# Patient Record
Sex: FEMALE | Race: WHITE | Employment: FULL TIME | URBAN - METROPOLITAN AREA
[De-identification: names, ages, dates, MRNs, and addresses within clinical notes are randomized per-mention and may not be internally consistent; named-entity substitution may affect disease eponyms.]

---

## 2017-03-15 ENCOUNTER — HOSPITAL ENCOUNTER (OUTPATIENT)
Dept: ULTRASOUND IMAGING | Age: 52
Discharge: HOME | End: 2017-03-15
Attending: OBSTETRICS & GYNECOLOGY | Admitting: OBSTETRICS & GYNECOLOGY

## 2017-03-15 DIAGNOSIS — R92.8 OTHER (ABNORMAL) FINDINGS ON RADIOLOGICAL EXAMINATION OF BREAST: ICD-10-CM

## 2018-09-27 ENCOUNTER — HOSPITAL ENCOUNTER (OUTPATIENT)
Dept: WOMENS IMAGING | Age: 53
Discharge: HOME | End: 2018-09-27
Payer: COMMERCIAL

## 2018-09-27 DIAGNOSIS — Z12.31 ENCOUNTER FOR SCREENING MAMMOGRAM FOR BREAST CANCER: ICD-10-CM

## 2018-09-27 PROCEDURE — 77063 BREAST TOMOSYNTHESIS BI: CPT

## 2019-10-23 ENCOUNTER — HOSPITAL ENCOUNTER (OUTPATIENT)
Dept: WOMENS IMAGING | Age: 54
Discharge: HOME | End: 2019-10-23
Payer: COMMERCIAL

## 2019-10-23 DIAGNOSIS — Z12.31 BREAST CANCER SCREENING BY MAMMOGRAM: ICD-10-CM

## 2019-10-23 PROCEDURE — 77063 BREAST TOMOSYNTHESIS BI: CPT

## 2020-12-18 ENCOUNTER — HOSPITAL ENCOUNTER (OUTPATIENT)
Dept: MAMMOGRAPHY | Age: 55
Discharge: HOME | End: 2020-12-18
Payer: COMMERCIAL

## 2020-12-18 PROCEDURE — 77067 SCR MAMMO BI INCL CAD: CPT

## 2021-03-30 LAB
ALBUMIN SERPL-MCNC: 4.3 G/DL (ref 3.4–5)
ALP BLD-CCNC: 59 U/L (ref 40–129)
ALT SERPL-CCNC: 21 U/L (ref 10–40)
AST SERPL-CCNC: 24 U/L (ref 15–37)
BILIRUB SERPL-MCNC: 0.6 MG/DL (ref 0–1)
BILIRUBIN DIRECT: <0.2 MG/DL (ref 0–0.3)
BILIRUBIN, INDIRECT: NORMAL MG/DL (ref 0–1)
TOTAL PROTEIN: 6.7 G/DL (ref 6.4–8.2)

## 2021-04-19 ENCOUNTER — OFFICE VISIT (OUTPATIENT)
Dept: ORTHOPEDIC SURGERY | Age: 56
End: 2021-04-19
Payer: COMMERCIAL

## 2021-04-19 VITALS — HEIGHT: 66 IN | BODY MASS INDEX: 25.71 KG/M2 | WEIGHT: 160 LBS

## 2021-04-19 DIAGNOSIS — M16.11 PRIMARY OSTEOARTHRITIS OF RIGHT HIP: ICD-10-CM

## 2021-04-19 DIAGNOSIS — M25.551 RIGHT HIP PAIN: Primary | ICD-10-CM

## 2021-04-19 PROCEDURE — 20611 DRAIN/INJ JOINT/BURSA W/US: CPT | Performed by: ORTHOPAEDIC SURGERY

## 2021-04-19 PROCEDURE — 99203 OFFICE O/P NEW LOW 30 MIN: CPT | Performed by: ORTHOPAEDIC SURGERY

## 2021-04-19 RX ORDER — LIDOCAINE HYDROCHLORIDE 10 MG/ML
1 INJECTION, SOLUTION INFILTRATION; PERINEURAL ONCE
Status: COMPLETED | OUTPATIENT
Start: 2021-04-19 | End: 2021-04-19

## 2021-04-19 RX ORDER — BETAMETHASONE SODIUM PHOSPHATE AND BETAMETHASONE ACETATE 3; 3 MG/ML; MG/ML
1 INJECTION, SUSPENSION INTRA-ARTICULAR; INTRALESIONAL; INTRAMUSCULAR; SOFT TISSUE ONCE
Status: COMPLETED | OUTPATIENT
Start: 2021-04-19 | End: 2021-04-19

## 2021-04-19 RX ADMIN — BETAMETHASONE SODIUM PHOSPHATE AND BETAMETHASONE ACETATE 1.02 MG: 3; 3 INJECTION, SUSPENSION INTRA-ARTICULAR; INTRALESIONAL; INTRAMUSCULAR; SOFT TISSUE at 17:10

## 2021-04-19 RX ADMIN — LIDOCAINE HYDROCHLORIDE 1 ML: 10 INJECTION, SOLUTION INFILTRATION; PERINEURAL at 17:11

## 2021-04-19 SDOH — ECONOMIC STABILITY: TRANSPORTATION INSECURITY
IN THE PAST 12 MONTHS, HAS LACK OF TRANSPORTATION KEPT YOU FROM MEETINGS, WORK, OR FROM GETTING THINGS NEEDED FOR DAILY LIVING?: NOT ASKED

## 2021-04-25 NOTE — PROGRESS NOTES
joint space narrowing compared to her left. There is also some changes at the superior lateral aspect of the acetabulum likely matching that impingement. No lytic or blastic lesions. Impression:  Encounter Diagnoses   Name Primary?  Right hip pain Yes    Primary osteoarthritis of right hip        Office Procedures:  Orders Placed This Encounter   Procedures    XR HIP 2-3 VW W PELVIS RIGHT     Standing Status:   Future     Number of Occurrences:   1     Standing Expiration Date:   5/17/2021    US GUIDED NEEDLE PLACEMENT     Standing Status:   Future     Number of Occurrences:   1     Standing Expiration Date:   4/19/2022     Order Specific Question:   Reason for exam:     Answer:   right hip injection       Treatment Plan: This point she is having persistent symptoms that are interfering with her daily activities. Her x-rays do show features of impingement and she may possibly have underlying labral pathology. We discussed treatment options and certainly one way to hopefully reduce her pain and improve her overall function will be to offer her an ultrasound-guided cortisone injection. She understands this will not cure any mechanical symptoms. If she does have significant improvement she may need to consider MRI assessment and possible arthroscopic intervention but that would also require considerable time off work. At this point she would like to go ahead and proceed with the injection first just to see how her symptoms improve. 1.  Injection with cortisone was recommended into  right   hip joint using ultrasound visualization. She understands the risks and benefits of the injection and describes no potential allergies. Time out was performed to verify correct person, correct procedure, and correct site. 2. Ultrasound visualization was first performed utilizing unit using an 5/2 MHz Curved Probe. finding the femoral neck head junction.  Next the femoral artery and vein were visualized with

## 2021-06-07 ENCOUNTER — TELEPHONE (OUTPATIENT)
Dept: ORTHOPEDIC SURGERY | Age: 56
End: 2021-06-07

## 2021-06-07 DIAGNOSIS — M16.11 PRIMARY OSTEOARTHRITIS OF RIGHT HIP: Primary | ICD-10-CM

## 2021-06-07 NOTE — TELEPHONE ENCOUNTER
I spoke to pt. MRI has been ordered for prior-auth. She has selected to go to [facility]. I let pt know we will call her once we get approval from her insurance company.

## 2021-06-10 ENCOUNTER — TELEPHONE (OUTPATIENT)
Dept: ORTHOPEDIC SURGERY | Age: 56
End: 2021-06-10

## 2021-06-10 NOTE — TELEPHONE ENCOUNTER
Curly Robb MA  [facility] [id number] [facility]  MRI RIGHT HIP approved Auth# [id number] - JLB     Faxed over information to [email or web address]. Called and LMOM for pt to call back, as  did not identify pt. If pt calls back, please inform her that she should be able to call [facility] [place] to schedule her MRI. After scheduling the MRI, she will need to schedule a f/u visit with OG.

## 2021-06-11 NOTE — TELEPHONE ENCOUNTER
Called and [facility] for pt to call back. If pt returns call, please inform her that she can call [facility] to schedule her MRI.

## 2021-06-14 NOTE — TELEPHONE ENCOUNTER
Called and LMOM on home number to inform pt of MRI auth and to schedule a f/u visit to review the results, as it had a pt identifier.

## 2021-07-14 ENCOUNTER — OFFICE VISIT (OUTPATIENT)
Dept: ORTHOPEDIC SURGERY | Age: 56
End: 2021-07-14
Payer: COMMERCIAL

## 2021-07-14 VITALS — BODY MASS INDEX: 26.52 KG/M2 | WEIGHT: 165 LBS | HEIGHT: 66 IN

## 2021-07-14 DIAGNOSIS — Z01.818 PRE-OP TESTING: ICD-10-CM

## 2021-07-14 DIAGNOSIS — M16.11 PRIMARY OSTEOARTHRITIS OF RIGHT HIP: Primary | ICD-10-CM

## 2021-07-14 PROCEDURE — MISCCOLD COLD THERAPY UNIT AND PAD: Performed by: ORTHOPAEDIC SURGERY

## 2021-07-14 PROCEDURE — 99213 OFFICE O/P EST LOW 20 MIN: CPT | Performed by: ORTHOPAEDIC SURGERY

## 2021-07-14 RX ORDER — CHOLECALCIFEROL (VITAMIN D3) 125 MCG
1 CAPSULE ORAL DAILY
Qty: 30 TABLET | Refills: 5 | Status: SHIPPED | OUTPATIENT
Start: 2021-07-14 | End: 2021-08-13

## 2021-07-14 NOTE — PROGRESS NOTES
Naman Mcarthur MD  [address], [address] [place] [address] Drive  [address]  [address] [facility]  July Doyle [address]    Marybeth Bradley  [id number]  Encounter Date: 7/14/2021  YOB: 1965    Chief Complaint   Patient presents with    Follow-up     Right hip. Rec'd [place]. guided cortisone injection; 4/19/21. Injection did not help. History:Ms. Marybeth Bradley is here in follow up regarding her right hip pain. Since her last visit, she has proceeded with a MRI of the right hip on 6/17/21 and is here today to review the results. She states the US injection she had on 4/9/21 did not help much with thepain. She is no longer able to do yoga or ride a bike without pain or worrying about the hip giving out. At the pain is starting to affect her activities on a daily basis and interfere with her work. Exam:  Ht 5' 6\" (1.676 m)   Wt 165 lb (74.8 kg)   BMI 26.63 kg/m²   General: Alert and oriented x3, No acute distress, Cooperative and conversant. Obesity Absent   Mood and affect are appropriate. Right hip :    Inspection:  Normal muscle contours and no significant limb length discrepancy. No gross atrophy in any particular myotome. Palpation: No tenderness to the greater trochanter/trochanteric bursa. No tenderness to the sacroiliac joint. No tenderness to the knee joint. Range of Motion:    Hip flexion 100°. Hip abduction 40°.  Hip internal rotation at 90° of flexion is 0°. Hip external rotation at 90° of flexion is 45°.  Knee range of motion shows functional range without pain. Ankle dorsiflexion and plantarflexion show functional range of motion. Strength:    Hip flexion 5-/5    Hip abduction 4+/ 5    Hip adduction 5-/5.  Knee flexion and extension 4±5/5 without pain. Dorsiflexion and plantarflexion in ankle are 4±5/5. Special Tests:    Pain with flexion and internal rotation.     Additional comments: Sensation to light touch grossly present and capillary refill less than 2 seconds. Radiology:     MRI obtained 6/17/21 have been reviewed in office:  Right hip:    Impression: No evidence for acute fracture, subluxation, or dislocation. No lytic or blastic lesions in the metaphyseal regions.          CONCLUSION:   1. Labral fraying with complex superior labral tear extending anteriorly measuring 2.5 cm.  No    paralabral cyst formation evident. 2. Moderate right hip arthrosis with acetabular spurring and overgrowth.  Capsulitis and    effusion without loose body.           Assessment:   Diagnosis Orders   1. Primary osteoarthritis of right hip         Orders  No orders of the defined types were placed in this encounter. Plan:  We discussed treatment options. Based on her MRI findings, the lack of positive injection response, and relief from physical therapy, she and I do believe she would benefit the best from a total hip replacement. She is a [employer] and does understand the process and risks involved with surgery and she has stated to me she is ready for this procedure as it affects her daily activities and work duties, such as clinics and surgeries. She did ask about her chances of developing this in the left side as well and those odds are around 80%, but fixing the right hip will help with her current gait issues. We also discussed the likelihood of a revision being needed down the road due to wear and tear of the prosthesis, due to natural aging and longevity. We discussed the option of proceeding onto elective right total hip arthroplasty. I reviewed with her the nature the procedure as well as the risks and advantages of joint replacement. We reviewed the risks of surgery and she understands that they include but are not limited to:  Infection, risk to neurovascular structures, stiffness and pain, potential for further surgery, anesthetic misadventure, component failure or dislocation, fracture of the bone, medical complications such as heart attacks, strokes, blood clots and pneumonia all of which could threaten her life or limb. We reviewed discharge destination as outlined below. We also reviewed the demand matching grid and will use advanced demand Ariel bearing surface system. She will undergo her preadmission testing as well as preoperative physical therapy assessment. She will also attend the preoperative joint education class. Unless there are items that we need to address through testing that would delay her surgery, at this point I will see her at the time of surgery and she will follow back. Should additional questions arise prior to surgery, she was asked to call the office for any clarifications that are necessary. RAPT  RISK ASSESSMENT and PREDICTION TOOL    Name: Gaviota Lugo  YOB: 1965  Surgeon: Dr Shivam Blandon         Value Score    1). What is your age group? 50 - 65 years  = 2      66 - 75 years = 1     > 75 years = 0       Your score = 2   2). Gender? Male = 2     Female = 1       Your score = 1   3). How far on average can you walk? Two blocks or more (+/- rest) = 2    (a block is 200 vbhuog=976 ft)  1 - 2 blocks (+/- rest) = 1     Housebound (most of time) = 0       Your score = 2   4). Which gait aid do you use? None = 2    (more often than not) Single-point stick = 1     Crutches/walker = 0       Your score = 2   5). Do you use community supports? None or one per week = 1    (home help, meals on wheels, district nursing) Two or more per week = 0       Your score = 1   6). Will you live with someone who can care for you after your operation? yes = 3     no = 0       Your score = 3    Your Total Score (out of 12) = 11       Key: Destination at discharge from acute care predicted by score.   Score < 6  = extended inpatient rehabilitation  Score 6 - 9  = additional intervention to discharge directly home (Rehab in the home)  Score > 9  = directly home      Patient's Preference Prediction Score Agreed destination   home 11 home   Mabel Copier  Date: 7/14/21             She understands and accepts this course of care. By signing my name below, Liam Burks, attest that this documentation has been prepared under the direction and in the presence of Lalo Trujillo MD.   Electronically Signed: Debra Miller, 7/14/21, 1:23 PM EDT. Femi Marcum MD, personally performed the services described in this documentation. All medical record entries made by the scribe were at my direction and in my presence. I have reviewed the chart and discharge instructions (if applicable) and agree that the record reflects my personal performance and is accurate and complete. Lalo Trujillo MD      Documentation was done using voice recognition dragon software. Every effort was made to ensure accuracy; however, inadvertent  Unintentional computerized transcription errors may be present.

## 2021-07-14 NOTE — LETTER
[facility]  Surgery Scheduling Form:  [facility]    DEMOGRAPHICS:                                                                                                              .    Patient Name:  Freeda Favre  Patient :  1965   Patient SS#:      Patient Phone:  [phone] (home) [phone] (work) Alt. Patient Phone:    Patient Address:  [address] [address]    PCP:  Amira Gomez MD    Payor/Plan Subscr  Sex Relation Sub. Ins. ID Effective Group Num   1. Barbi [id number] A 1965 Female Self [id number] 10/23/19 [id number]                                   [address] [address]     DIAGNOSIS & PROCEDURE:                                                                                            .    Diagnosis:   Right hip osteoarthritis M16.11  Operation:   Right total hip arthroplasty anterior approach 32503  Location: [address] [place] [address], SCHEDULE ONLY IN ROOM 4  IF RIGHT HIP, SCHEDULE ONLY IN ROOM 3  Provider:  JAIME Rasmussen INFORMATION:                                                                                         .    Requested Date:  21    OR Time:  7:00                      Patient Arrival Time:  5:30  OR Time Required: 80 Minutes  Anesthesia:  General  Equipment:  Ariel ADVANCED  Mini C-Arm:  No   Standard C-Arm:  Yes  Status:  Outpatient  PAT Required:  Yes  Comments:Allergies: Benzoyl peroxide and Amoxicillin   Body mass index is 26.63 kg/m².        21   BILLING INFORMATION:                                                                                                    .    Procedure:       CPT Code Modifier  Right total hip arthroplasty anterior approach          ORTHOPAEDIC SURGERY PRE-SURGICAL PHYSICIAN ORDERS    Freeda Favre  1965  Date of Surgery: 21 Right total hip arthroplasty anterior approach    Benzoyl peroxide and Amoxicillin  History & Physical:  [ (176-264lbs) OR 2gm  >120kg (264lbs). Reduce dose of Vancomycin to 500 mg IVPB if PT < 55 kg or serum creatinine > 2 mg/dl (Vancomycin must be administered over 1 hour).   4. Other medications: _________________________________________    Additional Orders:  Fidel.Fleischer ] Knee high anti-embolism stockings and antithrombic compression pumps (apply in Pre-op)        Physican Signature:                                                           Date: 7/14/2021 Time: 4:46 PM

## 2021-07-26 ENCOUNTER — HOSPITAL ENCOUNTER (OUTPATIENT)
Age: 56
Discharge: HOME | End: 2021-07-26
Payer: COMMERCIAL

## 2021-07-26 DIAGNOSIS — Z01.818 PRE-OP TESTING: ICD-10-CM

## 2021-07-26 DIAGNOSIS — M16.11 PRIMARY OSTEOARTHRITIS OF RIGHT HIP: ICD-10-CM

## 2021-07-26 LAB
A/G RATIO: 2 (ref 1.1–2.2)
ABO/RH: NORMAL
ALBUMIN SERPL-MCNC: 4.7 G/DL (ref 3.4–5)
ALP BLD-CCNC: 79 U/L (ref 40–129)
ALT SERPL-CCNC: 22 U/L (ref 10–40)
ANION GAP SERPL CALCULATED.3IONS-SCNC: 16 MMOL/L (ref 3–16)
ANTIBODY SCREEN: NORMAL
APTT: 31 SEC (ref 26.2–38.6)
AST SERPL-CCNC: 23 U/L (ref 15–37)
BASOPHILS ABSOLUTE: 0 K/UL (ref 0–0.2)
BASOPHILS RELATIVE PERCENT: 0.5 %
BILIRUB SERPL-MCNC: 0.3 MG/DL (ref 0–1)
BILIRUBIN URINE: NEGATIVE
BLOOD, URINE: ABNORMAL
BUN BLDV-MCNC: 17 MG/DL (ref 7–20)
CALCIUM SERPL-MCNC: 10 MG/DL (ref 8.3–10.6)
CHLORIDE BLD-SCNC: 102 MMOL/L (ref 99–110)
CLARITY: CLEAR
CO2: 25 MMOL/L (ref 21–32)
COLOR: YELLOW
CREAT SERPL-MCNC: 0.8 MG/DL (ref 0.6–1.1)
EKG ATRIAL RATE: 69 BPM
EKG DIAGNOSIS: NORMAL
EKG P AXIS: 55 DEGREES
EKG P-R INTERVAL: 162 MS
EKG Q-T INTERVAL: 386 MS
EKG QRS DURATION: 78 MS
EKG QTC CALCULATION (BAZETT): 413 MS
EKG R AXIS: -27 DEGREES
EKG T AXIS: 54 DEGREES
EKG VENTRICULAR RATE: 69 BPM
EOSINOPHILS ABSOLUTE: 0.1 K/UL (ref 0–0.6)
EOSINOPHILS RELATIVE PERCENT: 1.1 %
EPITHELIAL CELLS, UA: 2 /HPF (ref 0–5)
GFR AFRICAN AMERICAN: >60
GFR NON-AFRICAN AMERICAN: >60
GLOBULIN: 2.4 G/DL
GLUCOSE BLD-MCNC: 138 MG/DL (ref 70–99)
GLUCOSE URINE: NEGATIVE MG/DL
HCT VFR BLD CALC: 40.7 % (ref 36–48)
HEMOGLOBIN: 14.4 G/DL (ref 12–16)
HYALINE CASTS: 2 /LPF (ref 0–8)
INR BLD: 1.01 (ref 0.88–1.12)
KETONES, URINE: NEGATIVE MG/DL
LEUKOCYTE ESTERASE, URINE: NEGATIVE
LYMPHOCYTES ABSOLUTE: 1.5 K/UL (ref 1–5.1)
LYMPHOCYTES RELATIVE PERCENT: 28.2 %
MCH RBC QN AUTO: 33.6 PG (ref 26–34)
MCHC RBC AUTO-ENTMCNC: 35.5 G/DL (ref 31–36)
MCV RBC AUTO: 94.6 FL (ref 80–100)
MICROSCOPIC EXAMINATION: YES
MONOCYTES ABSOLUTE: 0.3 K/UL (ref 0–1.3)
MONOCYTES RELATIVE PERCENT: 5.1 %
NEUTROPHILS ABSOLUTE: 3.5 K/UL (ref 1.7–7.7)
NEUTROPHILS RELATIVE PERCENT: 65.1 %
NITRITE, URINE: NEGATIVE
PDW BLD-RTO: 12.1 % (ref 12.4–15.4)
PH UA: 6 (ref 5–8)
PLATELET # BLD: 187 K/UL (ref 135–450)
PMV BLD AUTO: 9.9 FL (ref 5–10.5)
POTASSIUM SERPL-SCNC: 4.6 MMOL/L (ref 3.5–5.1)
PROTEIN UA: NEGATIVE MG/DL
PROTHROMBIN TIME: 11.4 SEC (ref 9.9–12.7)
RBC # BLD: 4.31 M/UL (ref 4–5.2)
RBC UA: 2 /HPF (ref 0–4)
SODIUM BLD-SCNC: 143 MMOL/L (ref 136–145)
SPECIFIC GRAVITY UA: 1.02 (ref 1–1.03)
TOTAL PROTEIN: 7.1 G/DL (ref 6.4–8.2)
URINE REFLEX TO CULTURE: ABNORMAL
URINE TYPE: ABNORMAL
UROBILINOGEN, URINE: 0.2 E.U./DL
VITAMIN D 25-HYDROXY: 50.9 NG/ML
WBC # BLD: 5.4 K/UL (ref 4–11)
WBC UA: 1 /HPF (ref 0–5)

## 2021-07-26 PROCEDURE — 82306 VITAMIN D 25 HYDROXY: CPT

## 2021-07-26 PROCEDURE — 36415 COLL VENOUS BLD VENIPUNCTURE: CPT

## 2021-07-26 PROCEDURE — 86900 BLOOD TYPING SEROLOGIC ABO: CPT

## 2021-07-26 PROCEDURE — 85610 PROTHROMBIN TIME: CPT

## 2021-07-26 PROCEDURE — 86850 RBC ANTIBODY SCREEN: CPT

## 2021-07-26 PROCEDURE — 85730 THROMBOPLASTIN TIME PARTIAL: CPT

## 2021-07-26 PROCEDURE — 93010 ELECTROCARDIOGRAM REPORT: CPT | Performed by: INTERNAL MEDICINE

## 2021-07-26 PROCEDURE — 85025 COMPLETE CBC W/AUTO DIFF WBC: CPT

## 2021-07-26 PROCEDURE — 87081 CULTURE SCREEN ONLY: CPT

## 2021-07-26 PROCEDURE — 83036 HEMOGLOBIN GLYCOSYLATED A1C: CPT

## 2021-07-26 PROCEDURE — 81001 URINALYSIS AUTO W/SCOPE: CPT

## 2021-07-26 PROCEDURE — 86901 BLOOD TYPING SEROLOGIC RH(D): CPT

## 2021-07-26 PROCEDURE — 80053 COMPREHEN METABOLIC PANEL: CPT

## 2021-07-26 PROCEDURE — 93005 ELECTROCARDIOGRAM TRACING: CPT

## 2021-07-26 NOTE — PROGRESS NOTES
The patient will attend class on___virtual  ST notified____________  The patent will get ordered PATs on__by 8/18_________________________________  The patient will see PCP within 30 days of scheduled surgery____end of august__________  COVID__vaccinated_______

## 2021-07-26 NOTE — PROGRESS NOTES
24 hrs. Your surgery will be cancelled if you do not have a ride home. 8. A parent/legal guardian must accompany a child scheduled for surgery and plan to stay at the hospital until the child is discharged. Please do not bring other children with you. 9. Please wear simple, loose fitting clothing to the hospital.  Ester Chaudhary not bring valuables (money, credit cards, checkbooks, etc.) Do not wear any makeup (including no eye makeup) or nail polish on your fingers or toes. 10. DO NOT wear any jewelry or piercings on day of surgery. All body piercing jewelry must be removed. 11. If you have ___dentures, they will be removed before going to the OR; we will provide you a container. If you wear ___contact lenses or ___glasses, they will be removed; please bring a case for them. 12. Please see your family doctor/pediatrician for a history & physical and/or concerning medications. Bring any test results/reports from your physician's office. PCP__________________Phone___________H&P Appt. Date________             13 If you  have a Living Will and Durable Power of  for Healthcare, please bring in a copy. 15. Notify your Surgeon if you develop any illness between now and surgery  time, cough, cold, fever, sore throat, nausea, vomiting, etc.  Please notify your surgeon if you experience dizziness, shortness of breath or blurred vision between now & the time of your surgery             15. DO NOT shave your operative site 96 hours prior to surgery. For face & neck surgery, men may use an electric razor 48 hours prior to surgery. 16. Shower the night before or morning of surgery using an antibacterial soap or as you have been instructed. 17. To provide excellent care visitors will be limited to one in the room at any given time. 18.  Please bring picture ID and insurance card.              19.  Visit our web site for additional information:  [email or web address]              20.During flu season no children under the age of 15 are permitted in the hospital for the safety of all patients. 21. If you take a long acting insulin in the evening only  take half of your usual  dose the night  before your procedure              22. If you use a c-pap please bring DOS if staying overnight,             23.For your convenience [facility] has a pharmacy on site to fill your prescriptions. 24. If you use oxygen and have a portable tank please bring it  with you the DOS             25. Bring a complete list of all your medications with name and dose include any supplements. 26. Other__________________________________________   *Please call pre admission testing if you any further questions   [address] [place] [address]. Mireya Varghese  [phone]   [facility]    __ Done-Where _____  __ Scheduled ___ Where ___   __ Other __________  _x_ VACCINATED-instructed to bring card DOS      VISITOR POLICY(subject to change)    There is a one visitor policy at [facility] for all surgeries and endoscopies. Whether the visitor can stay or will be asked to wait in the car will depend on the current policy and if social distancing can be maintained. The policy is subject to change at any time. Please make sure the visitor has a cell phone that is on,charged and able to accept calls, as this may be the way that the staff communicates with them. Pain management is NO VISITOR policyThe patients ride is expected to remain in the car with a cell phone for communication. If the ride is leaving the hospital grounds please make sure they are back in time for pickup. Have the patient inform the staff on arrival what their rides plans are while the patient is in the facility. At the MAIN there is one visitor allowed. Please note that the visitor policy is subject to change. All above information reviewed with patient in person or by phone. Patient verbalizes understanding. All questions and concerns addressed.                                                                                                  Patient/Rep____per phone/pt________________                                                                                                                                    PRE OP INSTRUCTIONS

## 2021-07-27 LAB
ESTIMATED AVERAGE GLUCOSE: 96.8 MG/DL
HBA1C MFR BLD: 5 %

## 2021-07-28 LAB — MRSA CULTURE ONLY: NORMAL

## 2021-07-29 ENCOUNTER — TELEPHONE (OUTPATIENT)
Dept: INPATIENT UNIT | Age: 56
End: 2021-07-29

## 2021-07-29 NOTE — TELEPHONE ENCOUNTER
Patient sent link to watch Joint Education Class  Emailed joint education materials to patient about medication education, points to remember, pre-surgical checklist, and pre-op showering instruction. Received an e-mail confirmation of patient viewing video. Pre-test and post-test done. Patient is aware that may contact me for any questions. My work email address and phone number given. Patient registered for [facility]    Pre-test:5/5  Post-test:5/5    DOS: 9/7/2021  Dr Spenser Doyle Nurse Navigator  [phone]  [email or web address]

## 2021-08-16 ENCOUNTER — TELEPHONE (OUTPATIENT)
Dept: ORTHOPEDIC SURGERY | Age: 56
End: 2021-08-16

## 2021-09-07 ENCOUNTER — ANESTHESIA EVENT (OUTPATIENT)
Dept: OPERATING ROOM | Age: 56
End: 2021-09-07
Payer: COMMERCIAL

## 2021-09-07 ENCOUNTER — APPOINTMENT (OUTPATIENT)
Dept: GENERAL RADIOLOGY | Age: 56
End: 2021-09-07
Attending: ORTHOPAEDIC SURGERY
Payer: COMMERCIAL

## 2021-09-07 ENCOUNTER — ANESTHESIA (OUTPATIENT)
Dept: OPERATING ROOM | Age: 56
End: 2021-09-07
Payer: COMMERCIAL

## 2021-09-07 ENCOUNTER — TELEPHONE (OUTPATIENT)
Dept: ORTHOPEDIC SURGERY | Age: 56
End: 2021-09-07

## 2021-09-07 ENCOUNTER — HOSPITAL ENCOUNTER (OUTPATIENT)
Age: 56
Setting detail: OUTPATIENT SURGERY
Discharge: HOME | End: 2021-09-07
Attending: ORTHOPAEDIC SURGERY | Admitting: ORTHOPAEDIC SURGERY
Payer: COMMERCIAL

## 2021-09-07 VITALS
WEIGHT: 166.5 LBS | OXYGEN SATURATION: 96 % | RESPIRATION RATE: 16 BRPM | TEMPERATURE: 97.5 F | BODY MASS INDEX: 26.76 KG/M2 | HEIGHT: 66 IN | SYSTOLIC BLOOD PRESSURE: 112 MMHG | DIASTOLIC BLOOD PRESSURE: 73 MMHG | HEART RATE: 70 BPM

## 2021-09-07 VITALS
SYSTOLIC BLOOD PRESSURE: 112 MMHG | RESPIRATION RATE: 21 BRPM | OXYGEN SATURATION: 100 % | DIASTOLIC BLOOD PRESSURE: 66 MMHG | TEMPERATURE: 96.1 F

## 2021-09-07 DIAGNOSIS — M16.11 PRIMARY OSTEOARTHRITIS OF RIGHT HIP: ICD-10-CM

## 2021-09-07 DIAGNOSIS — Z01.818 PREOP TESTING: Primary | ICD-10-CM

## 2021-09-07 LAB
ABO/RH: NORMAL
ANTIBODY SCREEN: NORMAL
GLUCOSE BLD-MCNC: 139 MG/DL (ref 70–99)
GLUCOSE BLD-MCNC: 97 MG/DL (ref 70–99)
HCG(URINE) PREGNANCY TEST: NEGATIVE
PERFORMED ON: ABNORMAL
PERFORMED ON: NORMAL

## 2021-09-07 PROCEDURE — 2720000010 HC SURG SUPPLY STERILE: Performed by: ORTHOPAEDIC SURGERY

## 2021-09-07 PROCEDURE — 6370000000 HC RX 637 (ALT 250 FOR IP)

## 2021-09-07 PROCEDURE — 97535 SELF CARE MNGMENT TRAINING: CPT

## 2021-09-07 PROCEDURE — 6360000002 HC RX W HCPCS

## 2021-09-07 PROCEDURE — 86850 RBC ANTIBODY SCREEN: CPT

## 2021-09-07 PROCEDURE — 6360000002 HC RX W HCPCS: Performed by: ORTHOPAEDIC SURGERY

## 2021-09-07 PROCEDURE — 6360000002 HC RX W HCPCS: Performed by: ANESTHESIOLOGY

## 2021-09-07 PROCEDURE — 86901 BLOOD TYPING SEROLOGIC RH(D): CPT

## 2021-09-07 PROCEDURE — 73501 X-RAY EXAM HIP UNI 1 VIEW: CPT

## 2021-09-07 PROCEDURE — 7100000000 HC PACU RECOVERY - FIRST 15 MIN: Performed by: ORTHOPAEDIC SURGERY

## 2021-09-07 PROCEDURE — 2500000003 HC RX 250 WO HCPCS: Performed by: NURSE ANESTHETIST, CERTIFIED REGISTERED

## 2021-09-07 PROCEDURE — 3600000004 HC SURGERY LEVEL 4 BASE: Performed by: ORTHOPAEDIC SURGERY

## 2021-09-07 PROCEDURE — 2500000003 HC RX 250 WO HCPCS: Performed by: ORTHOPAEDIC SURGERY

## 2021-09-07 PROCEDURE — 2580000003 HC RX 258: Performed by: ORTHOPAEDIC SURGERY

## 2021-09-07 PROCEDURE — 97161 PT EVAL LOW COMPLEX 20 MIN: CPT

## 2021-09-07 PROCEDURE — 97165 OT EVAL LOW COMPLEX 30 MIN: CPT

## 2021-09-07 PROCEDURE — 2500000003 HC RX 250 WO HCPCS

## 2021-09-07 PROCEDURE — 27130 TOTAL HIP ARTHROPLASTY: CPT | Performed by: PHYSICIAN ASSISTANT

## 2021-09-07 PROCEDURE — 7100000011 HC PHASE II RECOVERY - ADDTL 15 MIN: Performed by: ORTHOPAEDIC SURGERY

## 2021-09-07 PROCEDURE — 99024 POSTOP FOLLOW-UP VISIT: CPT | Performed by: NURSE PRACTITIONER

## 2021-09-07 PROCEDURE — 7100000010 HC PHASE II RECOVERY - FIRST 15 MIN: Performed by: ORTHOPAEDIC SURGERY

## 2021-09-07 PROCEDURE — C1776 JOINT DEVICE (IMPLANTABLE): HCPCS | Performed by: ORTHOPAEDIC SURGERY

## 2021-09-07 PROCEDURE — 6370000000 HC RX 637 (ALT 250 FOR IP): Performed by: ANESTHESIOLOGY

## 2021-09-07 PROCEDURE — 3600000014 HC SURGERY LEVEL 4 ADDTL 15MIN: Performed by: ORTHOPAEDIC SURGERY

## 2021-09-07 PROCEDURE — 3700000000 HC ANESTHESIA ATTENDED CARE: Performed by: ORTHOPAEDIC SURGERY

## 2021-09-07 PROCEDURE — 86900 BLOOD TYPING SEROLOGIC ABO: CPT

## 2021-09-07 PROCEDURE — 84703 CHORIONIC GONADOTROPIN ASSAY: CPT

## 2021-09-07 PROCEDURE — 36415 COLL VENOUS BLD VENIPUNCTURE: CPT

## 2021-09-07 PROCEDURE — 27130 TOTAL HIP ARTHROPLASTY: CPT | Performed by: ORTHOPAEDIC SURGERY

## 2021-09-07 PROCEDURE — 7100000001 HC PACU RECOVERY - ADDTL 15 MIN: Performed by: ORTHOPAEDIC SURGERY

## 2021-09-07 PROCEDURE — 2709999900 HC NON-CHARGEABLE SUPPLY: Performed by: ORTHOPAEDIC SURGERY

## 2021-09-07 PROCEDURE — APPNB45 APP NON BILLABLE 31-45 MINUTES: Performed by: NURSE PRACTITIONER

## 2021-09-07 PROCEDURE — 3209999900 FLUORO FOR SURGICAL PROCEDURES

## 2021-09-07 PROCEDURE — 6360000002 HC RX W HCPCS: Performed by: NURSE ANESTHETIST, CERTIFIED REGISTERED

## 2021-09-07 PROCEDURE — 3700000001 HC ADD 15 MINUTES (ANESTHESIA): Performed by: ORTHOPAEDIC SURGERY

## 2021-09-07 DEVICE — BIOLOX® DELTA, CERAMIC FEMORAL HEAD, S, Ø 36/-3.5, TAPER 12/14
Type: IMPLANTABLE DEVICE | Site: HIP | Status: FUNCTIONAL
Brand: BIOLOX® DELTA

## 2021-09-07 DEVICE — SHELL ACET SZ E DIA52MM 3 H OSSEOTI LIMIT H 2 MOBILITY G7: Type: IMPLANTABLE DEVICE | Site: HIP | Status: FUNCTIONAL

## 2021-09-07 DEVICE — BONE SCREW 6.5X25 SELF-TAP: Type: IMPLANTABLE DEVICE | Site: HIP | Status: FUNCTIONAL

## 2021-09-07 DEVICE — G7 VIT E NEUTRAL LNR 36MM E: Type: IMPLANTABLE DEVICE | Site: HIP | Status: FUNCTIONAL

## 2021-09-07 DEVICE — IMPLANTABLE DEVICE: Type: IMPLANTABLE DEVICE | Site: HIP | Status: FUNCTIONAL

## 2021-09-07 RX ORDER — PHENYLEPHRINE HCL IN 0.9% NACL 1 MG/10 ML
SYRINGE (ML) INTRAVENOUS PRN
Status: DISCONTINUED | OUTPATIENT
Start: 2021-09-07 | End: 2021-09-07 | Stop reason: SDUPTHER

## 2021-09-07 RX ORDER — DIPHENHYDRAMINE HYDROCHLORIDE 50 MG/ML
12.5 INJECTION INTRAMUSCULAR; INTRAVENOUS
Status: DISCONTINUED | OUTPATIENT
Start: 2021-09-07 | End: 2021-09-07 | Stop reason: HOSPADM

## 2021-09-07 RX ORDER — LIDOCAINE HYDROCHLORIDE 10 MG/ML
0.5 INJECTION, SOLUTION EPIDURAL; INFILTRATION; INTRACAUDAL; PERINEURAL ONCE
Status: DISCONTINUED | OUTPATIENT
Start: 2021-09-07 | End: 2021-09-07 | Stop reason: HOSPADM

## 2021-09-07 RX ORDER — OXYCODONE HYDROCHLORIDE 5 MG/1
5 TABLET ORAL ONCE
Status: COMPLETED | OUTPATIENT
Start: 2021-09-07 | End: 2021-09-07

## 2021-09-07 RX ORDER — FENTANYL CITRATE 50 UG/ML
INJECTION, SOLUTION INTRAMUSCULAR; INTRAVENOUS PRN
Status: DISCONTINUED | OUTPATIENT
Start: 2021-09-07 | End: 2021-09-07 | Stop reason: SDUPTHER

## 2021-09-07 RX ORDER — CELECOXIB 200 MG/1
200 CAPSULE ORAL ONCE
Status: COMPLETED | OUTPATIENT
Start: 2021-09-07 | End: 2021-09-07

## 2021-09-07 RX ORDER — VANCOMYCIN HYDROCHLORIDE 1 G/20ML
INJECTION, POWDER, LYOPHILIZED, FOR SOLUTION INTRAVENOUS
Status: COMPLETED | OUTPATIENT
Start: 2021-09-07 | End: 2021-09-07

## 2021-09-07 RX ORDER — FENTANYL CITRATE 50 UG/ML
50 INJECTION, SOLUTION INTRAMUSCULAR; INTRAVENOUS EVERY 5 MIN PRN
Status: DISCONTINUED | OUTPATIENT
Start: 2021-09-07 | End: 2021-09-07 | Stop reason: HOSPADM

## 2021-09-07 RX ORDER — BUPIVACAINE HYDROCHLORIDE AND EPINEPHRINE 2.5; 5 MG/ML; UG/ML
INJECTION, SOLUTION EPIDURAL; INFILTRATION; INTRACAUDAL; PERINEURAL
Status: COMPLETED | OUTPATIENT
Start: 2021-09-07 | End: 2021-09-07

## 2021-09-07 RX ORDER — HYDROMORPHONE HCL 110MG/55ML
PATIENT CONTROLLED ANALGESIA SYRINGE INTRAVENOUS PRN
Status: DISCONTINUED | OUTPATIENT
Start: 2021-09-07 | End: 2021-09-07 | Stop reason: SDUPTHER

## 2021-09-07 RX ORDER — GLYCOPYRROLATE 0.2 MG/ML
0.4 INJECTION INTRAMUSCULAR; INTRAVENOUS ONCE
Status: COMPLETED | OUTPATIENT
Start: 2021-09-07 | End: 2021-09-07

## 2021-09-07 RX ORDER — ONDANSETRON 2 MG/ML
INJECTION INTRAMUSCULAR; INTRAVENOUS PRN
Status: DISCONTINUED | OUTPATIENT
Start: 2021-09-07 | End: 2021-09-07 | Stop reason: SDUPTHER

## 2021-09-07 RX ORDER — SCOLOPAMINE TRANSDERMAL SYSTEM 1 MG/1
1 PATCH, EXTENDED RELEASE TRANSDERMAL
Status: DISCONTINUED | OUTPATIENT
Start: 2021-09-07 | End: 2021-09-07 | Stop reason: HOSPADM

## 2021-09-07 RX ORDER — HYDROCODONE BITARTRATE AND ACETAMINOPHEN 5; 325 MG/1; MG/1
1 TABLET ORAL PRN
Status: DISCONTINUED | OUTPATIENT
Start: 2021-09-07 | End: 2021-09-07 | Stop reason: HOSPADM

## 2021-09-07 RX ORDER — DEXAMETHASONE SODIUM PHOSPHATE 4 MG/ML
INJECTION, SOLUTION INTRA-ARTICULAR; INTRALESIONAL; INTRAMUSCULAR; INTRAVENOUS; SOFT TISSUE PRN
Status: DISCONTINUED | OUTPATIENT
Start: 2021-09-07 | End: 2021-09-07 | Stop reason: SDUPTHER

## 2021-09-07 RX ORDER — MIDAZOLAM HYDROCHLORIDE 1 MG/ML
INJECTION INTRAMUSCULAR; INTRAVENOUS PRN
Status: DISCONTINUED | OUTPATIENT
Start: 2021-09-07 | End: 2021-09-07 | Stop reason: SDUPTHER

## 2021-09-07 RX ORDER — ROCURONIUM BROMIDE 10 MG/ML
INJECTION, SOLUTION INTRAVENOUS PRN
Status: DISCONTINUED | OUTPATIENT
Start: 2021-09-07 | End: 2021-09-07 | Stop reason: SDUPTHER

## 2021-09-07 RX ORDER — SODIUM CHLORIDE, SODIUM LACTATE, POTASSIUM CHLORIDE, CALCIUM CHLORIDE 600; 310; 30; 20 MG/100ML; MG/100ML; MG/100ML; MG/100ML
INJECTION, SOLUTION INTRAVENOUS CONTINUOUS
Status: DISCONTINUED | OUTPATIENT
Start: 2021-09-07 | End: 2021-09-07 | Stop reason: HOSPADM

## 2021-09-07 RX ORDER — HYDROCODONE BITARTRATE AND ACETAMINOPHEN 5; 325 MG/1; MG/1
2 TABLET ORAL PRN
Status: DISCONTINUED | OUTPATIENT
Start: 2021-09-07 | End: 2021-09-07 | Stop reason: HOSPADM

## 2021-09-07 RX ORDER — CELECOXIB 200 MG/1
200 CAPSULE ORAL DAILY
Qty: 30 CAPSULE | Refills: 0 | Status: SHIPPED | OUTPATIENT
Start: 2021-09-07 | End: 2021-10-07

## 2021-09-07 RX ORDER — OXYCODONE HYDROCHLORIDE 5 MG/1
5-10 TABLET ORAL EVERY 4 HOURS PRN
Qty: 56 TABLET | Refills: 0 | Status: SHIPPED | OUTPATIENT
Start: 2021-09-07 | End: 2021-09-14

## 2021-09-07 RX ORDER — ASPIRIN 325 MG
325 TABLET, DELAYED RELEASE (ENTERIC COATED) ORAL DAILY
Qty: 30 TABLET | Refills: 0 | Status: SHIPPED | OUTPATIENT
Start: 2021-09-07 | End: 2021-10-07

## 2021-09-07 RX ORDER — PROMETHAZINE HYDROCHLORIDE 25 MG/ML
6.25 INJECTION, SOLUTION INTRAMUSCULAR; INTRAVENOUS EVERY 30 MIN PRN
Status: DISCONTINUED | OUTPATIENT
Start: 2021-09-07 | End: 2021-09-07 | Stop reason: HOSPADM

## 2021-09-07 RX ORDER — GLYCOPYRROLATE 0.2 MG/ML
INJECTION INTRAMUSCULAR; INTRAVENOUS
Status: COMPLETED
Start: 2021-09-07 | End: 2021-09-07

## 2021-09-07 RX ORDER — SUCCINYLCHOLINE/SOD CL,ISO/PF 200MG/10ML
SYRINGE (ML) INTRAVENOUS PRN
Status: DISCONTINUED | OUTPATIENT
Start: 2021-09-07 | End: 2021-09-07 | Stop reason: SDUPTHER

## 2021-09-07 RX ORDER — LIDOCAINE HYDROCHLORIDE 20 MG/ML
INJECTION, SOLUTION EPIDURAL; INFILTRATION; INTRACAUDAL; PERINEURAL PRN
Status: DISCONTINUED | OUTPATIENT
Start: 2021-09-07 | End: 2021-09-07 | Stop reason: SDUPTHER

## 2021-09-07 RX ORDER — FENTANYL CITRATE 50 UG/ML
25 INJECTION, SOLUTION INTRAMUSCULAR; INTRAVENOUS EVERY 5 MIN PRN
Status: DISCONTINUED | OUTPATIENT
Start: 2021-09-07 | End: 2021-09-07 | Stop reason: HOSPADM

## 2021-09-07 RX ORDER — MAGNESIUM HYDROXIDE 1200 MG/15ML
LIQUID ORAL
Status: COMPLETED | OUTPATIENT
Start: 2021-09-07 | End: 2021-09-07

## 2021-09-07 RX ORDER — OXYCODONE HYDROCHLORIDE 5 MG/1
TABLET ORAL
Status: COMPLETED
Start: 2021-09-07 | End: 2021-09-07

## 2021-09-07 RX ORDER — GLYCOPYRROLATE 1 MG/5 ML
SYRINGE (ML) INTRAVENOUS PRN
Status: DISCONTINUED | OUTPATIENT
Start: 2021-09-07 | End: 2021-09-07 | Stop reason: SDUPTHER

## 2021-09-07 RX ORDER — MEPERIDINE HYDROCHLORIDE 25 MG/ML
12.5 INJECTION INTRAMUSCULAR; INTRAVENOUS; SUBCUTANEOUS EVERY 5 MIN PRN
Status: DISCONTINUED | OUTPATIENT
Start: 2021-09-07 | End: 2021-09-07 | Stop reason: HOSPADM

## 2021-09-07 RX ORDER — HYDROMORPHONE HCL 110MG/55ML
0.25 PATIENT CONTROLLED ANALGESIA SYRINGE INTRAVENOUS EVERY 5 MIN PRN
Status: DISCONTINUED | OUTPATIENT
Start: 2021-09-07 | End: 2021-09-07 | Stop reason: HOSPADM

## 2021-09-07 RX ORDER — PROPOFOL 10 MG/ML
INJECTION, EMULSION INTRAVENOUS PRN
Status: DISCONTINUED | OUTPATIENT
Start: 2021-09-07 | End: 2021-09-07 | Stop reason: SDUPTHER

## 2021-09-07 RX ORDER — HYDROMORPHONE HCL 110MG/55ML
0.5 PATIENT CONTROLLED ANALGESIA SYRINGE INTRAVENOUS EVERY 5 MIN PRN
Status: COMPLETED | OUTPATIENT
Start: 2021-09-07 | End: 2021-09-07

## 2021-09-07 RX ADMIN — DEXAMETHASONE SODIUM PHOSPHATE 10 MG: 4 INJECTION, SOLUTION INTRAMUSCULAR; INTRAVENOUS at 07:07

## 2021-09-07 RX ADMIN — CELECOXIB 200 MG: 200 CAPSULE ORAL at 06:51

## 2021-09-07 RX ADMIN — HYDROMORPHONE HYDROCHLORIDE 0.5 MG: 2 INJECTION, SOLUTION INTRAMUSCULAR; INTRAVENOUS; SUBCUTANEOUS at 08:55

## 2021-09-07 RX ADMIN — HYDROMORPHONE HYDROCHLORIDE 0.4 MG: 2 INJECTION, SOLUTION INTRAMUSCULAR; INTRAVENOUS; SUBCUTANEOUS at 08:20

## 2021-09-07 RX ADMIN — TRANEXAMIC ACID 1000 MG: 1 INJECTION, SOLUTION INTRAVENOUS at 08:00

## 2021-09-07 RX ADMIN — TRANEXAMIC ACID 1000 MG: 1 INJECTION, SOLUTION INTRAVENOUS at 06:55

## 2021-09-07 RX ADMIN — Medication 100 MCG: at 07:56

## 2021-09-07 RX ADMIN — HYDROMORPHONE HYDROCHLORIDE 0.5 MG: 2 INJECTION, SOLUTION INTRAMUSCULAR; INTRAVENOUS; SUBCUTANEOUS at 09:00

## 2021-09-07 RX ADMIN — HYDROMORPHONE HYDROCHLORIDE 0.4 MG: 2 INJECTION, SOLUTION INTRAMUSCULAR; INTRAVENOUS; SUBCUTANEOUS at 08:02

## 2021-09-07 RX ADMIN — FENTANYL CITRATE 25 MCG: 50 INJECTION, SOLUTION INTRAMUSCULAR; INTRAVENOUS at 09:05

## 2021-09-07 RX ADMIN — ROCURONIUM BROMIDE 20 MG: 10 INJECTION, SOLUTION INTRAVENOUS at 07:58

## 2021-09-07 RX ADMIN — HYDROMORPHONE HYDROCHLORIDE 0.5 MG: 2 INJECTION, SOLUTION INTRAMUSCULAR; INTRAVENOUS; SUBCUTANEOUS at 08:50

## 2021-09-07 RX ADMIN — CEFAZOLIN 2000 MG: 10 INJECTION, POWDER, FOR SOLUTION INTRAVENOUS at 07:09

## 2021-09-07 RX ADMIN — Medication 0.2 MG: at 07:18

## 2021-09-07 RX ADMIN — CEFAZOLIN 2000 MG: 10 INJECTION, POWDER, FOR SOLUTION INTRAVENOUS at 14:18

## 2021-09-07 RX ADMIN — Medication 50 MCG: at 07:37

## 2021-09-07 RX ADMIN — Medication 100 MCG: at 08:08

## 2021-09-07 RX ADMIN — ROCURONIUM BROMIDE 10 MG: 10 INJECTION, SOLUTION INTRAVENOUS at 07:03

## 2021-09-07 RX ADMIN — Medication 100 MCG: at 07:23

## 2021-09-07 RX ADMIN — GLYCOPYRROLATE 0.4 MG: 0.2 INJECTION, SOLUTION INTRAMUSCULAR; INTRAVENOUS at 11:38

## 2021-09-07 RX ADMIN — GLYCOPYRROLATE 0.4 MG: 0.2 INJECTION INTRAMUSCULAR; INTRAVENOUS at 11:38

## 2021-09-07 RX ADMIN — SUGAMMADEX 200 MG: 100 INJECTION, SOLUTION INTRAVENOUS at 08:17

## 2021-09-07 RX ADMIN — Medication 100 MG: at 07:03

## 2021-09-07 RX ADMIN — PROPOFOL 200 MG: 10 INJECTION, EMULSION INTRAVENOUS at 07:03

## 2021-09-07 RX ADMIN — SODIUM CHLORIDE, POTASSIUM CHLORIDE, SODIUM LACTATE AND CALCIUM CHLORIDE: 600; 310; 30; 20 INJECTION, SOLUTION INTRAVENOUS at 06:59

## 2021-09-07 RX ADMIN — SODIUM CHLORIDE, POTASSIUM CHLORIDE, SODIUM LACTATE AND CALCIUM CHLORIDE: 600; 310; 30; 20 INJECTION, SOLUTION INTRAVENOUS at 07:53

## 2021-09-07 RX ADMIN — MIDAZOLAM 2 MG: 1 INJECTION INTRAMUSCULAR; INTRAVENOUS at 06:59

## 2021-09-07 RX ADMIN — OXYCODONE 5 MG: 5 TABLET ORAL at 10:11

## 2021-09-07 RX ADMIN — FENTANYL CITRATE 25 MCG: 50 INJECTION, SOLUTION INTRAMUSCULAR; INTRAVENOUS at 09:10

## 2021-09-07 RX ADMIN — HYDROMORPHONE HYDROCHLORIDE 0.2 MG: 2 INJECTION, SOLUTION INTRAMUSCULAR; INTRAVENOUS; SUBCUTANEOUS at 08:24

## 2021-09-07 RX ADMIN — LIDOCAINE HYDROCHLORIDE 100 MG: 20 INJECTION, SOLUTION EPIDURAL; INFILTRATION; INTRACAUDAL; PERINEURAL at 07:01

## 2021-09-07 RX ADMIN — FENTANYL CITRATE 100 MCG: 50 INJECTION, SOLUTION INTRAMUSCULAR; INTRAVENOUS at 07:01

## 2021-09-07 RX ADMIN — OXYCODONE HYDROCHLORIDE 5 MG: 5 TABLET ORAL at 10:11

## 2021-09-07 RX ADMIN — Medication 150 MCG: at 07:17

## 2021-09-07 RX ADMIN — ROCURONIUM BROMIDE 40 MG: 10 INJECTION, SOLUTION INTRAVENOUS at 07:14

## 2021-09-07 RX ADMIN — SODIUM CHLORIDE, POTASSIUM CHLORIDE, SODIUM LACTATE AND CALCIUM CHLORIDE: 600; 310; 30; 20 INJECTION, SOLUTION INTRAVENOUS at 06:51

## 2021-09-07 RX ADMIN — HYDROMORPHONE HYDROCHLORIDE 0.5 MG: 2 INJECTION, SOLUTION INTRAMUSCULAR; INTRAVENOUS; SUBCUTANEOUS at 08:45

## 2021-09-07 RX ADMIN — ONDANSETRON 4 MG: 2 INJECTION INTRAMUSCULAR; INTRAVENOUS at 07:07

## 2021-09-07 RX ADMIN — Medication 100 MCG: at 08:16

## 2021-09-07 ASSESSMENT — PAIN SCALES - GENERAL
PAINLEVEL_OUTOF10: 8
PAINLEVEL_OUTOF10: 7
PAINLEVEL_OUTOF10: 1
PAINLEVEL_OUTOF10: 8
PAINLEVEL_OUTOF10: 4
PAINLEVEL_OUTOF10: 6
PAINLEVEL_OUTOF10: 8
PAINLEVEL_OUTOF10: 8
PAINLEVEL_OUTOF10: 4
PAINLEVEL_OUTOF10: 6

## 2021-09-07 ASSESSMENT — PULMONARY FUNCTION TESTS
PIF_VALUE: 16
PIF_VALUE: 15
PIF_VALUE: 14
PIF_VALUE: 37
PIF_VALUE: 14
PIF_VALUE: 5
PIF_VALUE: 15
PIF_VALUE: 13
PIF_VALUE: 14
PIF_VALUE: 2
PIF_VALUE: 16
PIF_VALUE: 5
PIF_VALUE: 13
PIF_VALUE: 14
PIF_VALUE: 14
PIF_VALUE: 5
PIF_VALUE: 13
PIF_VALUE: 14
PIF_VALUE: 14
PIF_VALUE: 13
PIF_VALUE: 15
PIF_VALUE: 14
PIF_VALUE: 15
PIF_VALUE: 13
PIF_VALUE: 15
PIF_VALUE: 0
PIF_VALUE: 14
PIF_VALUE: 15
PIF_VALUE: 15
PIF_VALUE: 0
PIF_VALUE: 16
PIF_VALUE: 3
PIF_VALUE: 14
PIF_VALUE: 15
PIF_VALUE: 14
PIF_VALUE: 15
PIF_VALUE: 16
PIF_VALUE: 13
PIF_VALUE: 12
PIF_VALUE: 13
PIF_VALUE: 5
PIF_VALUE: 14
PIF_VALUE: 15
PIF_VALUE: 14
PIF_VALUE: 15
PIF_VALUE: 15
PIF_VALUE: 2
PIF_VALUE: 14
PIF_VALUE: 4
PIF_VALUE: 13
PIF_VALUE: 0
PIF_VALUE: 14
PIF_VALUE: 16
PIF_VALUE: 15
PIF_VALUE: 2
PIF_VALUE: 14
PIF_VALUE: 15
PIF_VALUE: 15
PIF_VALUE: 5
PIF_VALUE: 15
PIF_VALUE: 1
PIF_VALUE: 2
PIF_VALUE: 14
PIF_VALUE: 14
PIF_VALUE: 15
PIF_VALUE: 14
PIF_VALUE: 15
PIF_VALUE: 15
PIF_VALUE: 3
PIF_VALUE: 13
PIF_VALUE: 0
PIF_VALUE: 2
PIF_VALUE: 1
PIF_VALUE: 15
PIF_VALUE: 14
PIF_VALUE: 0
PIF_VALUE: 14
PIF_VALUE: 14

## 2021-09-07 ASSESSMENT — PAIN DESCRIPTION - PAIN TYPE
TYPE: SURGICAL PAIN

## 2021-09-07 ASSESSMENT — PAIN DESCRIPTION - LOCATION
LOCATION: HIP

## 2021-09-07 ASSESSMENT — PAIN DESCRIPTION - ORIENTATION
ORIENTATION: RIGHT

## 2021-09-07 ASSESSMENT — PAIN DESCRIPTION - DESCRIPTORS: DESCRIPTORS: ACHING;BURNING

## 2021-09-07 ASSESSMENT — PAIN - FUNCTIONAL ASSESSMENT: PAIN_FUNCTIONAL_ASSESSMENT: 0-10

## 2021-09-07 NOTE — H&P
Patient seen and examined. I have reviewed the history and physical and examined the patient and find no relevant changes. Surgery plan reviewed. /71   Pulse 69   Temp 98.5 °F (36.9 °C) (Temporal)   Resp 18   Ht 5' 6\" (1.676 m)   Wt 166 lb 8 oz (75.5 kg)   LMP 06/01/2021   SpO2 100%   BMI 26.87 kg/m²     The patient was counseled at length about the risks of maninder Covid-19 during their perioperative period and any recovery window from their procedure. The patient was made aware that maninder Covid-19  may worsen their prognosis for recovering from their procedure  and lend to a higher morbidity and/or mortality risk. All material risks, benefits, and reasonable alternatives including postponing the procedure were discussed. The patient does wish to proceed with the procedure at this time. Site marked and consent verified. All questions answered and antibiotic verified. Ready for right total hip arthroplasty    Nii Mcarthur, [address] [place] [address] and Sports Medicine  09/07/21  6:43 AM

## 2021-09-07 NOTE — PROGRESS NOTES
Pt worked with PT/OT and assisted pt to bathroom, voided clear yellow urine. Discharge instructions and new medications reviewed with pt and pts  at bedside, both verbalized understanding. Outpatient pharmacy delivered prescriptions to bedside. Pt safely dressed and transferred self to wheelchair, IV removed without complications. Pt discharged in wheelchair with all belongings to car by [place]. Pt tolerated well.

## 2021-09-07 NOTE — PROGRESS NOTES
Daniel Matthews FAXED TO [facility] @ THIS TIME. Aware of discharge with home care. Home care orders sent to [facility].

## 2021-09-07 NOTE — PROGRESS NOTES
Marisa Arvizu PA-C [facility]       Merry Herminio, APRN - CNP  9/7/2021  9:45 AM    Mora Jade was evaluated today and a DME order was entered for a wheeled walker because she requires this to successfully complete daily living tasks of toileting and ambulating. A wheeled walker is necessary due to the patient's unsteady gait, upper body weakness, and inability to  an ambulation device; and she can ambulate only by pushing a walker instead of a lesser assistive device such as a cane, crutch, or standard walker. The need for this equipment was discussed with the patient and she understands and is in agreement.

## 2021-09-07 NOTE — ANESTHESIA POSTPROCEDURE EVALUATION
Department of Anesthesiology  Postprocedure Note    Patient: Linnette Moreno  MRN: [id number]  YOB: 1965  Date of evaluation: 9/7/2021  Time:  8:38 AM     Procedure Summary     Date: 09/07/21 Room / Location: [address]    Anesthesia Start: 1069 Anesthesia Stop: 6738    Procedure: RIGHT TOTAL HIP ARTHROPLASTY ANTERIOR APPROACH - Montserrat Bailey ADVANCED (Right Hip) Diagnosis: (M16.11  RIGHT HIP OSTEOARTHRITIS)    Surgeons: Debbie Elliott MD Responsible Provider: Simran Qureshi MD    Anesthesia Type: general ASA Status: 1          Anesthesia Type: general    Parvez Phase I:      Parvez Phase II:      Last vitals: Reviewed and per EMR flowsheets.        Anesthesia Post Evaluation    Patient location during evaluation: PACU  Patient participation: complete - patient participated  Level of consciousness: awake and alert  Pain score: 2  Airway patency: patent  Nausea & Vomiting: no vomiting  Complications: no  Cardiovascular status: blood pressure returned to baseline  Respiratory status: acceptable  Hydration status: euvolemic

## 2021-09-07 NOTE — PROGRESS NOTES
Pt resting quietly in bed, awake, rates pain in right hip as a 4 out of 10. VSS, O2 sats 92% on room air. Dressing on right hip dry and intact, right pedal and posterior tibial pulses palpable. Pt seen by anesthesia, phase 1 criteria met. PT/OT paged. Will monitor.

## 2021-09-07 NOTE — OP NOTE
neck cut was completed posteriorly with an osteotome. The femoral head was then removed with a corkscrew device. Inspection of the femoral head showed loss of cartilage and areas of eburnated bone with osteophytes. It was then measured on the back table as a size 48 mm. Retractors were then positioned to expose the acetabulum with care to ensure positioning the anterior retractor carefully over the anterior portion of the acetabulum protecting both the femoral vessels and the nerve. The acetabulum was cleared of soft tissue with a rongeur and bleeding sealed with Aquamantis cautery. The remaining labrum was debrided with electrocautery. Acetabular reaming then began with a size 47 mm reamer. The acetabular reaming was continued up in 2 mm increments until removal of the cartilage and sclerotic bone was complete and a good area of bleeding bone was encountered. Fluoroscopy was used intermittently to verify the trajectory of the reaming as well as its depth relative to the teardrop. The area was thoroughly irrigated and the acetabular cup was then impacted into position under fluoroscopic guidance. It showed excellent fit with opposition to the pelvis and no evidence of micro-motion. The impactor was removed and the acetabular screw was placed in the safe zone. The polyethylene liner was then inserted and snapped into position and shown to be flush with the components as per technique. Attention was then turned to the femoral side, the support hook was placed laterally over the vastus lateralis and the hip was brought into full external rotation at about 110 degrees. The hip was then extended and abducted. The support hook was secured to the table and the proximal portion of the femur was carefully raised under direct visualization to improve exposure. Retractors were positioned along the femoral calcar and posteriorly to protect the tensor fascia muscle.   Carefully some the posterior capsule was released to improve exposure. The canal was then opened proximally and using a hand rasp its direction verified. The starting broach was then used with care taken to insure appropriate version relative to the posterior femoral cortex. The femur was then broached up as per technique and the final broach showed excellent seating. Its relationship to the calcar was 1-2 mm proud. A trial standard offset was placed as well as a trial 36 mm -3.5 mm femoral ball. The femoral support hook was lowered and the hip was brought back into the reduced position. Fluoroscopic views were obtained of the AP pelvis and referencing the lesser trochanter on the contralateral hip and estimation of offset and limb length were made. They were judged to be about 5 mm longer than her contralateral hip and an extended offset was trailed which improved the hip shuck without increasing the length. This was the best construct. The hip was then dislocated again and placed back into the broaching position. The trials were removed and the area was thoroughly irrigated. The final femoral implant was then impacted into position and shown to be resting at the level of the calcar similar to the trials. The final femoral head was also impacted into position and shown to be secured. The femoral support hook was removed and the hip was brought into reduction again. Fluoroscopic views showed similar limb length and offset relative to the trial reduction. The joint shuck on the operative table was satisfactory. The hip was brought through range of motion after removing the limb from the spar and brought to flexion and shown to be stable. External  rotation with the hip extended was satisfactory and there was no evidence of component impingement. The area was thoroughly irrigated again. The aquamantis was used to verify adequacy of hemostasis by treating the capsular structures.   An injection of orthopedic analgesic mixture was carefully infiltrated with careful aspiration around the capsular area to improve postoperative analgesia. A solution of dilute chlorhexidine was placed in the wound and allowed to sit for 2-3 minutes to aid in bacterial control. It was then removed with suction and cleansed again with pulsatile lavage. The tensor fascia maryam fascia was then closed with running #2 Strata Fix suture. Skin was closed with 2-0 Vicryl and 3-0 Monocryl. Dermabond and Prineo dressing was applied and allowed to dry, then telfa and tegaderm were used to seal the wound. The patient was then removed from the operative table, awakened and taken to recovery room in stable condition having tolerated the procedure well. The patient's foot was noted to be warm and pink color removal of the traction boot. No significant skin lesions were noted either on the feet. All needle and sponge counts matched the initial count per circulating and scrub personnel x2 prior to terminating this case. Eduard Del Castillo PA-C assisted me during this surgery. His services were required to assist with the procedure by providing help with patient positioning and prepping, exposure, retraction and closure. Shamir Mcarthur, [address] [place] [address] and Sports Medicine  9/7/21  8:15 AM EDT        Electronically signed by Nancy Abad MD on 9/7/2021 at 8:13 AM

## 2021-09-07 NOTE — PROGRESS NOTES
Physical Therapy    Facility/Department: [facility] OR  Initial Assessment    NAME: Yovany Kitchen  : 1965  MRN: [id number]    Date of Service: 2021    Discharge Recommendations: Yovany Kitchen scored a 18/24 on the AM-PAC short mobility form. Current research shows that an AM-PAC score of 18 or greater is typically associated with a discharge to the patient's home setting. Based on the patient's AM-PAC score and their current functional mobility deficits, it is recommended that the patient have 2-3 sessions per week of Physical Therapy at d/c to increase the patient's independence. At this time, this patient demonstrates the endurance and safety to discharge home with OP services and a follow up treatment frequency of 2-3x/wk. Please see assessment section for further patient specific details. If patient discharges prior to next session this note will serve as a discharge summary. Please see below for the latest assessment towards goals. PT Equipment Recommendations  Equipment Needed: No    Assessment   Body structures, Functions, Activity limitations: Decreased functional mobility ; Decreased sensation;Decreased ADL status; Decreased ROM; Decreased strength;Decreased balance;Decreased safe awareness;Decreased endurance;Decreased coordination  Assessment: Pt presents s/p PAULETTE w/decreased functional mobility, endurance, ROM, strength, and balance limiting full participation in ADLs and prolonged standing and ambulating. Pt w/hypotension during first session, so unable to assess mobility at this time. At second session, pt able to ambulate to bathroom and walk around 300 ft using RW and CGA. Required frequent verbal cues to slow down gait while ambulating d/t decreased safety awareness. Pt will continue to benefit from further OP therapy to address limitations and promote return to PLOF.   Treatment Diagnosis: s/p R PAULETTE limiting functional mobility, endurance, ROM, strength, and balance  Prognosis: dizzy and sore, but has no pain currently. Orientation  Orientation  Overall Orientation Status: Within Normal Limits  Social/Functional History  Social/Functional History  Lives With: Spouse  Type of Home: House  Home Layout: Multi-level, Able to Live on Main level with bedroom/bathroom  Home Access: Stairs to enter with rails  Entrance Stairs - Number of Steps: 3-4  Entrance Stairs - Rails: Right  Bathroom Shower/Tub: Walk-in shower  Bathroom Toilet: Standard  Bathroom Accessibility: Walker accessible  ADL Assistance: Independent  Homemaking Assistance: Independent  Homemaking Responsibilities: Yes  Ambulation Assistance: Independent  Transfer Assistance: Independent  Active : Yes  Occupation: Full time employment  Type of occupation: [employer]  Additional Comments: pt denies falls  Cognition   Cognition  Overall Cognitive Status: WNL    Objective  AROM RLE (degrees)  RLE General AROM: limited d/t s/p R PAULETTE  AROM LLE (degrees)  LLE AROM : WFL  Strength RLE  Comment: not assessed d/t s/p R PAULETTE  Strength LLE  Strength LLE: WFL     Sensation  Overall Sensation Status: WFL  Bed mobility  Supine to Sit: Stand by assistance  Sit to Supine: Stand by assistance  Scooting: Stand by assistance  Comment: Dizzy when seated EOB, /73. Attempted stand at EOB. CGA to min A when getting back to bed d/t dizziness, BP 76/44. At second session, pt had dizziness, but was able to perform bed mobility w/no significant issues. Transfers  Sit to Stand: Stand by assistance  Stand to sit: Stand by assistance  Comment: Pt performed first session w/increased dizziness and nausea, able to weight shift, but unable to stay standing d/t unstable vitals. Pt able to stand longer second session and could ambulate to bathroom w/o hypotensive symptoms occurring.   Ambulation  Ambulation?: Yes  WB Status: RLE WBAT  Ambulation 1  Surface: level tile  Device: Rolling Walker  Assistance: Contact guard assistance  Quality of Gait: increased allen, RLE circumduction during swing phase  Gait Deviations: Increased SOPHIA  Distance: 300 ft  Comments: Pt impulsive during ambulation w/quick allen requiring verbal cues to slow down. Stairs/Curb  Stairs?: Yes  Stairs  # Steps : 1  Stairs Height: 6\"  Rails: None  Device: Rolling walker  Assistance: Contact guard assistance  Comment: Pt educated on ascending step w/LLE and descending step w/RLE. Balance  Posture: Good  Sitting - Static: Good (SBA)  Sitting - Dynamic: Good (SBA)  Standing - Static: Fair;+ (CGA w/periods of SBA)  Standing - Dynamic: Fair;+ (CGA)  Comments: Able to weight shift appropriately between both LEs while standing w/RW and had no LOB during gait even though pt ambulated w/increased allen. Plan   Safety Devices  Type of devices: Gait belt, Patient at risk for falls, Left in bed, Nurse notified  Restraints  Initially in place: No    AM-PAC Score  AM-PAC Inpatient Mobility Raw Score : 18 (09/07/21 1452)  AM-PAC Inpatient T-Scale Score : 43.63 (09/07/21 1452)  Mobility Inpatient CMS 0-100% Score: 46.58 (09/07/21 1452)  Mobility Inpatient CMS G-Code Modifier : CK (09/07/21 1452)          Goals  Pt d/c this date. Therapy Time   Individual Concurrent Group Co-treatment   Time In 7915         Time Out 1111         Minutes 18             Second Session Therapy Time:   Individual Concurrent Group Co-treatment   Time In 1355         Time Out 1417         Minutes 22           Timed Code Treatment Minutes:  0 (minutes shared with OT due to OBS status)    Total Treatment Minutes:  [address]  [address], SPT  Anna Ramos PT   I agree with the above note. PT directly observed the SPT with the patient.   Slava Hernandez [place] DPT [id number]

## 2021-09-07 NOTE — PROGRESS NOTES
Pt arrived from OR to PACU, arouses to stimuli. VSS, O2 sats 100% on 6 L simple mask. Dressing to right hip dry and intact, ice pack placed. Right pedal and posterior tibial pulses palpable, foot cool. Will monitor.

## 2021-09-07 NOTE — PROGRESS NOTES
Occupational Therapy   Occupational Therapy Initial Assessment  Date: 2021   Patient Name: Sourav Hartley  MRN: [id number]     : 1965    Date of Service: 2021    Discharge Recommendations:  Sourav Hartley scored a 20/24 on the AM-PAC ADL Inpatient form. Current research shows that an AM-PAC score of 18 or greater is typically associated with a discharge to the patient's home setting. Based on the patient's AM-PAC score, and their current ADL deficits, it is recommended that the patient have 2-3 sessions per week of Occupational Therapy at d/c to increase the patient's independence. At this time, this patient demonstrates the endurance and safety to discharge home with home health (home vs OP services) and a follow up treatment frequency of 2-3x/wk. Please see assessment section for further patient specific details. If patient discharges prior to next session this note will serve as a discharge summary. Please see below for the latest assessment towards goals. OT Equipment Recommendations  Equipment Needed: No    Assessment   Performance deficits / Impairments: Decreased functional mobility ; Decreased ADL status; Decreased high-level IADLs  Assessment: pt not at baseline and would benefit from ongoing skilled OT services in order to return to PLOF  Treatment Diagnosis: R PAULETTE  Prognosis: Good  Decision Making: Low Complexity  History: pt lives with family, independent at baseline, works full time  Patient Education: eval, POC, discharge, transfers-pt independent  REQUIRES OT FOLLOW UP: Yes  Activity Tolerance  Activity Tolerance: Patient Tolerated treatment well  Safety Devices  Safety Devices in place: Yes  Type of devices: All fall risk precautions in place;Nurse notified;Call light within reach; Patient at risk for falls; Left in bed (seen in PACU, no bed alarm)           Patient Diagnosis(es): The primary encounter diagnosis was Preop testing.  A diagnosis of Primary osteoarthritis of right hip was also pertinent to this visit. has a past medical history of Allergic rhinitis, Cough, GERD (gastroesophageal reflux disease), and Kidney stones. has a past surgical history that includes Upper gastrointestinal endoscopy; Lithotripsy; Ureter stent placement; and Breast biopsy. Treatment Diagnosis: R PAULETTE      Restrictions  Restrictions/Precautions  Restrictions/Precautions: Fall Risk, Weight Bearing (med fall risk)  Lower Extremity Weight Bearing Restrictions  Right Lower Extremity Weight Bearing: Weight Bearing As Tolerated  Position Activity Restriction  Hip Precautions:  (anterior precautions: no SLR)  Other position/activity restrictions: pt admitted for R PAULETTE    Subjective      Vital Signs  Pulse: 90  Heart Rate Source: Monitor  Resp: 15  BP: (!) 105/92  BP Location: Right upper arm  MAP (mmHg): 96  Patient Position: Semi fowlers  Level of Consciousness: Alert (0)  Oxygen Therapy  SpO2: 99 %  Pulse Oximeter Device Mode: Continuous  Pulse Oximeter Device Location: Finger  O2 Device: None (Room air)  O2 Flow Rate (L/min): 0 L/min  Social/Functional History  Social/Functional History  Lives With: Spouse  Type of Home: House  Home Layout: Multi-level, Able to Live on Main level with bedroom/bathroom  Home Access: Stairs to enter with rails  Entrance Stairs - Number of Steps: 3-4  Entrance Stairs - Rails: Right  Bathroom Shower/Tub: Walk-in shower  Bathroom Toilet: Standard  Bathroom Accessibility: Walker accessible  ADL Assistance: Independent  Homemaking Assistance: Independent  Homemaking Responsibilities: Yes  Ambulation Assistance: Independent  Transfer Assistance: Independent  Active : Yes  Occupation: Full time employment  Type of occupation: [employer]  Additional Comments: pt denies falls       Objective        Orientation  Overall Orientation Status: Within Normal Limits  Observation/Palpation  Observation: pale in color, reporting nausea.  BP upon arrival 109/73  Balance  Sitting Balance: Supervision  Standing Balance: Minimal assistance (CGA progressing to Rosalinda)  Standing Balance  Time: ~1-2 minutes  Activity: sat EOB ~10 minutes, attempted stand EOB, hypotensive with BP 76/44. returned to sitting  Functional Mobility  Functional - Mobility Device:  (unable to ambulate d/t hypotension. BP 76/44. will return for second session)  ADL  Feeding: Independent  UE Bathing: Modified independent  (donning gown over back)  Tone RUE  RUE Tone: Normotonic  Tone LUE  LUE Tone: Normotonic  Coordination  Movements Are Fluid And Coordinated: Yes     Bed mobility  Supine to Sit: Stand by assistance  Sit to Supine: Stand by assistance  Scooting: Stand by assistance  Comment: dizzy upon sitting, /73. attempted stand EOB. CGA to Rosalinda d/t dizziness, BP 76/44  Transfers  Sit to stand: Contact guard assistance  Stand to sit: Minimal assistance  Transfer Comments: d/t dizziness, BP 76/44. returned to bed. second session to take place once pt is medically approriate. cues for hand placement     Cognition  Overall Cognitive Status: WNL  Perception  Overall Perceptual Status: WFL     Sensation  Overall Sensation Status: WFL        LUE AROM (degrees)  LUE AROM : WFL  RUE AROM (degrees)  RUE AROM : WFL  LUE Strength  Gross LUE Strength: WFL  RUE Strength  Gross RUE Strength: WFL         PM session: PT sitting up in bed upon arrival and agreeable to OT session. Seen x2 this date d/t hypotension this AM. Pt performed bed mobility while maintaining SLR precautions at SBA. Sit to stand with VCs for hand placement at SBA. Mobility to/from bathroom with CGA. Cues to slow gait and and slow turns. toilet transfer SBA with cues. Pt able to void this date. Sol care mod I. Stood at sink for hand hygiene SBA. Pt walked ~300 ft with RW. Completed curb step with correct sequence pattern. Educated on showering, IADLs with RW, HEP and ambulation to the 2nd level.  All questions answered and all needs met, RN present with pt upon leaving.   ~23 minutes with pt            Plan   Plan  Times per week: 7x  Times per day: Daily  Current Treatment Recommendations: Strengthening, Balance Training, Functional Mobility Training, Home Management Training, Self-Care / ADL      AM-PAC Score        AM-PAC Inpatient Daily Activity Raw Score: 20 (09/07/21 1247)  AM-PAC Inpatient ADL T-Scale Score : 42.03 (09/07/21 1247)  ADL Inpatient CMS 0-100% Score: 38.32 (09/07/21 1247)  ADL Inpatient CMS G-Code Modifier : Ladan Valles (09/07/21 1247)    Goals  Short term goals  Time Frame for Short term goals: discharge  Short term goal 1: bed mobility independent  Short term goal 2: functional mobility with RW mod I for ADL/IADL tasks  Short term goal 3: functional ADL transfer mod I  Short term goal 4: UB/LB ADLs mod I  Patient Goals   Patient goals : go home       Therapy Time   Individual Concurrent Group Co-treatment   Time In 5436(0572)         Time Out 5178(6099)         Minutes 18(23)           Timed Code Treatment Minutes:   0    Total Treatment Minutes:  18 minutes+23 minutes =41 minutes      Pt in Observation Statuslaith shared with [address], OTR/L [id number]      Ayde Hunt OT

## 2021-09-07 NOTE — PROGRESS NOTES
CLINICAL PHARMACY NOTE: MEDS TO BEDS    Total # of Prescriptions Filled: 3   The following medications were delivered to the patient:  Celecoxib 200mg  Aspirin 325mg  Oxycodone 5mg    Additional Documentation:    Medications delivered to room and spouse signed for    Neo Montoya

## 2021-09-07 NOTE — TELEPHONE ENCOUNTER
Called and spoke with Lc, she stated that this has been resolved. She stated that a  had one in her closet and they were able to send it home with her.

## 2021-09-07 NOTE — TELEPHONE ENCOUNTER
Trena from [facility] trying to D/C patient and need ortho vitals kit  pls call [phone]

## 2021-09-07 NOTE — ANESTHESIA PRE PROCEDURE
Department of Anesthesiology  Preprocedure Note       Name:  Elayne Mack   Age:  54 y.o.  :  1965                                          MRN:  [id number]         Date:  2021      Surgeon: Mitchel Jett):  Derick Bueno MD    Procedure: Procedure(s):  RIGHT TOTAL HIP ARTHROPLASTY ANTERIOR APPROACH - [employer]ED    Medications prior to admission:   Prior to Admission medications    Medication Sig Start Date End Date Taking?  Authorizing Provider   pantoprazole (PROTONIX) 40 MG tablet  3/16/15  Yes Historical Provider, MD   Cholecalciferol (VITAMIN D3) 50 MCG (2000) TABS Take 1 tablet by mouth daily 21  Derick Bueno MD   diclofenac (VOLTAREN) 50 MG EC tablet Take 50 mg by mouth 2 times daily  Patient not taking: Reported on 2021    Historical Provider, MD       Current medications:    Current Facility-Administered Medications   Medication Dose Route Frequency Provider Last Rate Last Admin    lactated ringers infusion   IntraVENous Continuous Derick Bueno MD        lidocaine PF 1 % injection 0.5 mL  0.5 mL IntraDERmal Once Derick Bueno MD        ceFAZolin (ANCEF) 2000 mg in dextrose 5 % 50 mL IVPB  2,000 mg IntraVENous [facility] [address], MD        ortho mix injection   Injection On Call Derick Bueno MD        tranexamic acid (CYKLOKAPRON) 1,000 mg in sodium chloride 0.9 % 50 mL IVPB  1,000 mg IntraVENous Once Derick Bueno MD        tranexamic acid (CYKLOKAPRON) 1,000 mg in sodium chloride 0.9 % 50 mL IVPB  1,000 mg IntraVENous [facility] [address], MD        meperidine (DEMEROL) injection 12.5 mg  12.5 mg IntraVENous Q5 Min PRN Joe Raymond MD        HYDROmorphone (DILAUDID) injection 0.25 mg  0.25 mg IntraVENous Q5 Min PRN Joe Raymond MD        HYDROmorphone (DILAUDID) injection 0.5 mg  0.5 mg IntraVENous Q5 Min PRN Joe Raymond MD        fentaNYL (SUBLIMAZE) injection 25 mcg  25 mcg IntraVENous Q5 Min PRN Kike Christopher MD        fentaNYL (SUBLIMAZE) injection 50 mcg  50 mcg IntraVENous Q5 Min PRN Kike Christopher MD        HYDROcodone-acetaminophen [facility]) 5-325 MG per tablet 1 tablet  1 tablet Oral PRN Kike Christopher MD        Or    HYDROcodone-acetaminophen [facility]) 5-325 MG per tablet 2 tablet  2 tablet Oral PRN Kike Christopher MD        promethazine [facility]) injection 6.25 mg  6.25 mg IntraVENous Q30 Min PRN Kike Christopher MD        diphenhydrAMINE (BENADRYL) injection 12.5 mg  12.5 mg IntraVENous Once PRN Kike Christopher MD        scopolamine (TRANSDERM-SCOP) transdermal patch 1 patch  1 patch TransDERmal Q72H Kike Christopher MD        celecoxib (CELEBREX) capsule 200 mg  200 mg Oral Once Kike Christopher MD           Allergies:     Allergies   Allergen Reactions    Benzoyl Peroxide Hives    Amoxicillin Rash       Problem List:    Patient Active Problem List   Diagnosis Code    Shortness of breath R06.02    Adhesive capsulitis M75.00    Shoulder pain M25.519    Primary osteoarthritis of right hip M16.11       Past Medical History:        Diagnosis Date    Allergic rhinitis     Cough     GERD (gastroesophageal reflux disease)     Kidney stones        Past Surgical History:        Procedure Laterality Date    BREAST BIOPSY      1997, benign    LITHOTRIPSY      UPPER GASTROINTESTINAL ENDOSCOPY      URETER STENT PLACEMENT         Social History:    Social History     Tobacco Use    Smoking status: Never Smoker    Smokeless tobacco: Never Used   Substance Use Topics    Alcohol use: Yes     Comment: rare                                Counseling given: Not Answered      Vital Signs (Current):   Vitals:    07/26/21 1250 09/07/21 0625   BP:  125/71   Pulse:  69   Resp:  18   Temp:  98.5 °F (36.9 °C)   TempSrc:  Temporal   SpO2:  100%   Weight: 160 lb (72.6 kg) 166 lb 8 oz (75.5 kg)   Height: 5' 6\" (1.676 m) BP Readings from Last 3 Encounters:   09/07/21 125/71   04/23/15 105/78       NPO Status: Time of last liquid consumption: 0000                        Time of last solid consumption: 0000                        Date of last liquid consumption: 09/06/21                        Date of last solid food consumption: 09/06/21    BMI:   Wt Readings from Last 3 Encounters:   09/07/21 166 lb 8 oz (75.5 kg)   07/14/21 165 lb (74.8 kg)   04/19/21 160 lb (72.6 kg)     Body mass index is 26.87 kg/m². CBC:   Lab Results   Component Value Date    WBC 5.4 07/26/2021    RBC 4.31 07/26/2021    HGB 14.4 07/26/2021    HCT 40.7 07/26/2021    MCV 94.6 07/26/2021    RDW 12.1 07/26/2021     07/26/2021       CMP:   Lab Results   Component Value Date     07/26/2021    K 4.6 07/26/2021     07/26/2021    CO2 25 07/26/2021    BUN 17 07/26/2021    CREATININE 0.8 07/26/2021    GFRAA >60 07/26/2021    GFRAA >60 12/14/2012    AGRATIO 2.0 07/26/2021    LABGLOM >60 07/26/2021    GLUCOSE 138 07/26/2021    PROT 7.1 07/26/2021    PROT 6.6 12/14/2012    CALCIUM 10.0 07/26/2021    BILITOT 0.3 07/26/2021    ALKPHOS 79 07/26/2021    AST 23 07/26/2021    ALT 22 07/26/2021       POC Tests: No results for input(s): POCGLU, POCNA, POCK, POCCL, POCBUN, POCHEMO, POCHCT in the last 72 hours.     Coags:   Lab Results   Component Value Date    PROTIME 11.4 07/26/2021    INR 1.01 07/26/2021    APTT 31.0 07/26/2021       HCG (If Applicable): No results found for: PREGTESTUR, PREGSERUM, HCG, HCGQUANT     ABGs: No results found for: PHART, PO2ART, ECY4ASU, CNN4GEC, BEART, Q4NZKQPR     Type & Screen (If Applicable):  No results found for: LABABO, LABRH    Drug/Infectious Status (If Applicable):  No results found for: HIV, HEPCAB    COVID-19 Screening (If Applicable): No results found for: COVID19        Anesthesia Evaluation  Patient summary reviewed and Nursing notes reviewed  Airway: Mallampati: II  TM distance: >3 FB   Neck ROM: full  Mouth opening: > = 3 FB Dental:          Pulmonary:                              Cardiovascular:  Exercise tolerance: good (>4 METS),                     Neuro/Psych:               GI/Hepatic/Renal:   (+) GERD: well controlled,           Endo/Other:                     Abdominal:             Vascular: Other Findings:             Anesthesia Plan      general     ASA 1       Induction: intravenous and rapid sequence. MIPS: Postoperative opioids intended, Prophylactic antiemetics administered and Postoperative trial extubation. Anesthetic plan and risks discussed with patient. Plan discussed with CRNA.                   Yefri Anna MD   9/7/2021

## 2021-09-08 ENCOUNTER — HOSPITAL ENCOUNTER (OUTPATIENT)
Dept: PHYSICAL THERAPY | Age: 56
Setting detail: THERAPIES SERIES
Discharge: HOME | End: 2021-09-08
Payer: COMMERCIAL

## 2021-09-08 ENCOUNTER — TELEPHONE (OUTPATIENT)
Dept: INPATIENT UNIT | Age: 56
End: 2021-09-08

## 2021-09-08 PROCEDURE — 97110 THERAPEUTIC EXERCISES: CPT

## 2021-09-08 PROCEDURE — 97161 PT EVAL LOW COMPLEX 20 MIN: CPT

## 2021-09-08 NOTE — TELEPHONE ENCOUNTER
Spoke with Patient regarding post discharge from hospital.    Incision status: no drainage, odor, or redness noted per patient    Edema/Swelling:  :none    Pain level and status: \"manageable\", taking oxycodone     Blood thinner: ASPIRIN ; Verified with patient that they are taking their anticoagulant as prescribed 1 a day. Outpatient therapy: yes, appt today, did well    Do you have all of your medications: Yes    Changes in medications: no    Using Incentive Spirometer?: Yes    Did you Attend Pre-hab?  no    Ortho Vitals: yes, explain how to login    How was your care while you were in the hospital? fine     No other questions/concerns at this time. Follow up appointment confirmed. Encouraged patient to call Orthopedic Nurse Woody Carr (#[phone]) or Orthopedic office if has any questions/concerns.        Follow up appointments:    Future Appointments   Date Time Provider Javier Fraser   9/10/2021 10:45 AM Nestora Melissa, PT [facility] [place]    9/14/2021 11:30 AM Theone Going, PT [facility] [place]    9/20/2021  7:00 AM John Blind, PT [facility]   9/20/2021  9:45 AM Rashard Ramos PA-C [facility]   9/22/2021  8:30 AM John Blind, PT [facility]   9/29/2021  5:00 PM Luis Edwards, PTA [facility] [place]    10/2/2021  9:45 AM Britany Eric, PT [facility] [place]    10/5/2021  7:00 AM Jairo Monzon, PT [facility] [place]    10/7/2021  7:00 AM Jairo Monzon, PT [facility] [place]    10/11/2021  7:00 AM John Blind, PT [facility]

## 2021-09-08 NOTE — PLAN OF CARE
[facility]  Outpatient Physical Therapy, [address] [facility], [address]  Phone: [phone]   Fax: [phone]                                                       Physical Therapy Certification    Dear Referring Practitioner: Jame Ochoa MD,    We had the pleasure of evaluating the following patient for physical therapy services at [address] [place] [address]. A summary of our findings can be found in the initial assessment below. This includes our plan of care. If you have any questions or concerns regarding these findings, please do not hesitate to contact me at the office phone number checked above. Thank you for the referral.       Physician Signature:_______________________________Date:__________________  By signing above (or electronic signature), therapists plan is approved by physician      Patient: Theodore Rosa   : 1965   MRN: [id number]  Referring Physician: Referring Practitioner: Jame Ochoa MD      Evaluation Date: 2021      Medical Diagnosis Information:  Diagnosis: M16.11 (ICD-10-CM) - Primary osteoarthritis of right hip   Treatment Diagnosis: Decreased R hip ROM, strength, muscle activation, flexibility and pain s/p R PAULETET with limitations interfering with correct gait mechanics, ADL's, IADL's, recreational and work activity. Insurance information: PT Insurance Information: [employer] (Shahla Little needed)     Precautions/ Contra-indications: n/a  Latex Allergy:  [x]NO      []YES  Preferred Language for Healthcare:   [x]English       []Other:    C-SSRS Triggered by Intake questionnaire (Past 2 wk assessment ):   [x] No, Questionnaire did not trigger screening.   [] Yes, Patient intake triggered C-SSRS Screening     [] Completed, no further action required.    [] Completed, PCP notified via Epic    SUBJECTIVE: Patient stated complaint:Pt reports having a long history of R hip pain over the years in which things progressively worsened over the past several months without reason to the point she was significantly limited on what she could/could not tolerate and finally electing to undergo PAULETTE yesterday 9/7. Pt reports she did one session of PT in the hospital and then referred to out patient PT for management of condition at this point. Pt reports her pain is not bad in sitting, really sore and moderate pain when standing and weightbearing on R LE as well as having increased pain in her knee which was an issue prior to PT but much worse now. Pt reports her muscles have completely shut down around her hip and really difficult to engage and move her hip in general. Self employed and works as OBGYN and plans to return to 1/2 day clinic hours in 2 weeks. Relevant Medical History: B knee and hip OA, GERD, kidney stones  Functional Outcome: raw score = 0; dysfunction = 100%    Pain Scale: 3-9/10  Easing factors: sitting, medication, ice, rest  Provocative factors: standing, moving leg certain ways, weight bearing. Type: []Constant   [x]Intermittent  []Radiating [x]Localized []Other:     Numbness/Tingling: n/a    Occupation/School: Physician (OBGYN)    Living Status/Prior Level of Function:Prior to this injury / incident, pt was independent with ADLs and IADLs, recreational activity including bike riding, YOGA and work activity all without issues or limitations. OBJECTIVE:   Palpation: n/a    Functional Mobility/Transfers: pt dependant on UE's for R LE movement     Posture: good    Bandages/Dressings/Incisions: no issues noted, no s/s of infection     Gait: (include devices/WB status) Pt demonstrates decreased weight bearing on R with heavy UE usage, decreased hip ext on R as well as step through pattern on L.      Dermatomes Normal Abnormal Comments   inguinal area (L1)       anterior mid-thigh (L2) x     distal ant thigh/med knee (L3) x     medial lower leg and foot (L4) x     lateral lower leg and foot (L5) x     posterior calf (S1) x     medial calcaneus (S2) x          PROM AROM    L R L R   Hip Flexion 120 90     Hip Abduction  30     Hip ER  40     Hip IR  20     Knee Flexion  130     Knee Extension  0     Dorsiflexion        Plantarflexion        Inversion        Eversion            Strength (0-5) / Myotomes  NOT Tested hip musculature due to recent surgery  Left Right   Hip Flexion - supine     Hip Flexion - seated (L1-2)     Hip Abduction     Hip Adduction     Hip ER     Hip IR     Quads (L2-4) 4- 5   Hamstrings 4- 5   Ankle Dorsiflexion (L4-5) 5 5   Ankle Plantarflexion (S1-2) 5 5   Ankle Inversion     Ankle Eversion (S1-2)     Great Toe Extension (L5)          Flexibility     Hamstrings (90/90) WNL WNL   ITB Atha Blind)     Quads (Ely's)     Hip Flexor Hildy Deis)                            [x] Patient history, allergies, meds reviewed. Medical chart reviewed. See intake form. Review Of Systems (ROS):  [x]Performed Review of systems (Integumentary, CardioPulmonary, Neurological) by intake and observation. Intake form has been scanned into medical record. Patient has been instructed to contact their primary care physician regarding ROS issues if not already being addressed at this time.       Co-morbidities/Complexities (which will affect course of rehabilitation): -  []None        []Hx of COVID   Arthritic conditions   []Rheumatoid arthritis (M05.9)  [x]Osteoarthritis (M19.91)  []Gout   Cardiovascular conditions   []Hypertension (I10)  []Hyperlipidemia (E78.5)  []Angina pectoris (I20)  []Atherosclerosis (I70)  []Pacemaker  []Hx of CABG/stent/  cardiac surgeries   Musculoskeletal conditions   []Disc pathology   []Congenital spine pathologies   []Osteoporosis (M81.8)  []Osteopenia (M85.8)  []Scoliosis       Endocrine conditions   []Hypothyroid (E03.9)  []Hyperthyroid Gastrointestinal conditions   []Constipation (T33.88)   Metabolic conditions   []Morbid obesity (E66.01)  []Diabetes type 1(E10.65) or 2 (E11.65)   []Neuropathy (G60.9)     Cardio/Pulmonary conditions   []Asthma (J45)  []Coughing   []COPD (J44.9)  []CHF  []A-fib   Psychological Disorders  []Anxiety (F41.9)  []Depression (F32.9)   []Other:   Developmental Disorders  []Autism (F84.0)  []CP (G80)  []Down Syndrome (Q90.9)  []Developmental delay     Neurological conditions  []Prior Stroke (I69.30)  []Parkinson's (G20)  []Encephalopathy (G93.40)  []MS (G35)  []Post-polio (G14)  []SCI  []TBI  []ALS Other conditions  []Fibromyalgia (M79.7)  []Vertigo  []Syncope  []Kidney Failure  []Cancer      []currently undergoing                treatment  []Pregnancy  []Incontinence   Prior surgeries  []involved limb  []previous spinal surgery  [] section birth  []hysterectomy  []bowel / bladder surgery  []other relevant surgeries   []Other:              Barriers to/and or personal factors that will affect rehab potential:              []Age  []Sex    []Smoker              []Motivation/Lack of Motivation                        []Co-Morbidities              []Cognitive Function, education/learning barriers              []Environmental, home barriers              [x]profession/work barriers  []past PT/medical experience  []other:  Justification: ambitious work timeline to return to standing/walking activity for work duties may pose negative rehab potential.     Falls Risk Assessment (30 days): -  [x] Falls Risk assessed and no intervention required. [] Falls Risk assessed and Patient requires intervention due to being higher risk   TUG score (>12s at risk):     [] Falls education provided, including        ASSESSMENT: Decreased R LE strength, ROM, muscle activation, flexibility and joint mobility s/p R PAULETTE performed  with increased swelling and pain contributing to limitations with weight bearing and walking mechanics, ADL's, IADL's, recreational and work activity.  Pt will benefit from PT to work on addressing these areas as healing continues post operatively to improve and restore functional mobility, independence strength and balance for return to PLOF. Functional Impairments:     [x]Noted lumbar/proximal hip/LE joint hypomobility   [x]Decreased LE functional ROM   [x]Decreased core/proximal hip strength and neuromuscular control   [x]Decreased LE functional strength   [x]Reduced balance/proprioceptive control   []other:      Functional Activity Limitations (from functional questionnaire and intake)   [x]Reduced ability to tolerate prolonged functional positions   [x]Reduced ability or difficulty with changes of positions or transfers between positions   [x]Reduced ability to maintain good posture and demonstrate good body mechanics with sitting, bending, and lifting   [x]Reduced ability to sleep   [x] Reduced ability or tolerance with driving and/or computer work   [x]Reduced ability to perform lifting, carrying tasks   [x]Reduced ability to squat   [x]Reduced ability to forward bend   [x]Reduced ability to ambulate prolonged functional periods/distances/surfaces   [x]Reduced ability to ascend/descend stairs   [x]Reduced ability to run, hop, cut or jump   []other:    Participation Restrictions   [x]Reduced participation in self care activities   [x]Reduced participation in home management activities   [x]Reduced participation in work activities   [x]Reduced participation in social activities. [x]Reduced participation in sport/recreation activities. Classification :    [x]Signs/symptoms consistent with post-surgical status including decreased ROM, strength and function.    []Signs/symptoms consistent with joint sprain/strain   []Signs/symptoms consistent with patella-femoral syndrome   []Signs/symptoms consistent with knee OA/hip OA   []Signs/symptoms consistent with internal derangement of knee/Hip   []Signs/symptoms consistent with functional hip weakness/NMR control      []Signs/symptoms consistent with tendinitis/tendinosis    []signs/symptoms consistent with pathology which may benefit from Dry needling      []other:      Prognosis/Rehab Potential:      [x]Excellent   []Good    []Fair   []Poor    Tolerance of evaluation/treatment:    [x]Excellent   []Good    []Fair   []Poor    Physical Therapy Evaluation Complexity Justification  [x] A history of present problem with:  [x] no personal factors and/or comorbidities that impact the plan of care;  []1-2 personal factors and/or comorbidities that impact the plan of care  []3 personal factors and/or comorbidities that impact the plan of care  [x] An examination of body systems using standardized tests and measures addressing any of the following: body structures and functions (impairments), activity limitations, and/or participation restrictions;:  [x] a total of 1-2 or more elements   [] a total of 3 or more elements   [] a total of 4 or more elements   [x] A clinical presentation with:  [x] stable and/or uncomplicated characteristics   [] evolving clinical presentation with changing characteristics  [] unstable and unpredictable characteristics;   [x] Clinical decision making of [x] low, [] moderate, [] high complexity using standardized patient assessment instrument and/or measurable assessment of functional outcome. [x] EVAL (LOW) 50860 (typically 15 minutes face-to-face)  [] EVAL (MOD) 11165 (typically 30 minutes face-to-face)  [] EVAL (HIGH) 49511 (typically 45 minutes face-to-face)  [] RE-EVAL     PLAN:   Frequency/Duration:  1-2 days per week for 6 Weeks:  Interventions:  [x]  Therapeutic exercise including: strength training, ROM, for Lower extremity and core   [x]  NMR activation and proprioception for LE, Glutes and Core   [x]  Manual therapy as indicated for LE, Hip and spine to include: Dry Needling/IASTM, STM, PROM, Gr I-IV mobilizations, manipulation.    [x] Modalities as needed that may include: thermal agents, E-stim, Biofeedback, US, iontophoresis as indicated  [x] Patient education on joint protection, postural re-education, activity modification, progression of HEP. HEP instruction: Written HEP instructions provided and reviewed     GOALS:  Patient stated goal: get walking without walking and back to work and recreational activity ASAP  [] Progressing: [] Met: [] Not Met: [] Adjusted    Therapist goals for Patient:   Short Term Goals: To be achieved in: 2 weeks  1. Independent in HEP and progression per patient tolerance, in order to prevent re-injury. [] Progressing: [] Met: [] Not Met: [] Adjusted  2. Patient will have a decrease in pain to facilitate improvement in movement, function, and ADLs as indicated by Functional Deficits. [] Progressing: [] Met: [] Not Met: [] Adjusted    Long Term Goals: To be achieved in: 6 weeks  1. Disability index score of 30% or less for the LEFS to assist with reaching prior level of function. [] Progressing: [] Met: [] Not Met: [] Adjusted  2. Patient will demonstrate increased AROM to hip flexion to 110 degrees to allow for proper joint functioning as indicated by patients Functional Deficits. [] Progressing: [] Met: [] Not Met: [] Adjusted  3. Patient will demonstrate an increase in Strength to at least 4+ as well as good proximal hip strength and control to allow for proper functional mobility as indicated by patients Functional Deficits. [] Progressing: [] Met: [] Not Met: [] Adjusted  4. Patient will return to functional activities including walking community distances without AD without increased symptoms or restriction. [] Progressing: [] Met: [] Not Met: [] Adjusted  5. Patient will be able to return to full unrestricted work activity without issues or restrictions.      [] Progressing: [] Met: [] Not Met: [] Adjusted     Electronically signed by:        Remberto Bustos, PT DPT, OMT-C

## 2021-09-08 NOTE — FLOWSHEET NOTE
Tib/Fem Mobs       Patella Mobs       Ankle mobs                         Modalities:     Pt. Education:  -pt educated on diagnosis, prognosis and expectations for rehab  -all pt questions were answered    Home Exercise Program:  Access Code: [email or web address]  URL: [email or web address]  Date: 09/08/2021  Prepared by: Myron Blind    Exercises  Hooklying Isometric Hip Abduction with Belt - 3 x daily - 7 x weekly - 10 reps - 5 hold  Supine Hip Adduction Isometric with Ball - 3 x daily - 7 x weekly - 10 reps - 5 hold  Reviewed HEP from hospital including ankle pumps, quad sets, glute sets, heel slides, hip abd slide and LAQ    Therapeutic Exercise and NMR EXR  [x] (06322) Provided verbal/tactile cueing for activities related to strengthening, flexibility, endurance, ROM for improvements in LE, proximal hip, and core control with self care, mobility, lifting, ambulation.  [] (20340) Provided verbal/tactile cueing for activities related to improving balance, coordination, kinesthetic sense, posture, motor skill, proprioception  to assist with LE, proximal hip, and core control in self care, mobility, lifting, ambulation and eccentric single leg control.   [] (20880) Therapist is in constant attendance of 2 or more patients providing skilled therapy interventions, but not providing any significant amount of measurable one-on-one time to either patient, for improvements in LE, proximal hip, and core control in self care, mobility, lifting, ambulation and eccentric single leg control.      NMR and Therapeutic Activities:    [] (07819 or 47202) Provided verbal/tactile cueing for activities related to improving balance, coordination, kinesthetic sense, posture, motor skill, proprioception and motor activation to allow for proper function of core, proximal hip and LE with self care and ADLs  [] (46427) Gait Re-education- Provided training and instruction to the patient for proper LE, core and proximal hip recruitment and positioning and eccentric body weight control with ambulation re-education including up and down stairs     Home Exercise Program:    [x] (63055) Reviewed/Progressed HEP activities related to strengthening, flexibility, endurance, ROM of core, proximal hip and LE for functional self-care, mobility, lifting and ambulation/stair navigation   [] (96962)Reviewed/Progressed HEP activities related to improving balance, coordination, kinesthetic sense, posture, motor skill, proprioception of core, proximal hip and LE for self care, mobility, lifting, and ambulation/stair navigation      Manual Treatments:  PROM / STM / Oscillations-Mobs:  G-I, II, III, IV (PA's, Inf., Post.)  [] (17259) Provided manual therapy to mobilize LE, proximal hip and/or LS spine soft tissue/joints for the purpose of modulating pain, promoting relaxation,  increasing ROM, reducing/eliminating soft tissue swelling/inflammation/restriction, improving soft tissue extensibility and allowing for proper ROM for normal function with self care, mobility, lifting and ambulation. Modalities:  [] (26761) Vasopneumatic compression: Utilized vasopneumatic compression to decrease edema / swelling for the purpose of improving mobility and quad tone / recruitment which will allow for increased overall function including but not limited to self-care, transfers, ambulation, and ascending / descending stairs.        Charges:  Timed Code Treatment Minutes: 15   Total Treatment Minutes: 40     [x] EVAL - LOW (68885)   [] EVAL - MOD (98207)  [] EVAL - HIGH (51126)  [] RE-EVAL (15392)  [x] GM(54649) x 1     [] Ionto  [] NMR (28049) x      [] Vaso  [] Manual (06622) x      [] Ultrasound  [] TA x       [] Mech Traction (05790)  [] Aquatic Therapy x     [] ES (un) (56937):   [] Home Management Training x [] ES(attended) (57073)   [] Group:     [] Other:     GOALS:  Patient stated goal: get walking without walking and back to work and recreational activity ASAP  [] Progressing: [] Met: [] Not Met: [] Adjusted    Therapist goals for Patient:   Short Term Goals: To be achieved in: 2 weeks  1. Independent in HEP and progression per patient tolerance, in order to prevent re-injury. [] Progressing: [] Met: [] Not Met: [] Adjusted  2. Patient will have a decrease in pain to facilitate improvement in movement, function, and ADLs as indicated by Functional Deficits. [] Progressing: [] Met: [] Not Met: [] Adjusted    Long Term Goals: To be achieved in: 6 weeks  1. Disability index score of 30% or less for the LEFS to assist with reaching prior level of function. [] Progressing: [] Met: [] Not Met: [] Adjusted  2. Patient will demonstrate increased AROM to hip flexion to 110 degrees to allow for proper joint functioning as indicated by patients Functional Deficits. [] Progressing: [] Met: [] Not Met: [] Adjusted  3. Patient will demonstrate an increase in Strength to at least 4+ as well as good proximal hip strength and control to allow for proper functional mobility as indicated by patients Functional Deficits. [] Progressing: [] Met: [] Not Met: [] Adjusted  4. Patient will return to functional activities including walking community distances without AD without increased symptoms or restriction. [] Progressing: [] Met: [] Not Met: [] Adjusted  5. Patient will be able to return to full unrestricted work activity without issues or restrictions. [] Progressing: [] Met: [] Not Met: [] Adjusted     Overall Progression Towards Functional goals/ Treatment Progress Update:  [] Patient is progressing as expected towards functional goals listed. [] Progression is slowed due to complexities/Impairments listed. [] Progression has been slowed due to co-morbidities.   [x] Plan just implemented, too soon to assess goals progression <30days   [] Goals require adjustment due to lack of progress  [] Patient is not progressing as expected and requires additional follow up with physician  [] Other    Persisting Functional Limitations/Impairments:  []Sitting [x]Standing   [x]Walking [x]Stairs   [x]Transfers [x]ADLs   [x]Squatting/bending [x]Kneeling  [x]Housework [x]Job related tasks  [x]Driving [x]Sports/Recreation   [x]Sleeping []Other:    ASSESSMENT:  See foreignal    Treatment/Activity Tolerance:  [x] Pt able to complete treatment [] Patient limited by fatique  [] Patient limited by pain  [] Patient limited by other medical complications  [] Other:     Prognosis: [x] Good [] Fair  [] Poor    Patient Requires Follow-up: [x] Yes  [] No    Return to Play:    [x]  N/A   []  Stage 1: Intro to Strength   []  Stage 2: Return to Run and Strength   []  Stage 3: Return to Jump and Strength   []  Stage 4: Dynamic Strength and Agility   []  Stage 5: Sport Specific Training     []  Ready to Return to Play, Meets All Above Stages   []  Not Ready for Return to Sports   Comments:            PLAN: See eval. PT 1-2x / week for 6 weeks. [] Continue per plan of care [] Alter current plan (see comments)  [x] Plan of care initiated [] Hold pending MD visit [] Discharge    Electronically signed by: Chuck Pyle PT PT, DPT      Note: If patient does not return for scheduled/ recommended follow up visits, this note will serve as a discharge from care along with most recent update on progress.

## 2021-09-10 ENCOUNTER — HOSPITAL ENCOUNTER (OUTPATIENT)
Dept: PHYSICAL THERAPY | Age: 56
Setting detail: THERAPIES SERIES
Discharge: HOME | End: 2021-09-10
Payer: COMMERCIAL

## 2021-09-10 NOTE — PROGRESS NOTES
Physical Therapy  Cancellation/No-show Note  Patient Name:  Maurilio Howard  :  1965   Date:  9/10/2021  Cancelled visits to date: 0  No-shows to date: 1    Patient status for today's appointment patient:  []  Cancelled    []  Rescheduled appointment  [x]  No-show  9/10     Reason given by patient:  []  Patient ill  []  Conflicting appointment  []  No transportation    []  Conflict with work  [x]  No reason given  []  Other:     Comments:      Phone call information:   [x]  Phone call made today to patient at 11:20am at number provided: [phone]      []  Patient answered, conversation as follows:    [x]  Patient did not answer, message left as follows: 9/10 - informed pt of missed visit, reminded next appt  at 1130am, requested to call if unable to make appt, provided office number   []  Phone call not made today  [] Phone call not made today - patient called in and provided reason for cancellation    Electronically signed by:  Simran Chavira, PT, DPT

## 2021-09-14 ENCOUNTER — HOSPITAL ENCOUNTER (OUTPATIENT)
Dept: PHYSICAL THERAPY | Age: 56
Setting detail: THERAPIES SERIES
Discharge: HOME | End: 2021-09-14
Payer: COMMERCIAL

## 2021-09-14 PROCEDURE — 97110 THERAPEUTIC EXERCISES: CPT

## 2021-09-14 PROCEDURE — 97112 NEUROMUSCULAR REEDUCATION: CPT

## 2021-09-14 NOTE — FLOWSHEET NOTE
[facility]  Phone: [phone]   Fax: [phone]    Physical Therapy Treatment Note/ Progress Report:     Date:  2021    Patient Name:  Cecil Trejo    :  1965  MRN: [id number]  Restrictions/Precautions:    Medical/Treatment Diagnosis Information:  Diagnosis: M16.11 (ICD-10-CM) - Primary osteoarthritis of right hip  Treatment Diagnosis: Decreased R hip ROM, strength, muscle activation, flexibility and pain s/p R PAULETTE with limitations interfering with correct gait mechanics, ADL's, IADL's, recreational and work activity. Insurance/Certification information:  PT Insurance Information: [employer] ([facility] needed)  Physician Information:  Referring Practitioner: Samia Moreno MD  Plan of care signed (Y/N): []  Yes [x]  No     Date of Patient follow up with Physician:      Progress Report: []  Yes  [x]  No     Date Range for reporting period:  Beginnin/8  Ending: -    Progress report due (10 Rx/or 30 days whichever is less): visit #10 or  (date)     Recertification due (POC duration/ or 90 days whichever is less): visit #12 or 10/20 (date)     Visit # Insurance Allowable Auth required? Date Range   2 60 [x]  Yes - COHERE  []  No -       Visits approved Visits  used Date Range   12 1  -      Latex Allergy:  [x]NO      []YES  Preferred Language for Healthcare:   [x]English       []other:    Functional Scale:        Date assessed:  LEFS: raw score = 0; dysfunction = 100%       Pain level:  5-9/10     SUBJECTIVE:  Pt notes that she is moving around better with the RW. Does c/o stiffness when she first gets out of a chair. OBJECTIVE:       RESTRICTIONS/PRECAUTIONS: No SLR until 10/5.  DOS     Exercises/Interventions:     Therapeutic Exercise (06771)  Resistance / level Sets/sec Reps Notes / Cues   Bike npv      SAQ 3# 2 12    Bridging  1 30    SL clams  3 8    SL hip abduction  4 5           BLE leg press  110# 3 10 Therapeutic Activities (64133)       Sit to stand +airex 1 10 Cues for neutral spine at start and end of movement   Gait training  5 min  With [facility]                 Neuromuscular Re-ed (27563)       Seated pelvic tilts  1 10 Finding neutral spine   Neutral spine sitting with alt shoulder press 3# 2 10    Neutral spine sitting with pallof press orange 1 15x           Manual Intervention (97419)       Hip PROM  5 min  assessment   Tib/Fem Mobs       Patella Mobs       Ankle mobs                         Modalities:     Pt. Education:  -pt educated on diagnosis, prognosis and expectations for rehab  -all pt questions were answered    Home Exercise Program:  Access Code: [email or web address]  URL: [employer]/  Date: 09/08/2021  Prepared by: Lali Coles    Exercises  Hooklying Isometric Hip Abduction with Belt - 3 x daily - 7 x weekly - 10 reps - 5 hold  Supine Hip Adduction Isometric with Ball - 3 x daily - 7 x weekly - 10 reps - 5 hold  Reviewed HEP from hospital including ankle pumps, quad sets, glute sets, heel slides, hip abd slide and LAQ    Therapeutic Exercise and NMR EXR  [x] (70379) Provided verbal/tactile cueing for activities related to strengthening, flexibility, endurance, ROM for improvements in LE, proximal hip, and core control with self care, mobility, lifting, ambulation.  [] (95211) Provided verbal/tactile cueing for activities related to improving balance, coordination, kinesthetic sense, posture, motor skill, proprioception  to assist with LE, proximal hip, and core control in self care, mobility, lifting, ambulation and eccentric single leg control.   [] (02834) Therapist is in constant attendance of 2 or more patients providing skilled therapy interventions, but not providing any significant amount of measurable one-on-one time to either patient, for improvements in LE, proximal hip, and core control in self care, mobility, lifting, ambulation and eccentric single leg control. NMR and Therapeutic Activities:    [x] (32896 or 28484) Provided verbal/tactile cueing for activities related to improving balance, coordination, kinesthetic sense, posture, motor skill, proprioception and motor activation to allow for proper function of core, proximal hip and LE with self care and ADLs  [] (45335) Gait Re-education- Provided training and instruction to the patient for proper LE, core and proximal hip recruitment and positioning and eccentric body weight control with ambulation re-education including up and down stairs     Home Exercise Program:    [x] (66710) Reviewed/Progressed HEP activities related to strengthening, flexibility, endurance, ROM of core, proximal hip and LE for functional self-care, mobility, lifting and ambulation/stair navigation   [] (71680)Reviewed/Progressed HEP activities related to improving balance, coordination, kinesthetic sense, posture, motor skill, proprioception of core, proximal hip and LE for self care, mobility, lifting, and ambulation/stair navigation      Manual Treatments:  PROM / STM / Oscillations-Mobs:  G-I, II, III, IV (PA's, Inf., Post.)  [x] (58212) Provided manual therapy to mobilize LE, proximal hip and/or LS spine soft tissue/joints for the purpose of modulating pain, promoting relaxation,  increasing ROM, reducing/eliminating soft tissue swelling/inflammation/restriction, improving soft tissue extensibility and allowing for proper ROM for normal function with self care, mobility, lifting and ambulation. Modalities:  [x] (36347) Vasopneumatic compression: Utilized vasopneumatic compression to decrease edema / swelling for the purpose of improving mobility and quad tone / recruitment which will allow for increased overall function including but not limited to self-care, transfers, ambulation, and ascending / descending stairs.        Charges:  Timed Code Treatment Minutes: 45   Total Treatment Minutes: 45     [] EVAL - LOW (12210)   [] EVAL - MOD (54301)  [] EVAL - HIGH (04201)  [] RE-EVAL (79084)  [x] IH(41557) x 2     [] Ionto  [x] NMR (23926) x  1    [] Vaso  [] Manual (24799) x      [] Ultrasound  [] TA x       [] Mech Traction (51365)  [] Aquatic Therapy x     [] ES (un) (23709):   [] Home Management Training x [] ES(attended) (53961)   [] Group:     [] Other:     GOALS:  Patient stated goal: get walking without walking and back to work and recreational activity ASAP  [] Progressing: [] Met: [] Not Met: [] Adjusted    Therapist goals for Patient:   Short Term Goals: To be achieved in: 2 weeks  1. Independent in HEP and progression per patient tolerance, in order to prevent re-injury. [] Progressing: [] Met: [] Not Met: [] Adjusted  2. Patient will have a decrease in pain to facilitate improvement in movement, function, and ADLs as indicated by Functional Deficits. [] Progressing: [] Met: [] Not Met: [] Adjusted    Long Term Goals: To be achieved in: 6 weeks  1. Disability index score of 30% or less for the LEFS to assist with reaching prior level of function. [] Progressing: [] Met: [] Not Met: [] Adjusted  2. Patient will demonstrate increased AROM to hip flexion to 110 degrees to allow for proper joint functioning as indicated by patients Functional Deficits. [] Progressing: [] Met: [] Not Met: [] Adjusted  3. Patient will demonstrate an increase in Strength to at least 4+ as well as good proximal hip strength and control to allow for proper functional mobility as indicated by patients Functional Deficits. [] Progressing: [] Met: [] Not Met: [] Adjusted  4. Patient will return to functional activities including walking community distances without AD without increased symptoms or restriction. [] Progressing: [] Met: [] Not Met: [] Adjusted  5. Patient will be able to return to full unrestricted work activity without issues or restrictions.      [] Progressing: [] Met: [] Not Met: [] Adjusted     Overall Progression Towards Functional goals/ Treatment Progress Update:  [] Patient is progressing as expected towards functional goals listed. [] Progression is slowed due to complexities/Impairments listed. [] Progression has been slowed due to co-morbidities. [x] Plan just implemented, too soon to assess goals progression <30days   [] Goals require adjustment due to lack of progress  [] Patient is not progressing as expected and requires additional follow up with physician  [] Other    Persisting Functional Limitations/Impairments:  []Sitting [x]Standing   [x]Walking [x]Stairs   [x]Transfers [x]ADLs   [x]Squatting/bending [x]Kneeling  [x]Housework [x]Job related tasks  [x]Driving [x]Sports/Recreation   [x]Sleeping []Other:    ASSESSMENT:  Pt demonstrates good hip flexion PROM, some tenderness into ER. Difficulty finding neutral spine in both sitting and standing. Pt tends to stand with increased lumbar lordosis/anterior pelvic tilt with erector spinae hypertonicity. This will require additional work in future visits along with hip strengthening. Treatment/Activity Tolerance:  [x] Pt able to complete treatment [] Patient limited by fatique  [] Patient limited by pain  [] Patient limited by other medical complications  [] Other:     Prognosis: [x] Good [] Fair  [] Poor    Patient Requires Follow-up: [x] Yes  [] No             PLAN: PT 1-2x / week for 6 weeks. [x] Continue per plan of care [] Alter current plan (see comments)  [] Plan of care initiated [] Hold pending MD visit [] Discharge    Electronically signed by: Crispin Goldstein PT PT, DPT      Note: If patient does not return for scheduled/ recommended follow up visits, this note will serve as a discharge from care along with most recent update on progress.

## 2021-09-17 ENCOUNTER — HOSPITAL ENCOUNTER (OUTPATIENT)
Dept: PHYSICAL THERAPY | Age: 56
Setting detail: THERAPIES SERIES
Discharge: HOME | End: 2021-09-17
Payer: COMMERCIAL

## 2021-09-17 PROCEDURE — 97530 THERAPEUTIC ACTIVITIES: CPT

## 2021-09-17 PROCEDURE — 97110 THERAPEUTIC EXERCISES: CPT

## 2021-09-17 PROCEDURE — 97112 NEUROMUSCULAR REEDUCATION: CPT

## 2021-09-17 NOTE — FLOWSHEET NOTE
coordination, kinesthetic sense, posture, motor skill, proprioception  to assist with LE, proximal hip, and core control in self care, mobility, lifting, ambulation and eccentric single leg control.   [] (25366) Therapist is in constant attendance of 2 or more patients providing skilled therapy interventions, but not providing any significant amount of measurable one-on-one time to either patient, for improvements in LE, proximal hip, and core control in self care, mobility, lifting, ambulation and eccentric single leg control.      NMR and Therapeutic Activities:    [x] (90241 or 53533) Provided verbal/tactile cueing for activities related to improving balance, coordination, kinesthetic sense, posture, motor skill, proprioception and motor activation to allow for proper function of core, proximal hip and LE with self care and ADLs  [] (43489) Gait Re-education- Provided training and instruction to the patient for proper LE, core and proximal hip recruitment and positioning and eccentric body weight control with ambulation re-education including up and down stairs     Home Exercise Program:    [x] (50608) Reviewed/Progressed HEP activities related to strengthening, flexibility, endurance, ROM of core, proximal hip and LE for functional self-care, mobility, lifting and ambulation/stair navigation   [] (49334)Reviewed/Progressed HEP activities related to improving balance, coordination, kinesthetic sense, posture, motor skill, proprioception of core, proximal hip and LE for self care, mobility, lifting, and ambulation/stair navigation      Manual Treatments:  PROM / STM / Oscillations-Mobs:  G-I, II, III, IV (PA's, Inf., Post.)  [x] (26987) Provided manual therapy to mobilize LE, proximal hip and/or LS spine soft tissue/joints for the purpose of modulating pain, promoting relaxation,  increasing ROM, reducing/eliminating soft tissue swelling/inflammation/restriction, improving soft tissue extensibility and allowing for proper ROM for normal function with self care, mobility, lifting and ambulation. Modalities:  [x] (76253) Vasopneumatic compression: Utilized vasopneumatic compression to decrease edema / swelling for the purpose of improving mobility and quad tone / recruitment which will allow for increased overall function including but not limited to self-care, transfers, ambulation, and ascending / descending stairs. Charges:  Timed Code Treatment Minutes: 45   Total Treatment Minutes: 45     [] EVAL - LOW (44310)   [] EVAL - MOD (14993)  [] EVAL - HIGH (82754)  [] RE-EVAL (89318)  [x] KG(66987) x 1     [] Ionto  [x] NMR (67082) x  1    [] Vaso  [] Manual (20680) x      [] Ultrasound  [x] TA x 1      [] Mech Traction (54855)  [] Aquatic Therapy x     [] ES (un) (92985):   [] Home Management Training x [] ES(attended) (00852)   [] Group:     [] Other:     GOALS:  Patient stated goal: get walking without walking and back to work and recreational activity ASAP  [] Progressing: [] Met: [] Not Met: [] Adjusted    Therapist goals for Patient:   Short Term Goals: To be achieved in: 2 weeks  1. Independent in HEP and progression per patient tolerance, in order to prevent re-injury. [] Progressing: [] Met: [] Not Met: [] Adjusted  2. Patient will have a decrease in pain to facilitate improvement in movement, function, and ADLs as indicated by Functional Deficits. [] Progressing: [] Met: [] Not Met: [] Adjusted    Long Term Goals: To be achieved in: 6 weeks  1. Disability index score of 30% or less for the LEFS to assist with reaching prior level of function. [] Progressing: [] Met: [] Not Met: [] Adjusted  2. Patient will demonstrate increased AROM to hip flexion to 110 degrees to allow for proper joint functioning as indicated by patients Functional Deficits. [] Progressing: [] Met: [] Not Met: [] Adjusted  3.  Patient will demonstrate an increase in Strength to at least 4+ as well as good proximal hip strength and control to allow for proper functional mobility as indicated by patients Functional Deficits. [] Progressing: [] Met: [] Not Met: [] Adjusted  4. Patient will return to functional activities including walking community distances without AD without increased symptoms or restriction. [] Progressing: [] Met: [] Not Met: [] Adjusted  5. Patient will be able to return to full unrestricted work activity without issues or restrictions. [] Progressing: [] Met: [] Not Met: [] Adjusted     Overall Progression Towards Functional goals/ Treatment Progress Update:  [] Patient is progressing as expected towards functional goals listed. [] Progression is slowed due to complexities/Impairments listed. [] Progression has been slowed due to co-morbidities. [x] Plan just implemented, too soon to assess goals progression <30days   [] Goals require adjustment due to lack of progress  [] Patient is not progressing as expected and requires additional follow up with physician  [] Other    Persisting Functional Limitations/Impairments:  []Sitting [x]Standing   [x]Walking [x]Stairs   [x]Transfers [x]ADLs   [x]Squatting/bending [x]Kneeling  [x]Housework [x]Job related tasks  [x]Driving [x]Sports/Recreation   [x]Sleeping []Other:    ASSESSMENT:  Pt demonstrates significant hip weakness post surgery at this time with education provided this date on gait mechanics to limit trunk rotation and circumduction of hip with swing through phase of gait. Pt struggled with balance exercises this date, especially with heel taps standing on R LE. Upgrades to routine this date as noted in bold on above flow sheet to focus on progression of strength and neuromuscular activation for improved stability post surgery.  Pt demonstrates poor endurance, strength and stability in hip at this time with need for continued skilled therapy to progress functional strength and proprioception for independent and safe balance to perform work and daily functional

## 2021-09-20 ENCOUNTER — HOSPITAL ENCOUNTER (OUTPATIENT)
Dept: PHYSICAL THERAPY | Age: 56
Setting detail: THERAPIES SERIES
Discharge: HOME | End: 2021-09-20
Payer: COMMERCIAL

## 2021-09-20 ENCOUNTER — OFFICE VISIT (OUTPATIENT)
Dept: ORTHOPEDIC SURGERY | Age: 56
End: 2021-09-20

## 2021-09-20 VITALS — HEIGHT: 66 IN | BODY MASS INDEX: 26.68 KG/M2 | WEIGHT: 166 LBS

## 2021-09-20 DIAGNOSIS — M16.11 PRIMARY OSTEOARTHRITIS OF RIGHT HIP: Primary | ICD-10-CM

## 2021-09-20 PROCEDURE — 99024 POSTOP FOLLOW-UP VISIT: CPT | Performed by: PHYSICIAN ASSISTANT

## 2021-09-20 PROCEDURE — 97140 MANUAL THERAPY 1/> REGIONS: CPT

## 2021-09-20 PROCEDURE — 97110 THERAPEUTIC EXERCISES: CPT

## 2021-09-20 PROCEDURE — 97112 NEUROMUSCULAR REEDUCATION: CPT

## 2021-09-20 RX ORDER — ACETAMINOPHEN 500 MG
500 TABLET ORAL EVERY 6 HOURS PRN
COMMUNITY

## 2021-09-20 NOTE — FLOWSHEET NOTE
[facility]  Phone: [phone]   Fax: [phone]    Physical Therapy Treatment Note/ Progress Report:     Date:  2021    Patient Name:  Linnette Moreno    :  1965  MRN: [id number]  Restrictions/Precautions:    Medical/Treatment Diagnosis Information:  Diagnosis: M16.11 (ICD-10-CM) - Primary osteoarthritis of right hip  Treatment Diagnosis: Decreased R hip ROM, strength, muscle activation, flexibility and pain s/p R PAULETTE with limitations interfering with correct gait mechanics, ADL's, IADL's, recreational and work activity. Insurance/Certification information:  PT Insurance Information: [employer] ([place] needed)  Physician Information:  Referring Practitioner: Debbie Elliott MD  Plan of care signed (Y/N): []  Yes [x]  No     Date of Patient follow up with Physician:      Progress Report: []  Yes  [x]  No     Date Range for reporting period:  Beginnin/8  Ending: -    Progress report due (10 Rx/or 30 days whichever is less): visit #10 or  (date)     Recertification due (POC duration/ or 90 days whichever is less): visit #12 or 10/20 (date)     Visit # Insurance Allowable Auth required? Date Range   4 60 [x]  Yes - COHERE  []  No -       Visits approved Visits  used Date Range   12 1  -      Latex Allergy:  [x]NO      []YES  Preferred Language for Healthcare:   [x]English       []other:    Functional Scale:        Date assessed:  LEFS: raw score = 0; dysfunction = 100%       Pain level:  1-2/10     SUBJECTIVE:  Patient feels as though R leg is longer than L leg possibly. Stands with wide SOPHIA with cane too high; adjusted cane for patient. .     OBJECTIVE:   : R innominate downslip, C8KPWCM/FRSL, ROL sacral torsion, L SI posterior innominate, R SI anterior innominate SI    RESTRICTIONS/PRECAUTIONS: No SLR until 10/5.  DOS     Exercises/Interventions:     Therapeutic Exercise (31384)  Resistance / level Sets/sec Reps Notes / Cues   Bike  5'  Added 9/17   SAQ 3# 2 12    Bridging  1 30    SL clams  2 8    SL hip abduction  4 5           BLE leg press  110# 3 10    Standing HR/TR  2 10 Added 9/17   Standing Hip flexor stretch  15\" 2 R foot back with R GH flexion 9/20   Hookline Hip ADD isos  2/5\" 5 9/20                 Total gym Squats B   1 20 9/20   Therapeutic Activities (71423)       Sit to stand +airex 1 10 Cues for neutral spine at start and end of movement   Gait training - posture  5 min  Limit trunk rotation, circumduction   Lateral side stepping npv      Cone step up/over R LE npv      Cone taps - bilateral LE  2 10 Added 9/17                 Neuromuscular Re-ed (40439)       Seated pelvic tilts  1 10 Finding neutral spine   Neutral spine sitting with alt shoulder press 3# 2 10    Neutral spine sitting with pallof press orange 1 15x    Standing hip hikes    Attempted 9/17 but unable due to pain   Modified tandem stance airex 20\" 2 Added 9/17   biodex balance npv       Total gym R SLS with L HIP AB 2 5 9/20   Hookline Neutral spine bridges Green  2 10 9/20   Hookline B Hip ER  Green 1 10 9/20   Hookline Unilateral Hip ER green 2 10 L/R 9/20   Standing Plank hip extension/mulifidus  1 20 L/R 9/20: vcs ro avoid pelvic shift/rotations   TKE standing  Green  1 15 R  9/20: vcs ro avoid pelvic shift/rotations   Manual Intervention (35145)       Hip PROM  5 min  assessment   Tib/Fem Mobs       Patella Mobs       Ankle mobs       Lumbar/SI MET : R innominate downslip, [id number]/FRSL, ROL sacral torsion, L SI posterior innominate, R SI anterior innominate SI   9/20              Modalities:     Pt. Education:  -pt educated on diagnosis, prognosis and expectations for rehab  -all pt questions were answered    Home Exercise Program:  Access Code: [email or web address]  URL: [email or web address]  Date: 09/20/2021  Prep[facility] by:  [facility]    Exercises  Hooklying Isometric Hip Abduction with Belt - 3 x daily - 7 x weekly - 10 reps - 5 hold  Supine Hip Adduction Isometric with Ball - 3 x daily - 7 x weekly - 10 reps - 5 hold  Prone Hip Extension on Table - 1 x daily - 7 x weekly - 3 sets - 10 reps  Hooklying Clamshell with Resistance - 1 x daily - 7 x weekly - 3 sets - 10 reps  Hooklying Isometric Clamshell - 1 x daily - 7 x weekly - 3 sets - 10 reps  Standing Terminal Knee Extension with Resistance - 1 x daily - 7 x weekly - 3 sets - 10 reps  Access Code: [email or web address]  URL: [email or web address]/  Date: 09/08/2021  Prepared by: Gaila Knock    Exercises  Hooklying Isometric Hip Abduction with Belt - 3 x daily - 7 x weekly - 10 reps - 5 hold  Supine Hip Adduction Isometric with Ball - 3 x daily - 7 x weekly - 10 reps - 5 hold  Reviewed HEP from hospital including ankle pumps, quad sets, glute sets, heel slides, hip abd slide and LAQ    Therapeutic Exercise and NMR EXR  [x] (25430) Provided verbal/tactile cueing for activities related to strengthening, flexibility, endurance, ROM for improvements in LE, proximal hip, and core control with self care, mobility, lifting, ambulation.  [] (09844) Provided verbal/tactile cueing for activities related to improving balance, coordination, kinesthetic sense, posture, motor skill, proprioception  to assist with LE, proximal hip, and core control in self care, mobility, lifting, ambulation and eccentric single leg control.   [] (15731) Therapist is in constant attendance of 2 or more patients providing skilled therapy interventions, but not providing any significant amount of measurable one-on-one time to either patient, for improvements in LE, proximal hip, and core control in self care, mobility, lifting, ambulation and eccentric single leg control.      NMR and Therapeutic Activities:    [x] (77469 or 66784) Provided verbal/tactile cueing for activities related to improving balance, coordination, kinesthetic sense, posture, motor skill, proprioception and motor activation to allow for proper function of core, proximal hip and LE with self care and ADLs  [] (23956) Gait Re-education- Provided training and instruction to the patient for proper LE, core and proximal hip recruitment and positioning and eccentric body weight control with ambulation re-education including up and down stairs     Home Exercise Program:    [x] (34847) Reviewed/Progressed HEP activities related to strengthening, flexibility, endurance, ROM of core, proximal hip and LE for functional self-care, mobility, lifting and ambulation/stair navigation   [] (03102)Reviewed/Progressed HEP activities related to improving balance, coordination, kinesthetic sense, posture, motor skill, proprioception of core, proximal hip and LE for self care, mobility, lifting, and ambulation/stair navigation      Manual Treatments:  PROM / STM / Oscillations-Mobs:  G-I, II, III, IV (PA's, Inf., Post.)  [x] (57975) Provided manual therapy to mobilize LE, proximal hip and/or LS spine soft tissue/joints for the purpose of modulating pain, promoting relaxation,  increasing ROM, reducing/eliminating soft tissue swelling/inflammation/restriction, improving soft tissue extensibility and allowing for proper ROM for normal function with self care, mobility, lifting and ambulation. Modalities:  [x] (90276) Vasopneumatic compression: Utilized vasopneumatic compression to decrease edema / swelling for the purpose of improving mobility and quad tone / recruitment which will allow for increased overall function including but not limited to self-care, transfers, ambulation, and ascending / descending stairs.        Charges:  Timed Code Treatment Minutes: 58   Total Treatment Minutes: 58     [] EVAL - LOW (42416)   [] EVAL - MOD (47845)  [] EVAL - HIGH (61835)  [] RE-EVAL (96311)  [x] YX(95604) x 1     [] Ionto  [x] NMR (82143) x  2   [] Vaso  [x] Manual (48715) x  1    [] Ultrasound  [] TA x 1      [] Mech Traction (85156)  [] Aquatic Therapy x     [] ES (un) (02321):   [] Home Management Training x [] ES(attended) (53104)   [] Group:     [] Other:     GOALS:  Patient stated goal: get walking without walking and back to work and recreational activity ASAP  [] Progressing: [] Met: [] Not Met: [] Adjusted    Therapist goals for Patient:   Short Term Goals: To be achieved in: 2 weeks  1. Independent in HEP and progression per patient tolerance, in order to prevent re-injury. [] Progressing: [] Met: [] Not Met: [] Adjusted  2. Patient will have a decrease in pain to facilitate improvement in movement, function, and ADLs as indicated by Functional Deficits. [] Progressing: [] Met: [] Not Met: [] Adjusted    Long Term Goals: To be achieved in: 6 weeks  1. Disability index score of 30% or less for the LEFS to assist with reaching prior level of function. [] Progressing: [] Met: [] Not Met: [] Adjusted  2. Patient will demonstrate increased AROM to hip flexion to 110 degrees to allow for proper joint functioning as indicated by patients Functional Deficits. [] Progressing: [] Met: [] Not Met: [] Adjusted  3. Patient will demonstrate an increase in Strength to at least 4+ as well as good proximal hip strength and control to allow for proper functional mobility as indicated by patients Functional Deficits. [] Progressing: [] Met: [] Not Met: [] Adjusted  4. Patient will return to functional activities including walking community distances without AD without increased symptoms or restriction. [] Progressing: [] Met: [] Not Met: [] Adjusted  5. Patient will be able to return to full unrestricted work activity without issues or restrictions. [] Progressing: [] Met: [] Not Met: [] Adjusted     Overall Progression Towards Functional goals/ Treatment Progress Update:  [x] Patient is progressing as expected towards functional goals listed. [] Progression is slowed due to complexities/Impairments listed. [] Progression has been slowed due to co-morbidities.   [] Plan just implemented, too soon to assess goals progression <30days   [] Goals require adjustment due to lack of progress  [] Patient is not progressing as expected and requires additional follow up with physician  [] Other    Persisting Functional Limitations/Impairments:  []Sitting [x]Standing   [x]Walking [x]Stairs   [x]Transfers [x]ADLs   [x]Squatting/bending [x]Kneeling  [x]Housework [x]Job related tasks  [x]Driving [x]Sports/Recreation   [x]Sleeping []Other:    ASSESSMENT:  Pt demonstrates significant hip weakness post surgery at this time both in hip and core region with education provided this date on gait mechanics to limit trunk rotation and circumduction of hip with swing through phase of gait. Patient feels as though she has a leg length discrepancy and sees MD later this date; educated patient on Lumbopelvic/SI postural malalignments which also may be impacting apparent leg legs as well as muscular tightness. P. Upgrades to routine this date as noted in bold on above flow sheet to focus on progression of strength and neuromuscular activation for improved stability post surgery as well as HEP updated. Pt demonstrates poor endurance, strength and stability in hip at this time with need for continued skilled therapy to progress functional strength and proprioception for independent and safe balance to perform work and daily functional tasks without restrictions. Treatment/Activity Tolerance:  [x] Pt able to complete treatment [] Patient limited by fatique  [] Patient limited by pain  [] Patient limited by other medical complications  [] Other:     Prognosis: [x] Good [] Fair  [] Poor    Patient Requires Follow-up: [x] Yes  [] No             PLAN: PT 1-2x / week for 6 weeks.    [x] Continue per plan of care [] Alter current plan (see comments)  [] Plan of care initiated [] Hold pending MD visit [] Discharge    Electronically signed by: Jennifer Luo PT #[id number]      Note: If patient does not return for scheduled/ recommended follow up visits, this note will serve as a discharge from care along with most recent update on progress.

## 2021-09-20 NOTE — PROGRESS NOTES
Patient Name: Jocelin Mosqueda  Medical Record Number: [id number]  YOB: 1965  Date of Encounter: 9/20/2021    Chief Complaint   Patient presents with    Post-Op Check     R PAULETTE 9/7/21       Total Hip Follow-up  Patient here for 2 weeks post right direct anterior total hip arthroplasty follow-up. Pain is controlled with occasional Percocet. The patient denies fever, wound drainage, increasing redness, pus, increasing pain, increasing swelling. Post op problems reported: Patient feels her right leg is longer than her left and this is affecting her gait. She is ambulating using a straight cane. She is working with physical therapy. Patient is still taking Vitamin D supplementation. DVT prophylaxis is ASA. The patient's  past medical history, medications, allergies,  family history, social history, and review of systems have been reviewed, and dated and are recorded in the chart under the 'MEDIA\" tab. Physical Exam:   Ms. Jocelin Mosqueda appears well, she is in no apparent distress, she demonstrates appropriate mood & affect. She is alert and oriented to person, place and time. Ht 5' 6\" (1.676 m)   Wt 166 lb (75.3 kg)   BMI 26.79 kg/m²     Right Hip: She has mild swelling which is resolving as expected. The incision is clean, dry, intact and nontender and without erythema or induration. Patient has 105 degrees of hip flexion. She has mild pain with range of motion of the hip. Patient has 4-/5 motor strength with movements of the right hip. Patient is notwearing her bilateral GREG stockings. There is no right lower extremity edema. She is neurovascularly intact distally. Radiology:   X-rays obtained and reviewed in office:  Views: AP pelvis and 2 views of the right hip. Impression: The prosthesis is well aligned. There is no evidence of loosening or subsidence. Operative leg is about 6 mm longer than left leg.     Orders:  Orders Placed This Encounter   Procedures    XR HIP 2-3 VW W PELVIS RIGHT     Impression:   Status post right total hip arthroplasty and doing very well. Plan: At this time, she will continue physical therapy. She will continue to avoid straight leg raises. Patient is concerned about her limb length discrepancy. X-rays measure 6 mm longer on the right. Advised patient this is necessary for right hip stability. I am concerned her perceived limb length discrepancy is likely coming from her back/pelvis. Advised patient this should improve over the next 8 to 10 weeks. Follow up will be in 3 week(s) and an AP pelvis and 2 views of the right hip will be obtained. Patient will return to the office sooner for worsening or changes in symptoms. Zoya Contreras was informed of the results of any imaging. We discussed her post-op course to date and a time was given to answer questions. She understand and accepts this course of care. Electronically signed by Lucila Cunningham PA-C on 0/44/1008  Board Certified [facility]    Please note that portions of this note were completed with a voice recognition program.  Efforts were made to edit the dictations but occasionally words are mis-transcribed.

## 2021-09-22 ENCOUNTER — HOSPITAL ENCOUNTER (OUTPATIENT)
Dept: PHYSICAL THERAPY | Age: 56
Setting detail: THERAPIES SERIES
Discharge: HOME | End: 2021-09-22
Payer: COMMERCIAL

## 2021-09-22 PROCEDURE — 97110 THERAPEUTIC EXERCISES: CPT

## 2021-09-22 PROCEDURE — 97112 NEUROMUSCULAR REEDUCATION: CPT

## 2021-09-22 NOTE — FLOWSHEET NOTE
[facility]  Phone: [phone]   Fax: [phone]    Physical Therapy Treatment Note/ Progress Report:     Date:  2021    Patient Name:  Irma Leal    :  1965  MRN: [id number]  Restrictions/Precautions:    Medical/Treatment Diagnosis Information:  Diagnosis: M16.11 (ICD-10-CM) - Primary osteoarthritis of right hip  Treatment Diagnosis: Decreased R hip ROM, strength, muscle activation, flexibility and pain s/p R PAULETTE with limitations interfering with correct gait mechanics, ADL's, IADL's, recreational and work activity. Insurance/Certification information:  PT Insurance Information: [employer] (Eliza alex)  Physician Information:  Referring Practitioner: Will Davis MD  Plan of care signed (Y/N): [x]  Yes []  No     Date of Patient follow up with Physician:      Progress Report: []  Yes  [x]  No     Date Range for reporting period:  Beginnin/8  Ending: -    Progress report due (10 Rx/or 30 days whichever is less): visit #10 or 87/3 (date)     Recertification due (POC duration/ or 90 days whichever is less): visit #12 or 10/20 (date)     Visit # Insurance Allowable Auth required? Date Range   5 60 [x]  Yes - COHERE  []  No -       Visits approved Visits  used Date Range   12 1  -      Latex Allergy:  [x]NO      []YES  Preferred Language for Healthcare:   [x]English       []other:    Functional Scale:        Date assessed:  LEFS: raw score = 0; dysfunction = 100%       Pain level:  3-4/10     SUBJECTIVE:  Patient states she saw ortho office and she does have a 6mm leg discrepancy but ortho office is ok with no further intervention. Patient reports she did have some increased lower back pain as well from last PT session. OBJECTIVE:   : R innominate downslip, [id number]/FRSL, ROL sacral torsion, L SI posterior innominate, R SI anterior innominate SI    RESTRICTIONS/PRECAUTIONS: No SLR until 10/5.  DOS 9/7    Exercises/Interventions:     Therapeutic Exercise (15410)  Resistance / level Sets/sec Reps Notes / Cues   uistep  5'  Added 9/17   SAQ 3# 2 12    Bridging  1 30    SL clams  2 8    SL hip abduction  4 5           BLE leg press  110# 3 10    Standing HR/TR  2 10 Added 9/17   Standing Hip flexor stretch  15\" 2 R foot back with R GH flexion 9/20   Hookline Hip ADD isos  2/5\" 5 9/20   Prone R knee flexion with pillow under abdomen  1 12 9/22          Total gym Squats B     [employer] unilateral mini squats  2    1 15    12 9/20; 9/22: ^ reps and placed folded washcloth under L heel/foot   Therapeutic Activities (15028)       Sit to stand +airex 1 10 Cues for neutral spine at start and end of movement as well as R=L WBing; moved L foot FWD 9/22   Gait training - posture  5 min  Limit trunk rotation, circumduction   Lateral side stepping npv      Cone step up/over R LE npv       - 6\" FWD tap ups L LE  2 10 Added 9/17; revised on 9/22 with vcs/tactile cues for neutral R LE alignment avoiding hoerextension in knee/ with vcs for posterior pelvic tilts   Lateral step-ups 4\" 1 12 9/22 B UE   Bacwards step off L  4\" 1 12 9/22 B UE support   Neuromuscular Re-ed (30096)       Seated pelvic tilts  1 10 Finding neutral spine   Neutral spine sitting with alt shoulder press 3# 2 10    Neutral spine sitting with pallof press orange 1 15x    Standing hip hikes    Attempted 9/17 but unable due to pain   Modified tandem stance airex 20\" 2 Added 9/17   biodex balance npv       Total gym R SLS with L HIP AB 1 5 9/20   Hookline Neutral spine bridges Green  2 10 9/20   Hookline B Hip ER  Green 1 10 9/20   Hookline Unilateral Hip ER green 2 10 L/R 9/20   Standing Plank hip extension/mulifidus  1 20 L/R 9/20: vcs ro avoid pelvic shift/rotations   Standing Standing  Blue   B GH ext  Alt GH ext    9/22: tactile cues neutral    Paloff Press Double blue 1 12 L/R 9/22   Low row Purple 1 15 9/22   TKE standing  Green  1 15 R  9/20: vcs ro avoid pelvic shift/rotations   Manual Intervention (98645)       Hip PROM  5 min  assessment   Tib/Fem Mobs       Patella Mobs R patellar correction of severe R lateral rotation with MFR R TFL followed with STM lateral patellar retinaculum and sustained media patellar glides Grade 3   9/22   Ankle mobs         9/20              Modalities:     Pt. Education:  -pt educated on diagnosis, prognosis and expectations for rehab  -all pt questions were answered    Home Exercise Program:  Access Code: [email or web address]  URL: [email or web address]  Date: 09/22/2021  Prepared by: [facility]    Exercises  Hooklying Isometric Hip Abduction with Belt - 3 x daily - 7 x weekly - 10 reps - 5 hold  Supine Hip Adduction Isometric with Ball - 3 x daily - 7 x weekly - 10 reps - 5 hold  Prone Hip Extension on Table - 1 x daily - 7 x weekly - 3 sets - 10 reps  Hooklying Clamshell with Resistance - 1 x daily - 7 x weekly - 3 sets - 10 reps  Hooklying Isometric Clamshell - 1 x daily - 7 x weekly - 3 sets - 10 reps  Standing Terminal Knee Extension with Resistance - 1 x daily - 7 x weekly - 3 sets - 10 reps  Lateral Step Up - 1 x daily - 7 x weekly - 3 sets - 10 reps  Sit to Stand - 1 x daily - 7 x weekly - 3 sets - 10 reps  Standing Heel Raise - 1 x daily - 7 x weekly - 3 sets - 10 reps  Access Code: [email or web address]  URL: [email or web address]  Date: 09/20/2021  [facility]    Exercises  Hooklying Isometric Hip Abduction with Belt - 3 x daily - 7 x weekly - 10 reps - 5 hold  Supine Hip Adduction Isometric with Ball - 3 x daily - 7 x weekly - 10 reps - 5 hold  Reviewed HEP from hospital including ankle pumps, quad sets, glute sets, heel slides, hip abd slide and LAQ    Therapeutic Exercise and NMR EXR  [x] (44648) Provided verbal/tactile cueing for activities related to strengthening, flexibility, endurance, ROM for improvements in LE, proximal hip, and core control with self care, mobility, lifting, ambulation.   [] (63510) Provided verbal/tactile cueing for activities related to improving balance, coordination, kinesthetic sense, posture, motor skill, proprioception  to assist with LE, proximal hip, and core control in self care, mobility, lifting, ambulation and eccentric single leg control.   [] (01262) Therapist is in constant attendance of 2 or more patients providing skilled therapy interventions, but not providing any significant amount of measurable one-on-one time to either patient, for improvements in LE, proximal hip, and core control in self care, mobility, lifting, ambulation and eccentric single leg control.      NMR and Therapeutic Activities:    [x] (38690 or 65581) Provided verbal/tactile cueing for activities related to improving balance, coordination, kinesthetic sense, posture, motor skill, proprioception and motor activation to allow for proper function of core, proximal hip and LE with self care and ADLs  [] (35652) Gait Re-education- Provided training and instruction to the patient for proper LE, core and proximal hip recruitment and positioning and eccentric body weight control with ambulation re-education including up and down stairs     Home Exercise Program:    [x] (11363) Reviewed/Progressed HEP activities related to strengthening, flexibility, endurance, ROM of core, proximal hip and LE for functional self-care, mobility, lifting and ambulation/stair navigation   [] (37348)Reviewed/Progressed HEP activities related to improving balance, coordination, kinesthetic sense, posture, motor skill, proprioception of core, proximal hip and LE for self care, mobility, lifting, and ambulation/stair navigation      Manual Treatments:  PROM / STM / Oscillations-Mobs:  G-I, II, III, IV (PA's, Inf., Post.)  [x] (88527) Provided manual therapy to mobilize LE, proximal hip and/or LS spine soft tissue/joints for the purpose of modulating pain, promoting relaxation,  increasing ROM, reducing/eliminating soft tissue swelling/inflammation/restriction, improving soft tissue extensibility and allowing for proper ROM for normal function with self care, mobility, lifting and ambulation. Modalities:  [x] (77615) Vasopneumatic compression: Utilized vasopneumatic compression to decrease edema / swelling for the purpose of improving mobility and quad tone / recruitment which will allow for increased overall function including but not limited to self-care, transfers, ambulation, and ascending / descending stairs. Charges:  Timed Code Treatment Minutes: 50   Total Treatment Minutes: 50     [] EVAL - LOW (75506)   [] EVAL - MOD (55427)  [] EVAL - HIGH (49825)  [] RE-EVAL (82174)  [x] RH(72228) x 1     [] Ionto  [x] NMR (66220) x  2   [] Vaso  [] Manual (57873) x  1    [] Ultrasound  [] TA x 1      [] Mech Traction (30238)  [] Aquatic Therapy x     [] ES (un) (46459):   [] Home Management Training x [] ES(attended) (65990)   [] Group:     [] Other:     GOALS:  Patient stated goal: get walking without walking and back to work and recreational activity ASAP  [] Progressing: [] Met: [] Not Met: [] Adjusted    Therapist goals for Patient:   Short Term Goals: To be achieved in: 2 weeks  1. Independent in HEP and progression per patient tolerance, in order to prevent re-injury. [] Progressing: [] Met: [] Not Met: [] Adjusted  2. Patient will have a decrease in pain to facilitate improvement in movement, function, and ADLs as indicated by Functional Deficits. [] Progressing: [] Met: [] Not Met: [] Adjusted    Long Term Goals: To be achieved in: 6 weeks  1. Disability index score of 30% or less for the LEFS to assist with reaching prior level of function. [] Progressing: [] Met: [] Not Met: [] Adjusted  2. Patient will demonstrate increased AROM to hip flexion to 110 degrees to allow for proper joint functioning as indicated by patients Functional Deficits. [] Progressing: [] Met: [] Not Met: [] Adjusted  3.  Patient will demonstrate an increase in Strength to at least 4+ as well as good proximal hip strength and control to allow for proper functional mobility as indicated by patients Functional Deficits. [] Progressing: [] Met: [] Not Met: [] Adjusted  4. Patient will return to functional activities including walking community distances without AD without increased symptoms or restriction. [] Progressing: [] Met: [] Not Met: [] Adjusted  5. Patient will be able to return to full unrestricted work activity without issues or restrictions. [] Progressing: [] Met: [] Not Met: [] Adjusted     Overall Progression Towards Functional goals/ Treatment Progress Update:  [x] Patient is progressing as expected towards functional goals listed. [] Progression is slowed due to complexities/Impairments listed. [] Progression has been slowed due to co-morbidities. [] Plan just implemented, too soon to assess goals progression <30days   [] Goals require adjustment due to lack of progress  [] Patient is not progressing as expected and requires additional follow up with physician  [] Other    Persisting Functional Limitations/Impairments:  []Sitting [x]Standing   [x]Walking [x]Stairs   [x]Transfers [x]ADLs   [x]Squatting/bending [x]Kneeling  [x]Housework [x]Job related tasks  [x]Driving [x]Sports/Recreation   [x]Sleeping []Other:    ASSESSMENT:  Patient initiated lateral step-ups this date and was easily fatigued. Pt demonstrates significant hip weakness post surgery at this time both in hip and core region with educationto limit trunk rotation/jhperlordosis and circumduction of hip with swing through phase of gait. Due to 6 mm leg length discrepancy, a lift from a washcloth was provided with weight-bearing exercises this date. After restoring muscle length/tension relationship in trunk/pelvis/hip, , patient will likely  Not need any adaptations.     Upgrades to routine this date as noted in bold on above flow sheet to focus on progression of strength and neuromuscular activation for improved stability post surgery as well as HEP updated. Pt demonstrates poor endurance, strength and stability in hip at this time with need for continued skilled therapy to progress functional strength and proprioception for independent and safe balance to perform work and daily functional tasks without restrictions. Treatment/Activity Tolerance:  [x] Pt able to complete treatment [] Patient limited by fatique  [] Patient limited by pain  [] Patient limited by other medical complications  [] Other:     Prognosis: [x] Good [] Fair  [] Poor    Patient Requires Follow-up: [x] Yes  [] No             PLAN: PT 1-2x / week for 6 weeks. [x] Continue per plan of care [] Alter current plan (see comments)  [] Plan of care initiated [] Hold pending MD visit [] Discharge    Electronically signed by: Julissa Luciano PT [id number]      Note: If patient does not return for scheduled/ recommended follow up visits, this note will serve as a discharge from care along with most recent update on progress.

## 2021-09-29 ENCOUNTER — HOSPITAL ENCOUNTER (OUTPATIENT)
Dept: PHYSICAL THERAPY | Age: 56
Setting detail: THERAPIES SERIES
Discharge: HOME | End: 2021-09-29
Payer: COMMERCIAL

## 2021-09-29 PROCEDURE — 97110 THERAPEUTIC EXERCISES: CPT | Performed by: SPECIALIST/TECHNOLOGIST

## 2021-09-29 PROCEDURE — 97140 MANUAL THERAPY 1/> REGIONS: CPT | Performed by: SPECIALIST/TECHNOLOGIST

## 2021-09-29 NOTE — FLOWSHEET NOTE
[facility]  Phone: [phone]   Fax: [phone]    Physical Therapy Treatment Note/ Progress Report:     Date:  2021    Patient Name:  Dilma Messina    :  1965  MRN: [id number]  Restrictions/Precautions:    Medical/Treatment Diagnosis Information:  Diagnosis: M16.11 (ICD-10-CM) - Primary osteoarthritis of right hip S/P 21  Treatment Diagnosis: Decreased R hip ROM, strength, muscle activation, flexibility and pain s/p R PAULETTE with limitations interfering with correct gait mechanics, ADL's, IADL's, recreational and work activity. Insurance/Certification information:  PT Insurance Information: [employer] (Efraín Ponto needed)  Physician Information:  Referring Practitioner: Mack Krishna MD  Plan of care signed (Y/N): [x]  Yes []  No     Date of Patient follow up with Physician:      Progress Report: []  Yes  [x]  No     Date Range for reporting period:  Beginnin/8  Ending: -    Progress report due (10 Rx/or 30 days whichever is less): visit #10 or  (date)     Recertification due (POC duration/ or 90 days whichever is less): visit #12 or 10/20 (date)     Visit # Insurance Allowable Auth required? Date Range   6 60 [x]  Yes - COHERE  []  No -       Visits approved Visits  used Date Range   12 1  -      Latex Allergy:  [x]NO      []YES  Preferred Language for Healthcare:   [x]English       []other:    Functional Scale:        Date assessed:  LEFS: raw score = 0; dysfunction = 100%       Pain level:  3-4/10     SUBJECTIVE:    Pt reports being very sore after being on feet a lot with work and working long days. Has some LBP, anterior hip tightness and RT patellar tendon discomfort  OBJECTIVE:   : R innominate downslip, [id number], ROL sacral torsion, L SI posterior innominate, R SI anterior innominate SI    RESTRICTIONS/PRECAUTIONS: No SLR until 10/5.  DOS     Exercises/Interventions:   Therapeutic Exercise (66581) Resistance / level Sets/sec Reps Notes / Cues   BIKE  5'  Added 9/17   Standing hip flexion off step [id number] 3x Added 9/29   Bridging HEP 1 30    SL clams  HEP 2 8    SL hip abduction  2x 10 9/29/21    Prone glute extension/ donkey Pillow under hips 2x 10 ea 9/29/21           Added 9/17   Standing Hip flexor stretch  15\" 2 R foot back with R [address] flexion 9/20 9/20   Prone R knee flexion with pillow under abdomen  2 10x  9/29/21 9/22          Total gym Squats B     [employer] unilateral mini squats  2    1 15    12 9/20; 9/22: ^ reps and placed folded washcloth under L heel/foot   Therapeutic Activities (02922)       Sit to stand +airex 1 10x Cues for neutral spine at start and end of movement as well as R=L WBing; moved L foot FWD 9/22   Updated HEP today and advised to begin icing and performing STM to anterior incision while increasing compliance to glute muscle groups9/29/21 Limit trunk rotation, circumduction   Lateral side stepping  green  2 laps 9/29/21           Cone taps - 6\" FWD tap ups bilateral L LE  2 10 Added 9/17; revised on 9/22 with vcs/tactile cues for neutral R LE alignment avoiding hoerextension in knee/ with vcs for posterior pelvic tilts   Lateral step-ups 4\" 1 12 9/22 B UE   Backwards step off L  4\" 1 12 9/22 B UE support   Neuromuscular Re-ed (76605)       Finding neutral spine         Attempted 9/17 but unable due to pain   Added 9/17   biodex balance npv       Total gym R SLS with L HIP AB 1 5 9/20 9/20 9/20 9/20 9/20: vcs ro avoid pelvic shift/rotations   Standing Standing  Blue   B GH ext  Alt GH ext    9/22: tactile cues neutral    9/22 9/22 9/20: vcs ro avoid pelvic shift/rotations   Manual Intervention (06033)       Hip PROM  5 min  assessment   Tib/Fem Mobs         9/22   Ankle mobs         9/20              Modalities:     Pt. Education:  -pt educated on diagnosis, prognosis and expectations for rehab  -all pt questions were answered    Home Exercise patient, for improvements in LE, proximal hip, and core control in self care, mobility, lifting, ambulation and eccentric single leg control. NMR and Therapeutic Activities:    [x] (54127 or 05834) Provided verbal/tactile cueing for activities related to improving balance, coordination, kinesthetic sense, posture, motor skill, proprioception and motor activation to allow for proper function of core, proximal hip and LE with self care and ADLs  [] (16718) Gait Re-education- Provided training and instruction to the patient for proper LE, core and proximal hip recruitment and positioning and eccentric body weight control with ambulation re-education including up and down stairs     Home Exercise Program:    [x] (89819) Reviewed/Progressed HEP activities related to strengthening, flexibility, endurance, ROM of core, proximal hip and LE for functional self-care, mobility, lifting and ambulation/stair navigation   [] (28297)Reviewed/Progressed HEP activities related to improving balance, coordination, kinesthetic sense, posture, motor skill, proprioception of core, proximal hip and LE for self care, mobility, lifting, and ambulation/stair navigation      Manual Treatments:  PROM / STM / Oscillations-Mobs:  G-I, II, III, IV (PA's, Inf., Post.)  [x] (98708) Provided manual therapy to mobilize LE, proximal hip and/or LS spine soft tissue/joints for the purpose of modulating pain, promoting relaxation,  increasing ROM, reducing/eliminating soft tissue swelling/inflammation/restriction, improving soft tissue extensibility and allowing for proper ROM for normal function with self care, mobility, lifting and ambulation.      Modalities:  [x] (28494) Vasopneumatic compression: Utilized vasopneumatic compression to decrease edema / swelling for the purpose of improving mobility and quad tone / recruitment which will allow for increased overall function including but not limited to self-care, transfers, ambulation, and ascending / descending stairs. Charges:  Timed Code Treatment Minutes: 50   Total Treatment Minutes: 50     [] EVAL - LOW (37184)   [] EVAL - MOD (22512)  [] EVAL - HIGH (32360)  [] RE-EVAL (45265)  [x] QY(47821) x 2     [] Ionto  [] NMR (74528) x     [] Vaso  [x] Manual (49307) x  1    [] Ultrasound  [] TA x      [] Mech Traction (35038)  [] Aquatic Therapy x     [] ES (un) (69159):   [] Home Management Training x [] ES(attended) (67361)   [] Group:     [] Other:     GOALS:  Patient stated goal: get walking without walking and back to work and recreational activity ASAP  [] Progressing: [] Met: [] Not Met: [] Adjusted    Therapist goals for Patient:   Short Term Goals: To be achieved in: 2 weeks  1. Independent in HEP and progression per patient tolerance, in order to prevent re-injury. [] Progressing: [] Met: [] Not Met: [] Adjusted  2. Patient will have a decrease in pain to facilitate improvement in movement, function, and ADLs as indicated by Functional Deficits. [] Progressing: [] Met: [] Not Met: [] Adjusted    Long Term Goals: To be achieved in: 6 weeks  1. Disability index score of 30% or less for the LEFS to assist with reaching prior level of function. [] Progressing: [] Met: [] Not Met: [] Adjusted  2. Patient will demonstrate increased AROM to hip flexion to 110 degrees to allow for proper joint functioning as indicated by patients Functional Deficits. [] Progressing: [] Met: [] Not Met: [] Adjusted  3. Patient will demonstrate an increase in Strength to at least 4+ as well as good proximal hip strength and control to allow for proper functional mobility as indicated by patients Functional Deficits. [] Progressing: [] Met: [] Not Met: [] Adjusted  4. Patient will return to functional activities including walking community distances without AD without increased symptoms or restriction. [] Progressing: [] Met: [] Not Met: [] Adjusted  5.  Patient will be able to return to full unrestricted work PLAN: PT 1-2x / week for 6 weeks. [x] Continue per plan of care [] Alter current plan (see comments)  [] Plan of care initiated [] Hold pending MD visit [] Discharge    Electronically signed by: Serenity Lockett, PTA, [id number]    Note: If patient does not return for scheduled/ recommended follow up visits, this note will serve as a discharge from care along with most recent update on progress.

## 2021-10-02 ENCOUNTER — HOSPITAL ENCOUNTER (OUTPATIENT)
Dept: PHYSICAL THERAPY | Age: 56
Setting detail: THERAPIES SERIES
Discharge: HOME | End: 2021-10-02
Payer: COMMERCIAL

## 2021-10-02 PROCEDURE — 97112 NEUROMUSCULAR REEDUCATION: CPT

## 2021-10-02 PROCEDURE — 97110 THERAPEUTIC EXERCISES: CPT

## 2021-10-02 PROCEDURE — 97530 THERAPEUTIC ACTIVITIES: CPT

## 2021-10-02 NOTE — FLOWSHEET NOTE
Therapeutic Exercise (94842)  Resistance / level Sets/sec Reps Notes / Cues   BIKE  3'  Added 9/17   Standing hip flexion off step stretchBridging HEP    SL clams  HEP    SL hip abduction    Prone glute extension/ donkey              Standing Hip flexor stretch  10\" 5 -  R foot back 9/20  10/2 slight modifications   Prone R knee flexion with pillow under abdomen         Total gym Squats B     Ti[employer] unilateral mini squats  Standing Hip Abd Lime loop (laces) 2 10 10/2 - added   Standing Hip Ext Lime loop (ankles) - figure 8 2 10 10/2 - added                 Therapeutic Activities (84209)       Sit to stand Cues for neutral spine at start and end of movement as well as R=L WBing; moved L foot FWD 9/22   Updated HEP today and advised to begin icing and performing STM to anterior incision while increasing compliance to glute muscle groupsLimit trunk rotation, circumduction   Lateral side stepping           Cone taps - 6\" FWD tap ups bilateral L LE  Lateral step-ups 4\" Backwards step off L  4\" TRX Squats (toe out)  1 15 10/2 - added   TRX 3-point excursion  1 6x R in stance 10/2 - added          Neuromuscular Re-ed (55770)       Finding neutral spine         Attempted 9/17 but unable due to pain   Added 9/17   biodex balance npv       Total gym 9/20 9/20 9/20 9/20: Phineas [place] ro avoid pelvic shift/rotations   Standing Standing  Blue   B GH ext  Alt GH ext    9/22: tactile cues neutral    9/22 9/22 March Walking   15 ft x 3 10/2 - added   Hip Hikes  1 15 R 10/2 - added   Lateral Heel Taps 6'' 2 10 R 10/2 - added   Step to SLS: Fwd/Lateral R / Retro airex 10''H 5x ea 10/2 - added          Manual Intervention (53986)       Hip PROM    assessment   Tib/Fem Mobs         9/22   Ankle mobs         9/20              Modalities:     Pt. Education:  -pt educated on diagnosis, prognosis and expectations for rehab  -all pt questions were answered    Home Exercise Program:  Access Code: [email or web address]  URL: [email or web address]  Date: 10/02/2021  Prepared by: Jose Raul Omalley    Exercises  Hooklying Isometric Clamshell - 1 x daily - 7 x weekly - 3 sets - 10 reps  Lateral Step Up - 1 x daily - 7 x weekly - 3 sets - 10 reps  Sit to Stand - 1 x daily - 7 x weekly - 3 sets - 10 reps  Clamshell with Resistance - 1 x daily - 7 x weekly - 3 sets - 10 reps  Sidelying Hip Abduction - 1 x daily - 7 x weekly - 3 sets - 10 reps  Prone Hip Extension - 1 x daily - 7 x weekly - 3 sets - 10 reps  Prone Hip Extension with Bent Knee - 1 x daily - 7 x weekly - 3 sets - 10 reps  Modified Matteo Stretch - 1 x daily - 7 x weekly - 3 sets - 10 reps  Side Stepping with Resistance at Thighs - 1 x daily - 7 x weekly - 3 sets - 10 reps  Lateral Heel Tap - 1 x daily - 4 x weekly - 3 sets - 10 reps  Walking March - 1 x daily - 4 x weekly - 3 sets - 10 reps  Hip Abduction with Resistance Loop - 1 x daily - 4 x weekly - 2 sets - 10 reps  Hip Extension with Resistance Loop - 1 x daily - 4 x weekly - 2 sets - 10 reps      Access Code: [email or web address]  URL: [email or web address]  Date: 09/22/2021  Prepared by: [facility]    Exercises  Hooklying Isometric Hip Abduction with Belt - 3 x daily - 7 x weekly - 10 reps - 5 hold  Supine Hip Adduction Isometric with Ball - 3 x daily - 7 x weekly - 10 reps - 5 hold  Prone Hip Extension on Table - 1 x daily - 7 x weekly - 3 sets - 10 reps  Hooklying Clamshell with Resistance - 1 x daily - 7 x weekly - 3 sets - 10 reps  Hooklying Isometric Clamshell - 1 x daily - 7 x weekly - 3 sets - 10 reps  Standing Terminal Knee Extension with Resistance - 1 x daily - 7 x weekly - 3 sets - 10 reps  Lateral Step Up - 1 x daily - 7 x weekly - 3 sets - 10 reps  Sit to Stand - 1 x daily - 7 x weekly - 3 sets - 10 reps  Standing Heel Raise - 1 x daily - 7 x weekly - 3 sets - 10 reps  Access Code: [email or web address]  URL: [email or web address]  Date: 09/20/2021  Prepared by:  [facility] leiva     Exercises  Hooklying Isometric Hip Abduction with Belt - 3 x daily - 7 x weekly - 10 reps - 5 hold  Supine Hip Adduction Isometric with Ball - 3 x daily - 7 x weekly - 10 reps - 5 hold  Reviewed HEP from hospital including ankle pumps, quad sets, glute sets, heel slides, hip abd slide and LAQ    Therapeutic Exercise and NMR EXR  [x] (40107) Provided verbal/tactile cueing for activities related to strengthening, flexibility, endurance, ROM for improvements in LE, proximal hip, and core control with self care, mobility, lifting, ambulation. [x] (12423) Provided verbal/tactile cueing for activities related to improving balance, coordination, kinesthetic sense, posture, motor skill, proprioception  to assist with LE, proximal hip, and core control in self care, mobility, lifting, ambulation and eccentric single leg control.   [] (56025) Therapist is in constant attendance of 2 or more patients providing skilled therapy interventions, but not providing any significant amount of measurable one-on-one time to either patient, for improvements in LE, proximal hip, and core control in self care, mobility, lifting, ambulation and eccentric single leg control.      NMR and Therapeutic Activities:    [x] (57935 or 16814) Provided verbal/tactile cueing for activities related to improving balance, coordination, kinesthetic sense, posture, motor skill, proprioception and motor activation to allow for proper function of core, proximal hip and LE with self care and ADLs  [] (51817) Gait Re-education- Provided training and instruction to the patient for proper LE, core and proximal hip recruitment and positioning and eccentric body weight control with ambulation re-education including up and down stairs     Home Exercise Program:    [x] (81543) Reviewed/Progressed HEP activities related to strengthening, flexibility, endurance, ROM of core, proximal hip and LE for functional self-care, mobility, lifting and ambulation/stair navigation   [x] (95842)Reviewed/Progressed HEP activities related to improving balance, coordination, kinesthetic sense, posture, motor skill, proprioception of core, proximal hip and LE for self care, mobility, lifting, and ambulation/stair navigation      Manual Treatments:  PROM / STM / Oscillations-Mobs:  G-I, II, III, IV (PA's, Inf., Post.)  [] (62261) Provided manual therapy to mobilize LE, proximal hip and/or LS spine soft tissue/joints for the purpose of modulating pain, promoting relaxation,  increasing ROM, reducing/eliminating soft tissue swelling/inflammation/restriction, improving soft tissue extensibility and allowing for proper ROM for normal function with self care, mobility, lifting and ambulation. Modalities:  [] (57959) Vasopneumatic compression: Utilized vasopneumatic compression to decrease edema / swelling for the purpose of improving mobility and quad tone / recruitment which will allow for increased overall function including but not limited to self-care, transfers, ambulation, and ascending / descending stairs. Charges:  Timed Code Treatment Minutes: 45   Total Treatment Minutes: 45     [] EVAL - LOW (19837)   [] EVAL - MOD (30144)  [] EVAL - HIGH (58812)  [] RE-EVAL (20751)  [x] UO(17742) x 1     [] Ionto  [x] NMR (32423) x  1    [] Vaso  [] Manual (06512) x     [] Ultrasound  [x] TA x  1    [] Mech Traction (87475)  [] Aquatic Therapy x     [] ES (un) (82704):   [] Home Management Training x [] ES(attended) (38929)   [] Group:     [] Other:     GOALS:  Patient stated goal: get walking without walking and back to work and recreational activity ASAP  [] Progressing: [] Met: [] Not Met: [] Adjusted    Therapist goals for Patient:   Short Term Goals: To be achieved in: 2 weeks  1. Independent in HEP and progression per patient tolerance, in order to prevent re-injury. [] Progressing: [] Met: [] Not Met: [] Adjusted  2.  Patient will have a decrease in pain to facilitate improvement in movement, function, and ADLs as indicated by Functional Deficits. [] Progressing: [] Met: [] Not Met: [] Adjusted    Long Term Goals: To be achieved in: 6 weeks  1. Disability index score of 30% or less for the LEFS to assist with reaching prior level of function. [] Progressing: [] Met: [] Not Met: [] Adjusted  2. Patient will demonstrate increased AROM to hip flexion to 110 degrees to allow for proper joint functioning as indicated by patients Functional Deficits. [] Progressing: [] Met: [] Not Met: [] Adjusted  3. Patient will demonstrate an increase in Strength to at least 4+ as well as good proximal hip strength and control to allow for proper functional mobility as indicated by patients Functional Deficits. [] Progressing: [] Met: [] Not Met: [] Adjusted  4. Patient will return to functional activities including walking community distances without AD without increased symptoms or restriction. [] Progressing: [] Met: [] Not Met: [] Adjusted  5. Patient will be able to return to full unrestricted work activity without issues or restrictions. [] Progressing: [] Met: [] Not Met: [] Adjusted     Overall Progression Towards Functional goals/ Treatment Progress Update:  [x] Patient is progressing as expected towards functional goals listed. [] Progression is slowed due to complexities/Impairments listed. [] Progression has been slowed due to co-morbidities.   [] Plan just implemented, too soon to assess goals progression <30days   [] Goals require adjustment due to lack of progress  [] Patient is not progressing as expected and requires additional follow up with physician  [] Other    Persisting Functional Limitations/Impairments:  []Sitting [x]Standing   [x]Walking [x]Stairs   [x]Transfers [x]ADLs   [x]Squatting/bending [x]Kneeling  [x]Housework [x]Job related tasks  [x]Driving [x]Sports/Recreation   [x]Sleeping []Other:    ASSESSMENT:   The patient's gait remains limited by tightness of hip flexors, weakness of extensors, marked by widened SOPHIA, decreased hip extension on RLE and near vaulting to advance LLE. The patient's program was updated to include standing strengthening this date, as well as progressions to activate gluteal and quad musculature. HEP updated with additions for home. Strong need for continued pt program with emphasis on anterior hip flexibility and posterior glute and lateral glute medius muscle strengthening. Skilled therapy needed to progress functional strength and proprioception and gait to PLOF without sxs/restriction. Treatment/Activity Tolerance:  [x] Pt able to complete treatment [] Patient limited by fatique  [] Patient limited by pain  [] Patient limited by other medical complications  [] Other:     Prognosis: [x] Good [] Fair  [] Poor    Patient Requires Follow-up: [x] Yes  [] No        =    PLAN: PT 1-2x / week for 6 weeks. [x] Continue per plan of care [] Alter current plan (see comments)  [] Plan of care initiated [] Hold pending MD visit [] Discharge    Electronically signed by: Nino Adan PT, DPT, OMT-C [id number]    Note: If patient does not return for scheduled/ recommended follow up visits, this note will serve as a discharge from care along with most recent update on progress.

## 2021-10-05 ENCOUNTER — HOSPITAL ENCOUNTER (OUTPATIENT)
Dept: PHYSICAL THERAPY | Age: 56
Setting detail: THERAPIES SERIES
Discharge: HOME | End: 2021-10-05
Payer: COMMERCIAL

## 2021-10-05 PROCEDURE — 97112 NEUROMUSCULAR REEDUCATION: CPT

## 2021-10-05 PROCEDURE — 97110 THERAPEUTIC EXERCISES: CPT

## 2021-10-05 PROCEDURE — 97530 THERAPEUTIC ACTIVITIES: CPT

## 2021-10-05 NOTE — FLOWSHEET NOTE
[facility]  Outpatient Physical Therapy  Phone: [phone]   Fax: [phone]    Physical Therapy Treatment Note/ Progress Report:     Date:  10/5/2021    Patient Name:  Ilsa Khan    :  1965  MRN: [id number]  Restrictions/Precautions:    Medical/Treatment Diagnosis Information:  Diagnosis: M16.11 (ICD-10-CM) - Primary osteoarthritis of right hip S/P 21  Treatment Diagnosis: Decreased R hip ROM, strength, muscle activation, flexibility and pain s/p R PAULETTE with limitations interfering with correct gait mechanics, ADL's, IADL's, recreational and work activity. Insurance/Certification information:  PT Insurance Information: [employer] (Amadorjennykingsley Stephanie needed)  Physician Information:  Referring Practitioner: Kayla Horton MD  Plan of care signed (Y/N): [x]  Yes []  No     Date of Patient follow up with Physician:      Progress Report: []  Yes  [x]  No     Date Range for reporting period:  Beginnin/8  Ending: -    Progress report due (10 Rx/or 30 days whichever is less): visit #10 or  (date)     Recertification due (POC duration/ or 90 days whichever is less): visit #12 or 10/20 (date)     Visit # Insurance Allowable Auth required? Date Range   8 60 [x]  Yes - COHERE  []  No -       Visits approved Visits  used Date Range   12 7  -      Latex Allergy:  [x]NO      []YES  Preferred Language for Healthcare:   [x]English       []other:    Functional Scale:        Date assessed:  LEFS: raw score = 0; dysfunction = 100%       Pain level:  3-4/10     SUBJECTIVE:  Pt feels she is improving each week however, continues to note large discrepancies in strength. OBJECTIVE:    : R innominate downslip, S2IJYQC/FRSL, ROL sacral torsion, L SI posterior innominate, R SI anterior innominate SI    RESTRICTIONS/PRECAUTIONS: No SLR until 10/5.  DOS     Exercises/Interventions:   Therapeutic Exercise (62132)  Resistance / level Sets/sec Reps Notes / Cues   BIKE  3' Added 9/17   Resume next visit single leg           Standing Hip flexor stretch  10\" 5 -  R foot back 9/20  10/2 slight modifications   Standing Hip Abd Lime loop (laces) 2 10 10/2 - added   Standing Hip Ext Lime loop (ankles) - figure 8 2 10 10/2 - added                 Therapeutic Activities (56409)       Limit trunk rotation, circumduction   Lateral side stepping blue 10 steps 3 laps           TRX Squats (toe out)  1 20 10/2 - added   TRX 3-point excursion  1 6x R in stance 10/2 - added          Neuromuscular Re-ed (34946)       biodex balance npv       March Walking 3#  15 ft x 3 10/2 - added   Hip Hikes  1 20 R 10/2 - added   Lateral Heel Taps 6'' 2 10 R 10/2 - added   Step to SLS: Fwd/Lateral R / Retro airex 10''H 5x ea 10/2 - added   Glider lunges  - retro  - lateral    1  1     15  15     Added 10/5  Added 10/5 - cueing for posterior weight shift    Sport cord walking - 4 way npv                    Manual Intervention (35555)       Hip PROM   assessment   Tib/Fem Mobs         9/22   Ankle mobs         9/20              Modalities:     Pt. Education:  -pt educated on diagnosis, prognosis and expectations for rehab  -all pt questions were answered    Home Exercise Program:  Access Code: [email or web address]  URL: [email or web address]  Date: 10/02/2021  Prepared by: Moreno Camejo    Exercises  Hooklying Isometric Clamshell - 1 x daily - 7 x weekly - 3 sets - 10 reps  Lateral Step Up - 1 x daily - 7 x weekly - 3 sets - 10 reps  Sit to Stand - 1 x daily - 7 x weekly - 3 sets - 10 reps  Clamshell with Resistance - 1 x daily - 7 x weekly - 3 sets - 10 reps  Sidelying Hip Abduction - 1 x daily - 7 x weekly - 3 sets - 10 reps  Prone Hip Extension - 1 x daily - 7 x weekly - 3 sets - 10 reps  Prone Hip Extension with Bent Knee - 1 x daily - 7 x weekly - 3 sets - 10 reps  Modified Matteo Stretch - 1 x daily - 7 x weekly - 3 sets - 10 reps  Side Stepping with Resistance at Thighs - 1 x daily - 7 x weekly - 3 sets - 10 reps  Lateral Heel Tap - 1 x daily - 4 x weekly - 3 sets - 10 reps  Walking March - 1 x daily - 4 x weekly - 3 sets - 10 reps  Hip Abduction with Resistance Loop - 1 x daily - 4 x weekly - 2 sets - 10 reps  Hip Extension with Resistance Loop - 1 x daily - 4 x weekly - 2 sets - 10 reps      Access Code: [email or web address]  URL: [email or web address]  Date: 09/22/2021  Prepared by: [facility]    Exercises  Hooklying Isometric Hip Abduction with Belt - 3 x daily - 7 x weekly - 10 reps - 5 hold  Supine Hip Adduction Isometric with Ball - 3 x daily - 7 x weekly - 10 reps - 5 hold  Prone Hip Extension on Table - 1 x daily - 7 x weekly - 3 sets - 10 reps  Hooklying Clamshell with Resistance - 1 x daily - 7 x weekly - 3 sets - 10 reps  Hooklying Isometric Clamshell - 1 x daily - 7 x weekly - 3 sets - 10 reps  Standing Terminal Knee Extension with Resistance - 1 x daily - 7 x weekly - 3 sets - 10 reps  Lateral Step Up - 1 x daily - 7 x weekly - 3 sets - 10 reps  Sit to Stand - 1 x daily - 7 x weekly - 3 sets - 10 reps  Standing Heel Raise - 1 x daily - 7 x weekly - 3 sets - 10 reps  Access Code: [email or web address]  URL: [email or web address]  Date: 09/20/2021  Prepared by: [facility]  o     Exercises  Hooklying Isometric Hip Abduction with Belt - 3 x daily - 7 x weekly - 10 reps - 5 hold  Supine Hip Adduction Isometric with Ball - 3 x daily - 7 x weekly - 10 reps - 5 hold  Reviewed HEP from hospital including ankle pumps, quad sets, glute sets, heel slides, hip abd slide and LAQ    Therapeutic Exercise and NMR EXR  [x] (30767) Provided verbal/tactile cueing for activities related to strengthening, flexibility, endurance, ROM for improvements in LE, proximal hip, and core control with self care, mobility, lifting, ambulation.   [x] (46054) Provided verbal/tactile cueing for activities related to improving balance, coordination, kinesthetic sense, posture, motor skill, proprioception  to assist with LE, proximal hip, and core control in self care, mobility, lifting, ambulation and eccentric single leg control.   [] (56630) Therapist is in constant attendance of 2 or more patients providing skilled therapy interventions, but not providing any significant amount of measurable one-on-one time to either patient, for improvements in LE, proximal hip, and core control in self care, mobility, lifting, ambulation and eccentric single leg control. NMR and Therapeutic Activities:    [x] (26279 or 25525) Provided verbal/tactile cueing for activities related to improving balance, coordination, kinesthetic sense, posture, motor skill, proprioception and motor activation to allow for proper function of core, proximal hip and LE with self care and ADLs  [] (70404) Gait Re-education- Provided training and instruction to the patient for proper LE, core and proximal hip recruitment and positioning and eccentric body weight control with ambulation re-education including up and down stairs     Home Exercise Program:    [x] (31705) Reviewed/Progressed HEP activities related to strengthening, flexibility, endurance, ROM of core, proximal hip and LE for functional self-care, mobility, lifting and ambulation/stair navigation   [x] (71537)Reviewed/Progressed HEP activities related to improving balance, coordination, kinesthetic sense, posture, motor skill, proprioception of core, proximal hip and LE for self care, mobility, lifting, and ambulation/stair navigation      Manual Treatments:  PROM / STM / Oscillations-Mobs:  G-I, II, III, IV (PA's, Inf., Post.)  [] (07005) Provided manual therapy to mobilize LE, proximal hip and/or LS spine soft tissue/joints for the purpose of modulating pain, promoting relaxation,  increasing ROM, reducing/eliminating soft tissue swelling/inflammation/restriction, improving soft tissue extensibility and allowing for proper ROM for normal function with self care, mobility, lifting and ambulation.      Modalities:  [] (07222) Vasopneumatic compression: Utilized vasopneumatic compression to decrease edema / swelling for the purpose of improving mobility and quad tone / recruitment which will allow for increased overall function including but not limited to self-care, transfers, ambulation, and ascending / descending stairs. Charges:  Timed Code Treatment Minutes: 45   Total Treatment Minutes: 45     [] EVAL - LOW (41274)   [] EVAL - MOD (78761)  [] EVAL - HIGH (61721)  [] RE-EVAL (22979)  [x] NL(43216) x 1     [] Ionto  [x] NMR (31698) x  1    [] Vaso  [] Manual (08759) x      [] Ultrasound  [x] TA x  1    [] Mech Traction (60720)  [] Aquatic Therapy x     [] ES (un) (38196):   [] Home Management Training x [] ES(attended) (22911)   [] Group:     [] Other:     GOALS:  Patient stated goal: get walking without walking and back to work and recreational activity ASAP  [] Progressing: [] Met: [] Not Met: [] Adjusted    Therapist goals for Patient:   Short Term Goals: To be achieved in: 2 weeks  1. Independent in HEP and progression per patient tolerance, in order to prevent re-injury. [] Progressing: [] Met: [] Not Met: [] Adjusted  2. Patient will have a decrease in pain to facilitate improvement in movement, function, and ADLs as indicated by Functional Deficits. [] Progressing: [] Met: [] Not Met: [] Adjusted    Long Term Goals: To be achieved in: 6 weeks  1. Disability index score of 30% or less for the LEFS to assist with reaching prior level of function. [] Progressing: [] Met: [] Not Met: [] Adjusted  2. Patient will demonstrate increased AROM to hip flexion to 110 degrees to allow for proper joint functioning as indicated by patients Functional Deficits. [] Progressing: [] Met: [] Not Met: [] Adjusted  3. Patient will demonstrate an increase in Strength to at least 4+ as well as good proximal hip strength and control to allow for proper functional mobility as indicated by patients Functional Deficits.    [] Progressing: [] Met: [] Not Met: [] Adjusted  4. Patient will return to functional activities including walking community distances without AD without increased symptoms or restriction. [] Progressing: [] Met: [] Not Met: [] Adjusted  5. Patient will be able to return to full unrestricted work activity without issues or restrictions. [] Progressing: [] Met: [] Not Met: [] Adjusted     Overall Progression Towards Functional goals/ Treatment Progress Update:  [x] Patient is progressing as expected towards functional goals listed. [] Progression is slowed due to complexities/Impairments listed. [] Progression has been slowed due to co-morbidities. [] Plan just implemented, too soon to assess goals progression <30days   [] Goals require adjustment due to lack of progress  [] Patient is not progressing as expected and requires additional follow up with physician  [] Other    Persisting Functional Limitations/Impairments:  []Sitting [x]Standing   [x]Walking [x]Stairs   [x]Transfers [x]ADLs   [x]Squatting/bending [x]Kneeling  [x]Housework [x]Job related tasks  [x]Driving [x]Sports/Recreation   [x]Sleeping []Other:    ASSESSMENT:  Pt continues to demonstrate significant glute medius weakness as noted with moderate Trendelenburg gait. Pt challenged with TRX exercises this date without significant difficulty performing other exercises this date. Pt required cueing for lunges, squats and lateral step downs for posterior weight shift and increased gluteal activation for full hip extension. Pt continues to demonstrate gait deficits and functional mobility deficits due to weakness and endurance deficits to perform full functional daily tasks without restrictions. Continue skilled therapy to address deficits, improve gait mechanics and tolerance to daily tasks for improved transfers, stairs, weight bearing tolerance and to return to full functional tasks without restrictions.         Treatment/Activity Tolerance:  [x] Pt able to complete treatment [] Patient limited by fatique  [] Patient limited by pain  [] Patient limited by other medical complications  [] Other:     Prognosis: [x] Good [] Fair  [] Poor    Patient Requires Follow-up: [x] Yes  [] No    PLAN: PT 1-2x / week for 6 weeks. [x] Continue per plan of care [] Alter current plan (see comments)  [] Plan of care initiated [] Hold pending MD visit [] Discharge    Electronically signed by: Emmy Whitman, PTA [id number]    Note: If patient does not return for scheduled/ recommended follow up visits, this note will serve as a discharge from care along with most recent update on progress.

## 2021-10-07 ENCOUNTER — HOSPITAL ENCOUNTER (OUTPATIENT)
Dept: PHYSICAL THERAPY | Age: 56
Setting detail: THERAPIES SERIES
Discharge: HOME | End: 2021-10-07
Payer: COMMERCIAL

## 2021-10-07 PROCEDURE — 97110 THERAPEUTIC EXERCISES: CPT

## 2021-10-07 PROCEDURE — 97112 NEUROMUSCULAR REEDUCATION: CPT

## 2021-10-07 NOTE — FLOWSHEET NOTE
[facility]  Phone: [phone]   Fax: [phone]    Physical Therapy Treatment Note/ Progress Report:     Date:  10/7/2021    Patient Name:  Nayana Shelton    :  1965  MRN: [id number]  Restrictions/Precautions:    Medical/Treatment Diagnosis Information:   Diagnosis: M16.11 (ICD-10-CM) - Primary osteoarthritis of right hip S/P 21   Treatment Diagnosis: Decreased R hip ROM, strength, muscle activation, flexibility and pain s/p R PAULETTE with limitations interfering with correct gait mechanics, ADL's, IADL's, recreational and work activity. Insurance/Certification information:  PT Insurance Information: [employer] (Loreatha Poplin needed)  Physician Information:  Referring Practitioner: Amado Quesada MD  Plan of care signed (Y/N): [x]  Yes []  No     Date of Patient follow up with Physician:      Progress Report: []  Yes  [x]  No     Date Range for reporting period:  Beginnin/8  Ending: -    Progress report due (10 Rx/or 30 days whichever is less): visit #10 or  (date)     Recertification due (POC duration/ or 90 days whichever is less): visit # or 10/20 (date)     Visit # Insurance Allowable Auth required? Date Range    60 [x]  Yes - COHERE  []  No -       Visits approved Visits  used Date Range   12 8  -      Latex Allergy:  [x]NO      []YES  Preferred Language for Healthcare:   [x]English       []other:    Functional Scale:        Date assessed:  LEFS: raw score = 0; dysfunction = 100%       Pain level:  3-4/10     SUBJECTIVE:  Pt feels she is stiff today. Not getting much out of stretches. Still feeling weak in R glutes. OBJECTIVE:    10/7 - wide SOPHIA with gait, when observed, the patient narrows SOPHIA and equalizes stride length  : R innominate downslip, D1HWQAP/FRSL, ROL sacral torsion, L SI posterior innominate, R SI anterior innominate SI    RESTRICTIONS/PRECAUTIONS: No SLR until 10/5.  DOS 9/7    Exercises/Interventions:   Therapeutic Exercise (39604)  Resistance / level Sets/sec Reps Notes / Cues   BIKE  Resume next visit single leg Single Leg Leg Press  Standing Hip flexor stretch  Standing Hip Abd Standing Hip Ext SLR Abd 3# 2 20 10/7   Standing Quad  15'' 3 R 10/7   1/2 Kneeling HF Stretch airex 15'' 3 R 10/7   EOT HSS  15'' 3 R  10/7   SLR Flexion  1  1 5  3   10/7   SLR Ext 3# 1 10 10/7 - ^ weight   SLR Hip Ext w/ Knee Bent  3# 1 10 10/7 - ^ weightm   Qped SLR Ext 3# 1 10 10/7    Qped Glute Kick 3# 1 10 10/7          Therapeutic Activities (12752)       Limit trunk rotation, circumduction   Lateral side stepping           TRX Squats (toe out)  10/2 - added   TRX 3-point excursion  10/2 - added          Neuromuscular Re-ed (75742)       biodex balance:    Limits of stability  Random Contro     L11   L11      1x  2 min     10/7 - added   March Walking 10/2 - added   Hip Hikes 10/2 - added   Lateral Heel Taps 10/2 - added   Step to SLS: Fwd/Lateral R / Retro 10/2 - added   Glider lunges  - retro  - lateral   Added 10/5  Added 10/5 - cueing for posterior weight shift    Sport cord walking - 4 way npv      Bridge w/ LLE LAQ  5'' 10 10/7   Landmine Bar Deadlift 45# 1 10 R 10/7   KB Deadlift 10# KB 1 10 R 10/7          Manual Intervention (37598)       Hip PROM   assessment   Tib/Fem Mobs         9/22   Ankle mobs         9/20            Modalities:     Pt. Education:  -pt educated on diagnosis, prognosis and expectations for rehab  -all pt questions were answered    Home Exercise Program:  Access Code: [email or web address]  URL: [email or web address]  Date: 10/07/2021  Prepared by: Dion Hurtado    Exercises  Lateral Step Up - 1 x daily - 7 x weekly - 3 sets - 10 reps  Clamshell with Resistance - 1 x daily - 7 x weekly - 3 sets - 10 reps  Sidelying Hip Abduction - 1 x daily - 7 x weekly - 3 sets - 10 reps  Prone Hip Extension - 1 x daily - 7 x weekly - 3 sets - 10 reps  Prone Hip Extension with Bent Knee - 1 x daily - 7 x weekly - 3 sets - 10 reps  Side Stepping with Resistance at Thighs - 1 x daily - 7 x weekly - 3 sets - 10 reps  Lateral Heel Tap - 1 x daily - 4 x weekly - 3 sets - 10 reps  Walking March - 1 x daily - 4 x weekly - 3 sets - 10 reps  Hip Abduction with Resistance Loop - 1 x daily - 4 x weekly - 2 sets - 10 reps  Hip Extension with Resistance Loop - 1 x daily - 4 x weekly - 2 sets - 10 reps  Prone Terminal Knee Extension - 1 x daily - 4 x weekly - 1 sets - 15 reps - 5 hold  Supine Active Straight Leg Raise - 1 x daily - 4 x weekly - 2 sets - 10 reps  Quadruped Fire Hydrant - 1 x daily - 4 x weekly - 2 sets - 10 reps  Beginner Front Arm Support - 1 x daily - 4 x weekly - 2 sets - 10 reps  Quadruped Hip Extension Kicks - 1 x daily - 4 x weekly - 2 sets - 10 reps    Access Code: [email or web address]  URL: [email or web address]  Date: 10/02/2021  Prepared by: Cary Herrera    Exercises  Hooklying Isometric Clamshell - 1 x daily - 7 x weekly - 3 sets - 10 reps  Lateral Step Up - 1 x daily - 7 x weekly - 3 sets - 10 reps  Sit to Stand - 1 x daily - 7 x weekly - 3 sets - 10 reps  Clamshell with Resistance - 1 x daily - 7 x weekly - 3 sets - 10 reps  Sidelying Hip Abduction - 1 x daily - 7 x weekly - 3 sets - 10 reps  Prone Hip Extension - 1 x daily - 7 x weekly - 3 sets - 10 reps  Prone Hip Extension with Bent Knee - 1 x daily - 7 x weekly - 3 sets - 10 reps  Modified Matteo Stretch - 1 x daily - 7 x weekly - 3 sets - 10 reps  Side Stepping with Resistance at Thighs - 1 x daily - 7 x weekly - 3 sets - 10 reps  Lateral Heel Tap - 1 x daily - 4 x weekly - 3 sets - 10 reps  Walking March - 1 x daily - 4 x weekly - 3 sets - 10 reps  Hip Abduction with Resistance Loop - 1 x daily - 4 x weekly - 2 sets - 10 reps  Hip Extension with Resistance Loop - 1 x daily - 4 x weekly - 2 sets - 10 reps      Access Code: [email or web address]  URL: [email or web address]  Date: 09/22/2021  Prep[facility] by:  [facility] Isometric Hip Abduction with Belt - 3 x daily - 7 x weekly - 10 reps - 5 hold  Supine Hip Adduction Isometric with Ball - 3 x daily - 7 x weekly - 10 reps - 5 hold  Prone Hip Extension on Table - 1 x daily - 7 x weekly - 3 sets - 10 reps  Hooklying Clamshell with Resistance - 1 x daily - 7 x weekly - 3 sets - 10 reps  Hooklying Isometric Clamshell - 1 x daily - 7 x weekly - 3 sets - 10 reps  Standing Terminal Knee Extension with Resistance - 1 x daily - 7 x weekly - 3 sets - 10 reps  Lateral Step Up - 1 x daily - 7 x weekly - 3 sets - 10 reps  Sit to Stand - 1 x daily - 7 x weekly - 3 sets - 10 reps  Standing Heel Raise - 1 x daily - 7 x weekly - 3 sets - 10 reps  Access Code: [email or web address]  URL: [email or web address]  Date: 09/20/2021  Prepared by: [facility]    Exercises  Hooklying Isometric Hip Abduction with Belt - 3 x daily - 7 x weekly - 10 reps - 5 hold  Supine Hip Adduction Isometric with Ball - 3 x daily - 7 x weekly - 10 reps - 5 hold  Reviewed HEP from hospital including ankle pumps, quad sets, glute sets, heel slides, hip abd slide and LAQ    Therapeutic Exercise and NMR EXR  [x] (93456) Provided verbal/tactile cueing for activities related to strengthening, flexibility, endurance, ROM for improvements in LE, proximal hip, and core control with self care, mobility, lifting, ambulation. [x] (03878) Provided verbal/tactile cueing for activities related to improving balance, coordination, kinesthetic sense, posture, motor skill, proprioception  to assist with LE, proximal hip, and core control in self care, mobility, lifting, ambulation and eccentric single leg control.   [] (13316) Therapist is in constant attendance of 2 or more patients providing skilled therapy interventions, but not providing any significant amount of measurable one-on-one time to either patient, for improvements in LE, proximal hip, and core control in self care, mobility, lifting, ambulation and eccentric single leg control. NMR and Therapeutic Activities:    [x] (69379 or 07094) Provided verbal/tactile cueing for activities related to improving balance, coordination, kinesthetic sense, posture, motor skill, proprioception and motor activation to allow for proper function of core, proximal hip and LE with self care and ADLs  [] (49998) Gait Re-education- Provided training and instruction to the patient for proper LE, core and proximal hip recruitment and positioning and eccentric body weight control with ambulation re-education including up and down stairs     Home Exercise Program:    [x] (39304) Reviewed/Progressed HEP activities related to strengthening, flexibility, endurance, ROM of core, proximal hip and LE for functional self-care, mobility, lifting and ambulation/stair navigation   [x] (32351)Reviewed/Progressed HEP activities related to improving balance, coordination, kinesthetic sense, posture, motor skill, proprioception of core, proximal hip and LE for self care, mobility, lifting, and ambulation/stair navigation      Manual Treatments:  PROM / STM / Oscillations-Mobs:  G-I, II, III, IV (PA's, Inf., Post.)  [] (00908) Provided manual therapy to mobilize LE, proximal hip and/or LS spine soft tissue/joints for the purpose of modulating pain, promoting relaxation,  increasing ROM, reducing/eliminating soft tissue swelling/inflammation/restriction, improving soft tissue extensibility and allowing for proper ROM for normal function with self care, mobility, lifting and ambulation. Modalities:  [] (11560) Vasopneumatic compression: Utilized vasopneumatic compression to decrease edema / swelling for the purpose of improving mobility and quad tone / recruitment which will allow for increased overall function including but not limited to self-care, transfers, ambulation, and ascending / descending stairs.        Charges:  Timed Code Treatment Minutes: 47   Total Treatment Minutes: 47     [] EVAL - LOW (05846)   [] EVAL - MOD (43338)  [] EVAL - HIGH (38923)  [] RE-EVAL (55077)  [x] CQ(19025) x 2     [] Ionto  [x] NMR (46791) x  1    [] Vaso  [] Manual (57264) x      [] Ultrasound  [] TA x      [] Mech Traction (36193)  [] Aquatic Therapy x     [] ES (un) (36412):   [] Home Management Training x [] ES(attended) (20740)   [] Group:     [] Other:     GOALS:  Patient stated goal: get walking without walking and back to work and recreational activity ASAP  [] Progressing: [] Met: [] Not Met: [] Adjusted    Therapist goals for Patient:   Short Term Goals: To be achieved in: 2 weeks  1. Independent in HEP and progression per patient tolerance, in order to prevent re-injury. [] Progressing: [] Met: [] Not Met: [] Adjusted  2. Patient will have a decrease in pain to facilitate improvement in movement, function, and ADLs as indicated by Functional Deficits. [] Progressing: [] Met: [] Not Met: [] Adjusted    Long Term Goals: To be achieved in: 6 weeks  1. Disability index score of 30% or less for the LEFS to assist with reaching prior level of function. [] Progressing: [] Met: [] Not Met: [] Adjusted  2. Patient will demonstrate increased AROM to hip flexion to 110 degrees to allow for proper joint functioning as indicated by patients Functional Deficits. [] Progressing: [] Met: [] Not Met: [] Adjusted  3. Patient will demonstrate an increase in Strength to at least 4+ as well as good proximal hip strength and control to allow for proper functional mobility as indicated by patients Functional Deficits. [] Progressing: [] Met: [] Not Met: [] Adjusted  4. Patient will return to functional activities including walking community distances without AD without increased symptoms or restriction. [] Progressing: [] Met: [] Not Met: [] Adjusted  5. Patient will be able to return to full unrestricted work activity without issues or restrictions.      [] Progressing: [] Met: [] Not Met: [] Adjusted     Overall Progression Towards Functional goals/ Treatment Progress Update:  [x] Patient is progressing as expected towards functional goals listed. [] Progression is slowed due to complexities/Impairments listed. [] Progression has been slowed due to co-morbidities. [] Plan just implemented, too soon to assess goals progression <30days   [] Goals require adjustment due to lack of progress  [] Patient is not progressing as expected and requires additional follow up with physician  [] Other    Persisting Functional Limitations/Impairments:  []Sitting [x]Standing   [x]Walking [x]Stairs   [x]Transfers [x]ADLs   [x]Squatting/bending [x]Kneeling  [x]Housework [x]Job related tasks  [x]Driving [x]Sports/Recreation   [x]Sleeping []Other:    ASSESSMENT:  The patient had difficulty with SLR flexion, marked by quad lag, and trouble initiating lift. The patient was challenged by glute activities and exercises this date, mainly bridges and deadlifts. She notes that the lack of hip proprioception at this time limits her ability to feel strong activation of gluteals and hip musculature. She continues to demonstrate considerable gait deficits and functional mobility deficits due to weakness and endurance deficits. The patient required T.C. and v.c. to correct pelvic/trunk rotation to account for lack of hip extension. Treatment/Activity Tolerance:  [x] Pt able to complete treatment [] Patient limited by fatique  [] Patient limited by pain  [] Patient limited by other medical complications  [] Other:     Prognosis: [x] Good [] Fair  [] Poor    Patient Requires Follow-up: [x] Yes  [] No    PLAN: PT 1-2x / week for 6 weeks.    [x] Continue per plan of care [] Alter current plan (see comments)  [] Plan of care initiated [] Hold pending MD visit [] Discharge    Electronically signed by: Yong Guzman, PT, DPT, OMT-C    Note: If patient does not return for scheduled/ recommended follow up visits, this note will serve as a discharge from care along with most recent update on progress.

## 2021-10-11 ENCOUNTER — HOSPITAL ENCOUNTER (OUTPATIENT)
Dept: PHYSICAL THERAPY | Age: 56
Setting detail: THERAPIES SERIES
Discharge: HOME | End: 2021-10-11
Payer: COMMERCIAL

## 2021-10-11 PROCEDURE — 97110 THERAPEUTIC EXERCISES: CPT

## 2021-10-11 PROCEDURE — 97112 NEUROMUSCULAR REEDUCATION: CPT

## 2021-10-11 NOTE — PROGRESS NOTES
[facility]  Phone: [phone]   Fax: [phone]    Physical Therapy Treatment Note/ Progress Report:     Date:  10/11/2021    Patient Name:  Imelda Johansen    :  1965  MRN: [id number]  Restrictions/Precautions:    Medical/Treatment Diagnosis Information:   Diagnosis: M16.11 (ICD-10-CM) - Primary osteoarthritis of right hip S/P 21   Treatment Diagnosis: Decreased R hip ROM, strength, muscle activation, flexibility and pain s/p R PAULETTE with limitations interfering with correct gait mechanics, ADL's, IADL's, recreational and work activity. Insurance/Certification information:  PT Insurance Information: [employer] (Cecil Ace needed)  Physician Information:  Referring Practitioner: Lidia Baugh MD  Plan of care signed (Y/N): [x]  Yes []  No     Date of Patient follow up with Physician:      Progress Report: [x]  Yes PN  []  No     Date Range for reporting period:  Beginnin/8  PN: 10/11  Ending: -    Progress report due (10 Rx/or 30 days whichever is less): visit #69  Recertification due (POC duration/ or 90 days whichever is less): visit #12 or 10/20 (date)     Visit # Insurance Allowable Auth required? Date Range   10 60 [x]  Yes - COHERE  []  No -       Visits approved Visits  used Date Range   12 8  -      Latex Allergy:  [x]NO      []YES  Preferred Language for Healthcare:   [x]English       []other:    Functional Scale:        Date assessed:  LEFS: raw score = 0; dysfunction = 100%     LEFS: raw score = 42; dysfunction = 47.5%  10/11  Pain level:  3-4/10     SUBJECTIVE:  Pt states she felt sore after last PT session but has also been working very hard. Still feeling weak in R glutes. She states she is about to go \"on call\" which will increase her worked hours more. Umu Mckeon concerned about leg length. Discussed ordering a small adjustable heel lift and week to week decreasing size.       OBJECTIVE:    10/11: Strength: QL L 4-/5 R 4/5; gluteus medius L 4+/5, R 4/5; SLS 15 on Airex R; challneged with neutral spine with deadlifts on R and actication of L QL with gait  10/7 - wide SOPHIA with gait, when observed, the patient narrows SOPHIA and equalizes stride length  9/20: R innominate downslip, [id number]/FRSL, ROL sacral torsion, L SI posterior innominate, R SI anterior innominate SI    RESTRICTIONS/PRECAUTIONS: No SLR until 10/5.  DOS 9/7    Exercises/Interventions:   Therapeutic Exercise (62992)  Resistance / level Sets/sec Reps Notes / Cues   BIKE  Resume next visit single leg Single Leg Leg Press  Standing Hip flexor stretch  Standing Hip Abd Standing Hip Ext SLR Abd 3# 2 20 10/7   Standing Quad  15'' 3 R 10/7   1/2 Kneeling HF Stretch airex 15'' 3 R 10/7   EOT HSS  15'' 3 R  10/7   SLR Flexion  1  1 5  3   10/7   SLR Ext 3# 1 10 10/7 - ^ weight   SLR Hip Ext w/ Knee Bent  3# 1 10 10/7 - ^ weightm   Qped SLR Ext 3# 1 10 10/7    Qped Glute Kick 3# 1 10 10/7   Total Gym B LE squat  R Single leg 2  2 10  10 10/11   Therapeutic Activities (57010)       Limit trunk rotation, circumduction   Lateral side stepping           TRX Squats (toe out)  10/2 - added   TRX 3-point excursion  10/2 - added          Neuromuscular Re-ed (42082)       biodex balance:Resume Next pt. vsit    Limits of stability  Random Contro     L11   L11      1x  2 min     10/7 - added   March Walking 10/2 - added   Hip Hikes 10/2 - added   Lateral Heel Taps 10/2 - added   Step to SLS: Fwd/Lateral R / Retro 10/2 - added   Glider lunges  - retro  - lateral   Added 10/5  Added 10/5 - cueing for posterior weight shift    Sport cord walking - 4 way  1 6 10/11   Bridge w/ LLE LAQ  5'' 10 10/7   Landmine Bar Deadlift Resume Next pt. vsit 45# 1 10 R 10/7   KB Deadlift 10# KB 1 10 R/L 10/7   Rocker Board  A/P and M/L  Rocks  Dynamic (knees bent)   1  1/10\"   10 each  8 10/7   Rebounder Ball Toss Airex  Tandem L/R  R SLS   2   10 10/11   8\" step-ups with R LE with L LE tap on 6\" step (on side) 0#  Purple band 1  1 10  \10 10/11   Bosu Fwd Step up to SLS R LE  Lateral step-ups R 2    1 10    10 10/11   Manual Intervention (66960)       Hip PROM   assessment   Tib/Fem Mobs         9/22   Ankle mobs         9/20            Modalities:     Pt. Education:  -pt educated on diagnosis, prognosis and expectations for rehab  -all pt questions were answered    Home Exercise Program:  Access Code: [email or web address]  URL: [employer]/  Date: 10/11/2021  Prepared by: [facility]    Exercises    Staggered Stance Gluteal Strengthening - 1 x daily - 7 x weekly - 3 sets - 10 reps  Hip Hiking on Step - 1 x daily - 7 x weekly - 3 sets - 10 reps  Access Code: [email or web address]  URL: [employer]/  Date: 10/07/2021  Prepared by: Myriam Peabody    Exercises  Lateral Step Up - 1 x daily - 7 x weekly - 3 sets - 10 reps  Clamshell with Resistance - 1 x daily - 7 x weekly - 3 sets - 10 reps  Sidelying Hip Abduction - 1 x daily - 7 x weekly - 3 sets - 10 reps  Prone Hip Extension - 1 x daily - 7 x weekly - 3 sets - 10 reps  Prone Hip Extension with Bent Knee - 1 x daily - 7 x weekly - 3 sets - 10 reps  Side Stepping with Resistance at Thighs - 1 x daily - 7 x weekly - 3 sets - 10 reps  Lateral Heel Tap - 1 x daily - 4 x weekly - 3 sets - 10 reps  Walking March - 1 x daily - 4 x weekly - 3 sets - 10 reps  Hip Abduction with Resistance Loop - 1 x daily - 4 x weekly - 2 sets - 10 reps  Hip Extension with Resistance Loop - 1 x daily - 4 x weekly - 2 sets - 10 reps  Prone Terminal Knee Extension - 1 x daily - 4 x weekly - 1 sets - 15 reps - 5 hold  Supine Active Straight Leg Raise - 1 x daily - 4 x weekly - 2 sets - 10 reps  Quadruped Fire Hydrant - 1 x daily - 4 x weekly - 2 sets - 10 reps  Beginner Front Arm Support - 1 x daily - 4 x weekly - 2 sets - 10 reps  Quadruped Hip Extension Kicks - 1 x daily - 4 x weekly - 2 sets - 10 reps    Access Code: [email or web address]  URL: [email or web address]  Date: 10/02/2021  Prepared by: Jose Raul Omalley    Exercises  Hooklying Isometric Clamshell - 1 x daily - 7 x weekly - 3 sets - 10 reps  Lateral Step Up - 1 x daily - 7 x weekly - 3 sets - 10 reps  Sit to Stand - 1 x daily - 7 x weekly - 3 sets - 10 reps  Clamshell with Resistance - 1 x daily - 7 x weekly - 3 sets - 10 reps  Sidelying Hip Abduction - 1 x daily - 7 x weekly - 3 sets - 10 reps  Prone Hip Extension - 1 x daily - 7 x weekly - 3 sets - 10 reps  Prone Hip Extension with Bent Knee - 1 x daily - 7 x weekly - 3 sets - 10 reps  Modified Matteo Stretch - 1 x daily - 7 x weekly - 3 sets - 10 reps  Side Stepping with Resistance at Thighs - 1 x daily - 7 x weekly - 3 sets - 10 reps  Lateral Heel Tap - 1 x daily - 4 x weekly - 3 sets - 10 reps  Walking March - 1 x daily - 4 x weekly - 3 sets - 10 reps  Hip Abduction with Resistance Loop - 1 x daily - 4 x weekly - 2 sets - 10 reps  Hip Extension with Resistance Loop - 1 x daily - 4 x weekly - 2 sets - 10 reps      Access Code: [email or web address]  URL: [email or web address]  Date: 09/22/2021  Prepared by: [facility]    Exercises  Hooklying Isometric Hip Abduction with Belt - 3 x daily - 7 x weekly - 10 reps - 5 hold  Supine Hip Adduction Isometric with Ball - 3 x daily - 7 x weekly - 10 reps - 5 hold  Prone Hip Extension on Table - 1 x daily - 7 x weekly - 3 sets - 10 reps  Hooklying Clamshell with Resistance - 1 x daily - 7 x weekly - 3 sets - 10 reps  Hooklying Isometric Clamshell - 1 x daily - 7 x weekly - 3 sets - 10 reps  Standing Terminal Knee Extension with Resistance - 1 x daily - 7 x weekly - 3 sets - 10 reps  Lateral Step Up - 1 x daily - 7 x weekly - 3 sets - 10 reps  Sit to Stand - 1 x daily - 7 x weekly - 3 sets - 10 reps  Standing Heel Raise - 1 x daily - 7 x weekly - 3 sets - 10 reps  Access Code: [email or web address]  URL: [employer]/  Date: 09/20/2021  Prepared by:  [facility]    Exercises  Hooklying Isometric Hip Abduction with Belt - 3 x daily - 7 x weekly - 10 reps - 5 kinesthetic sense, posture, motor skill, proprioception of core, proximal hip and LE for self care, mobility, lifting, and ambulation/stair navigation      Manual Treatments:  PROM / STM / Oscillations-Mobs:  G-I, II, III, IV (PA's, Inf., Post.)  [] (84218) Provided manual therapy to mobilize LE, proximal hip and/or LS spine soft tissue/joints for the purpose of modulating pain, promoting relaxation,  increasing ROM, reducing/eliminating soft tissue swelling/inflammation/restriction, improving soft tissue extensibility and allowing for proper ROM for normal function with self care, mobility, lifting and ambulation. Modalities:  [] (07162) Vasopneumatic compression: Utilized vasopneumatic compression to decrease edema / swelling for the purpose of improving mobility and quad tone / recruitment which will allow for increased overall function including but not limited to self-care, transfers, ambulation, and ascending / descending stairs. Charges:  Timed Code Treatment Minutes: 47   Total Treatment Minutes: 47     [] EVAL - LOW (14645)   [] EVAL - MOD (39060)  [] EVAL - HIGH (94219)  [] RE-EVAL (91806)  [x] CZ(58776) x 1     [] Ionto  [x] NMR (84000) x  2    [] Vaso  [] Manual (76516) x      [] Ultrasound  [] TA x      [] Mech Traction (32159)  [] Aquatic Therapy x     [] ES (un) (13303):   [] Home Management Training x [] ES(attended) (15941)   [] Group:     [] Other:     GOALS:  Patient stated goal: get walking without walking and back to work and recreational activity ASAP  [] Progressing: [] Met: [] Not Met: [] Adjusted    Therapist goals for Patient:   Short Term Goals: To be achieved in: 2 weeks  1. Independent in HEP and progression per patient tolerance, in order to prevent re-injury. [] Progressing: [x] Met: [] Not Met: [] Adjusted  2. Patient will have a decrease in pain to facilitate improvement in movement, function, and ADLs as indicated by Functional Deficits.   [] Progressing: [x] Met: [] Not Met: [] Adjusted    Long Term Goals: To be achieved in: 6 weeks  1. Disability index score of 30% or less for the LEFS to assist with reaching prior level of function. [x] Progressing: [] Met: [] Not Met: [] Adjusted  2. Patient will demonstrate increased AROM to hip flexion to 110 degrees to allow for proper joint functioning as indicated by patients Functional Deficits. [] Progressing: [x] Met: [] Not Met: [] Adjusted  3. Patient will demonstrate an increase in Strength to at least 4+ as well as good proximal hip strength and control to allow for proper functional mobility as indicated by patients Functional Deficits. [x] Progressing: [] Met: [] Not Met: [] Adjusted  4. Patient will return to functional activities including walking community distances without AD without increased symptoms or restriction. [x] Progressing: [] Met: [] Not Met: [] Adjusted  5. Patient will be able to return to full unrestricted work activity without issues or restrictions. [x] Progressing: [] Met: [] Not Met: [] Adjusted     Overall Progression Towards Functional goals/ Treatment Progress Update:  [x] Patient is progressing as expected towards functional goals listed. [] Progression is slowed due to complexities/Impairments listed. [] Progression has been slowed due to co-morbidities. [] Plan just implemented, too soon to assess goals progression <30days   [] Goals require adjustment due to lack of progress  [] Patient is not progressing as expected and requires additional follow up with physician  [] Other    Persisting Functional Limitations/Impairments:  []Sitting [x]Standing   [x]Walking [x]Stairs   [x]Transfers [x]ADLs   [x]Squatting/bending [x]Kneeling  [x]Housework [x]Job related tasks  [x]Driving [x]Sports/Recreation   [x]Sleeping []Other:    ASSESSMENT:  . The patient is still challenged by glute activities and exercises this date, mainly bridges and deadlifts as well as SLR which was initiated last session. She notes that the lack of hip proprioception at this time limits her ability to feel strong activation of gluteals and hip musculature. She was able to add in more dynamic activities this date and after performing a few repetitions, improved. She continues to demonstrate considerable gait deficits and functional mobility deficits due to weakness and endurance deficits and may benefit from an adjustable leg lift, with decreasing heights week by week tas ,uscle length/tension stabilize. Patient did have improved gait after QL exercises/NMR. The patient required T.C. and v.c. to correct pelvic/trunk rotation to account for lack of hip extension. Treatment/Activity Tolerance:  [x] Pt able to complete treatment [] Patient limited by fatique  [] Patient limited by pain  [] Patient limited by other medical complications  [] Other:     Prognosis: [x] Good [] Fair  [] Poor    Patient Requires Follow-up: [x] Yes  [] No    PLAN: PT 1-2x / week for 6 weeks. [x] Continue per plan of care [] Alter current plan (see comments)  [] Plan of care initiated [] Hold pending MD visit [] Discharge    Electronically signed by: Ramon Maciel PT, msPT, OMT-C    Note: If patient does not return for scheduled/ recommended follow up visits, this note will serve as a discharge from care along with most recent update on progress.

## 2021-10-13 ENCOUNTER — HOSPITAL ENCOUNTER (OUTPATIENT)
Dept: PHYSICAL THERAPY | Age: 56
Setting detail: THERAPIES SERIES
Discharge: HOME | End: 2021-10-13
Payer: COMMERCIAL

## 2021-10-13 PROCEDURE — 97112 NEUROMUSCULAR REEDUCATION: CPT

## 2021-10-13 PROCEDURE — 97110 THERAPEUTIC EXERCISES: CPT

## 2021-10-13 NOTE — PROGRESS NOTES
[facility]  Phone: [phone]   Fax: [phone]    Physical Therapy Treatment Note/ Progress Report:     Date:  10/13/2021    Patient Name:  Amelia Razo    :  1965  MRN: [id number]  Restrictions/Precautions:    Medical/Treatment Diagnosis Information:   Diagnosis: M16.11 (ICD-10-CM) - Primary osteoarthritis of right hip S/P 21   Treatment Diagnosis: Decreased R hip ROM, strength, muscle activation, flexibility and pain s/p R PAULETTE with limitations interfering with correct gait mechanics, ADL's, IADL's, recreational and work activity. Insurance/Certification information:  PT Insurance Information: [employer] (Kathy Brow needed)  Physician Information:  Referring Practitioner: Catherine Nick MD  Plan of care signed (Y/N): [x]  Yes []  No     Date of Patient follow up with Physician:      Progress Report: [x]  Yes PN  []  No     Date Range for reporting period:  Beginnin/8  PN: 10/11  Ending: -    Progress report due (10 Rx/or 30 days whichever is less): visit #87  Recertification due (POC duration/ or 90 days whichever is less): visit #12 or 10/20 (date)     Visit # Insurance Allowable Auth required? Date Range    60 [x]  Yes - COHERE  []  No -       Visits approved Visits  used Date Range   12 8  -      Latex Allergy:  [x]NO      []YES  Preferred Language for Healthcare:   [x]English       []other:    Functional Scale:        Date assessed:  LEFS: raw score = 0; dysfunction = 100%     LEFS: raw score = 42; dysfunction = 47.5%  10/11  Pain level:  3-4/10     SUBJECTIVE:   Pt bought ankle weights and has been using them at home with exercises. Continues to have limp and hip weakness.      OBJECTIVE:    10/11: Strength: QL L 4-/5 R 4/5; gluteus medius L 4+/5, R 4/5; SLS 15 on Airex R; challneged with neutral spine with deadlifts on R and actication of L QL with gait  10/7 - wide SOPHIA with gait, when observed, the patient narrows SOPHIA and equalizes stride length  9/20: R innominate downslip, [id number], ROL sacral torsion, L SI posterior innominate, R SI anterior innominate SI    RESTRICTIONS/PRECAUTIONS: No SLR until 10/5. DOS 9/7    Exercises/Interventions:   Therapeutic Exercise (49778)  Resistance / level Sets/sec Reps Notes / Cues   BIKE  SL leg press 65# 3 10 3# 2 20 10/7   Standing Quad  15'' 3 R 10/7   1/2 Kneeling HF Stretch airex 15'' 3 R 10/7   EOT HSS  15'' 3 R  10/7   SLR Flexion  1  1 5  3   10/7   3# 1 10 10/7 - ^ weight   3# 1 10 10/7 - ^ weight   Qped SLR Ext 3# 2 10 10/7   PVC across LB for input   Qped Glute Kick 3# 2 10 10/7  PVC across LB for input    Total Gym B LE squat  R Single leg 2  2 10  10 10/11   Therapeutic Activities (16431)                   10/2 - added    10/2 - added          Neuromuscular Re-ed (58221)            L11   L11      1x  2 min     10/7 - added   Glider lunges  - retro  - lateral   Added 10/5  Added 10/5 - cueing for posterior weight shift    Sport cord walking - 4 way  1 6 10/11   Bridge w/ LLE LAQ  5'' 10 10/7   Landmine Bar Deadlift  45# 2 10 R 10/7   KB Deadlift 10# KB 1 10 R/L 10/7    A/P and M/L  [place]ocks  Dynamic (knees bent)   1  1/10\"   10 each  8 10/7   Airex  Tandem L/R  R SLS   2   10 10/11   0#  Purple band 1  1 10  \10 10/11   Bosu Fwd Step up to SLS R LE  Lateral step-ups R 2    1 10    10 10/11   Manual Intervention (01020)       Hip PROM   assessment   Tib/Fem Mobs         9/22   Ankle mobs         9/20            Modalities:     Pt. Education:  -pt educated on diagnosis, prognosis and expectations for rehab  -all pt questions were answered    Home Exercise Program:  Access Code: [email or web address]  URL: [email or web address]  Date: 10/11/2021  [facility]  [facility]    Exercises    Staggered Stance Gluteal Strengthening - 1 x daily - 7 x weekly - 3 sets - 10 reps  Hip Hiking on Step - 1 x daily - 7 x weekly - 3 sets - 10 reps  Access Code: [email or web address]  URL: [email or web address]  Date: 10/07/2021  Prepared by: Bill Tarango    Exercises  Lateral Step Up - 1 x daily - 7 x weekly - 3 sets - 10 reps  Clamshell with Resistance - 1 x daily - 7 x weekly - 3 sets - 10 reps  Sidelying Hip Abduction - 1 x daily - 7 x weekly - 3 sets - 10 reps  Prone Hip Extension - 1 x daily - 7 x weekly - 3 sets - 10 reps  Prone Hip Extension with Bent Knee - 1 x daily - 7 x weekly - 3 sets - 10 reps  Side Stepping with Resistance at Thighs - 1 x daily - 7 x weekly - 3 sets - 10 reps  Lateral Heel Tap - 1 x daily - 4 x weekly - 3 sets - 10 reps  Walking March - 1 x daily - 4 x weekly - 3 sets - 10 reps  Hip Abduction with Resistance Loop - 1 x daily - 4 x weekly - 2 sets - 10 reps  Hip Extension with Resistance Loop - 1 x daily - 4 x weekly - 2 sets - 10 reps  Prone Terminal Knee Extension - 1 x daily - 4 x weekly - 1 sets - 15 reps - 5 hold  Supine Active Straight Leg Raise - 1 x daily - 4 x weekly - 2 sets - 10 reps  Quadruped Fire Hydrant - 1 x daily - 4 x weekly - 2 sets - 10 reps  Beginner Front Arm Support - 1 x daily - 4 x weekly - 2 sets - 10 reps  Quadruped Hip Extension Kicks - 1 x daily - 4 x weekly - 2 sets - 10 reps    Access Code: [email or web address]  URL: [email or web address]  Date: 10/02/2021  Prepared by: Bill Tarango    Exercises  Hooklying Isometric Clamshell - 1 x daily - 7 x weekly - 3 sets - 10 reps  Lateral Step Up - 1 x daily - 7 x weekly - 3 sets - 10 reps  Sit to Stand - 1 x daily - 7 x weekly - 3 sets - 10 reps  Clamshell with Resistance - 1 x daily - 7 x weekly - 3 sets - 10 reps  Sidelying Hip Abduction - 1 x daily - 7 x weekly - 3 sets - 10 reps  Prone Hip Extension - 1 x daily - 7 x weekly - 3 sets - 10 reps  Prone Hip Extension with Bent Knee - 1 x daily - 7 x weekly - 3 sets - 10 reps  Modified Matteo Stretch - 1 x daily - 7 x weekly - 3 sets - 10 reps  Side Stepping with Resistance at Thighs - 1 x daily - 7 x weekly - 3 sets - 10 reps  Lateral Heel Tap - 1 x daily - 4 x weekly - 3 sets - 10 reps  Walking March - 1 x daily - 4 x weekly - 3 sets - 10 reps  Hip Abduction with Resistance Loop - 1 x daily - 4 x weekly - 2 sets - 10 reps  Hip Extension with Resistance Loop - 1 x daily - 4 x weekly - 2 sets - 10 reps      Access Code: [email or web address]  URL: [email or web address]  Date: 09/22/2021  Prepared by: [facility]    Exercises  Hooklying Isometric Hip Abduction with Belt - 3 x daily - 7 x weekly - 10 reps - 5 hold  Supine Hip Adduction Isometric with Ball - 3 x daily - 7 x weekly - 10 reps - 5 hold  Prone Hip Extension on Table - 1 x daily - 7 x weekly - 3 sets - 10 reps  Hooklying Clamshell with Resistance - 1 x daily - 7 x weekly - 3 sets - 10 reps  Hooklying Isometric Clamshell - 1 x daily - 7 x weekly - 3 sets - 10 reps  Standing Terminal Knee Extension with Resistance - 1 x daily - 7 x weekly - 3 sets - 10 reps  Lateral Step Up - 1 x daily - 7 x weekly - 3 sets - 10 reps  Sit to Stand - 1 x daily - 7 x weekly - 3 sets - 10 reps  Standing Heel Raise - 1 x daily - 7 x weekly - 3 sets - 10 reps  Access Code: [email or web address]  URL: [email or web address]  Date: 09/20/2021  Prepared by: [facility]    Exercises  Hooklying Isometric Hip Abduction with Belt - 3 x daily - 7 x weekly - 10 reps - 5 hold  Supine Hip Adduction Isometric with Ball - 3 x daily - 7 x weekly - 10 reps - 5 hold  Reviewed HEP from hospital including ankle pumps, quad sets, glute sets, heel slides, hip abd slide and LAQ    Therapeutic Exercise and NMR EXR  [x] (99582) Provided verbal/tactile cueing for activities related to strengthening, flexibility, endurance, ROM for improvements in LE, proximal hip, and core control with self care, mobility, lifting, ambulation.   [x] (03632) Provided verbal/tactile cueing for activities related to improving balance, coordination, kinesthetic sense, posture, motor skill, proprioception  to assist with LE, proximal hip, and core control in self care, mobility, lifting, ambulation and eccentric single leg control.   [] (92051) Therapist is in constant attendance of 2 or more patients providing skilled therapy interventions, but not providing any significant amount of measurable one-on-one time to either patient, for improvements in LE, proximal hip, and core control in self care, mobility, lifting, ambulation and eccentric single leg control. NMR and Therapeutic Activities:    [x] (52204 or 22934) Provided verbal/tactile cueing for activities related to improving balance, coordination, kinesthetic sense, posture, motor skill, proprioception and motor activation to allow for proper function of core, proximal hip and LE with self care and ADLs  [] (35165) Gait Re-education- Provided training and instruction to the patient for proper LE, core and proximal hip recruitment and positioning and eccentric body weight control with ambulation re-education including up and down stairs     Home Exercise Program:    [x] (75357) Reviewed/Progressed HEP activities related to strengthening, flexibility, endurance, ROM of core, proximal hip and LE for functional self-care, mobility, lifting and ambulation/stair navigation   [x] (21184)Reviewed/Progressed HEP activities related to improving balance, coordination, kinesthetic sense, posture, motor skill, proprioception of core, proximal hip and LE for self care, mobility, lifting, and ambulation/stair navigation      Manual Treatments:  PROM / STM / Oscillations-Mobs:  G-I, II, III, IV (PA's, Inf., Post.)  [] (78155) Provided manual therapy to mobilize LE, proximal hip and/or LS spine soft tissue/joints for the purpose of modulating pain, promoting relaxation,  increasing ROM, reducing/eliminating soft tissue swelling/inflammation/restriction, improving soft tissue extensibility and allowing for proper ROM for normal function with self care, mobility, lifting and ambulation.      Modalities:  [] (96377) Vasopneumatic compression: Utilized Patient will return to functional activities including walking community distances without AD without increased symptoms or restriction. [x] Progressing: [] Met: [] Not Met: [] Adjusted  5. Patient will be able to return to full unrestricted work activity without issues or restrictions. [x] Progressing: [] Met: [] Not Met: [] Adjusted     Overall Progression Towards Functional goals/ Treatment Progress Update:  [x] Patient is progressing as expected towards functional goals listed. [] Progression is slowed due to complexities/Impairments listed. [] Progression has been slowed due to co-morbidities. [] Plan just implemented, too soon to assess goals progression <30days   [] Goals require adjustment due to lack of progress  [] Patient is not progressing as expected and requires additional follow up with physician  [] Other    Persisting Functional Limitations/Impairments:  []Sitting [x]Standing   [x]Walking [x]Stairs   [x]Transfers [x]ADLs   [x]Squatting/bending [x]Kneeling  [x]Housework [x]Job related tasks  [x]Driving [x]Sports/Recreation   [x]Sleeping []Other:    ASSESSMENT:  Continues to demonstrate limited neuromuscular gluteal contraction with exercises as well as limited awareness of gluteal muscle isolation with exercises. Pt continues to compensate with excessive lumbar flexion as well as hamstring co contraction due to gluteal weakness. Continues to need frequent cueing to slow down with focus on form as pt tends to rush exercises. Limited proprioception noted with multiple episodes of LOB today self corrected with opposite LE or with hand hold assist onto stable object. Continue with skilled therapy to promote improved gluteal isolation and strength for pelvic stability and improved gait mechanics as well as transfers, stairs and functional daily tasks.      Treatment/Activity Tolerance:  [x] Pt able to complete treatment [] Patient limited by fatique  [] Patient limited by pain  [] Patient limited by other medical complications  [] Other:     Prognosis: [x] Good [] Fair  [] Poor    Patient Requires Follow-up: [x] Yes  [] No    PLAN: PT 1-2x / week for 6 weeks. [x] Continue per plan of care [] Alter current plan (see comments)  [] Plan of care initiated [] Hold pending MD visit [] Discharge    Electronically signed by: Sonali Muñoz PTA [id number]    Note: If patient does not return for scheduled/ recommended follow up visits, this note will serve as a discharge from care along with most recent update on progress.

## 2021-10-18 ENCOUNTER — HOSPITAL ENCOUNTER (OUTPATIENT)
Dept: PHYSICAL THERAPY | Age: 56
Setting detail: THERAPIES SERIES
Discharge: HOME | End: 2021-10-18
Payer: COMMERCIAL

## 2021-10-18 PROCEDURE — 97140 MANUAL THERAPY 1/> REGIONS: CPT

## 2021-10-18 PROCEDURE — 97112 NEUROMUSCULAR REEDUCATION: CPT

## 2021-10-18 NOTE — PROGRESS NOTES
[facility]  Phone: [phone]   Fax: [phone]    Physical Therapy Treatment Note/ Progress Report:     Date:  10/18/2021    Patient Name:  Dave Park    :  1965  MRN: [id number]  Restrictions/Precautions:    Medical/Treatment Diagnosis Information:   Diagnosis: M16.11 (ICD-10-CM) - Primary osteoarthritis of right hip S/P 21   Treatment Diagnosis: Decreased R hip ROM, strength, muscle activation, flexibility and pain s/p R PAULETTE with limitations interfering with correct gait mechanics, ADL's, IADL's, recreational and work activity. Insurance/Certification information:  PT Insurance Information: [employer] (Greg Alvarez needed)  Physician Information:  Referring Practitioner: Radha Valdez MD  Plan of care signed (Y/N): [x]  Yes []  No     Date of Patient follow up with Physician:      Progress Report: []  Yes PN  [x]  No     Date Range for reporting period:  Beginnin/8  PN: 10/11  Ending: -    Progress report due (10 Rx/or 30 days whichever is less): visit #70 or /  Recertification due (POC duration/ or 90 days whichever is less): visit #12 or 10/20 (date)     Visit # Insurance Allowable Auth required? Date Range    [x]  Yes - COHERE  []  No -       Visits approved Visits  used Date Range   12 8  -      Latex Allergy:  [x]NO      []YES  Preferred Language for Healthcare:   [x]English       []other:    Functional Scale:        Date assessed:  LEFS: raw score = 0; dysfunction = 100%     LEFS: raw score = 42; dysfunction = 47.5%  10/11  Pain level:  3-4/10     SUBJECTIVE:   Pt bought ankle weights and has been using them at home with exercises. Continues to have limp and hip weakness.      OBJECTIVE:    10/18: L5ERSR, ROL   10/11: Strength: QL L 4-/5 R 4/5; gluteus medius L 4+/5, R 4/5; SLS 15 on Airex R; challneged with neutral spine with deadlifts on R and actication of L QL with gait  10/7 - wide SOPHIA with gait, when observed, the patient narrows SOPHIA and equalizes stride length  9/20: R innominate downslip, [id number], ROL sacral torsion, L SI posterior innominate, R SI anterior innominate SI    RESTRICTIONS/PRECAUTIONS: No SLR until 10/5.  DOS 9/7    Exercises/Interventions:   Therapeutic Exercise (85560)  Resistance / level Sets/sec Reps Notes / Cues   BIKE  SL leg press 65# 3 10 3# 2 20 10/7   Standing Quad  15'' 3 R 10/7   1/2 Kneeling HF Stretch airex 15'' 3 R 10/7   EOT HSS  15'' 3 R  10/7   SLR Flexion  2 10   10/7   3# 1 10 10/7 - ^ weight   3# 1 10 10/7 - ^ weight   Qped SLR Ext 3# 2 10 L/R 10/7   PVC across LB for input   Qped Glute Kick 3# 2 10 10/7  PVC across LB for input    Total Gym B LE squat  R Single leg 2  2 10  10 10/11   Sidelying POsterior gluteus Medius SLR AB  2 10 10/19   Therapeutic Activities (50926)                   10/2 - added    10/2 - added          Neuromuscular Re-ed (06824)            L11   L11      1x  2 min     10/7 - added   Glider lunges  - retro  - lateral   Added 10/5  Added 10/5 - cueing for posterior weight shift     cord walking - 4 way 2 cords yellow 1 6 10/11   Bridge  1 10    Bridge w/ LLE LAQ  5'' 10 10/7   Landmine Bar Deadlift  45# 2 10 R 10/7   KB Deadlift 10# KB 1 10 R/L 10/7    A/P and M/L  Rocks  Dynamic (knees bent)   1  1/10\"   10 each  8 10/7   Rebounder Ball TossAirex  Tandem L/R  R SLS   1   12 L/R 10/11   0#  Purple band 1  1 10  \10 10/11   Bosu Fwd Step up to SLS R LE  Lateral step-ups R 2    1 10    10 10/11   SAQ 3# 2 10 10/18   Standing Static Balance Tandem L/R 1/2 foam 20\" 2 each 10/18   Manual Intervention (25468)       Hip PROM   assessment   Tib/Fem Mobs       Patella Mobs patellar correction of severe R lateral rotation with MFR R TFL followed with STM lateral patellar retinaculum and sustained media patellar glides Grade 3  9/22   Ankle mobs       Lumbar/SI MET : L5ERSR, ROL sacral torsion, R SI posterior innominate SI, L SI anterior innominate SI 10/18            Modalities:     Pt. Education:  -pt educated on diagnosis, prognosis and expectations for rehab  -all pt questions were answered    Home Exercise Program:  Access Code: [email or web address]  URL: [employer]/  Date: 10/11/2021  Prepared by: [address] [place] [address] with L LAQ 10/18  Staggered Stance Gluteal Strengthening - 1 x daily - 7 x weekly - 3 sets - 10 reps  Hip Hiking on Step - 1 x daily - 7 x weekly - 3 sets - 10 reps  Access Code: [email or web address]  URL: [employer]/  Date: 10/07/2021  Prepared by: Gosia Joseph    Exercises  Lateral Step Up - 1 x daily - 7 x weekly - 3 sets - 10 reps  Clamshell with Resistance - 1 x daily - 7 x weekly - 3 sets - 10 reps  Sidelying Hip Abduction - 1 x daily - 7 x weekly - 3 sets - 10 reps  Prone Hip Extension - 1 x daily - 7 x weekly - 3 sets - 10 reps  Prone Hip Extension with Bent Knee - 1 x daily - 7 x weekly - 3 sets - 10 reps  Side Stepping with Resistance at Thighs - 1 x daily - 7 x weekly - 3 sets - 10 reps  Lateral Heel Tap - 1 x daily - 4 x weekly - 3 sets - 10 reps  Walking March - 1 x daily - 4 x weekly - 3 sets - 10 reps  Hip Abduction with Resistance Loop - 1 x daily - 4 x weekly - 2 sets - 10 reps  Hip Extension with Resistance Loop - 1 x daily - 4 x weekly - 2 sets - 10 reps  Prone Terminal Knee Extension - 1 x daily - 4 x weekly - 1 sets - 15 reps - 5 hold  Supine Active Straight Leg Raise - 1 x daily - 4 x weekly - 2 sets - 10 reps  Quadruped Fire Hydrant - 1 x daily - 4 x weekly - 2 sets - 10 reps  Beginner Front Arm Support - 1 x daily - 4 x weekly - 2 sets - 10 reps  Quadruped Hip Extension Kicks - 1 x daily - 4 x weekly - 2 sets - 10 reps    Access Code: [email or web address]  URL: [employer]/  Date: 10/02/2021  Prepared by: Gosia Joseph    Exercises  Hooklying Isometric Clamshell - 1 x daily - 7 x weekly - 3 sets - 10 reps  Lateral Step Up - 1 x daily - 7 x weekly - 3 sets - 10 reps  Sit to Stand - 1 x daily - 7 x weekly - 3 sets - 10 reps  Clamshell with Resistance - 1 x daily - 7 x weekly - 3 sets - 10 reps  Sidelying Hip Abduction - 1 x daily - 7 x weekly - 3 sets - 10 reps  Prone Hip Extension - 1 x daily - 7 x weekly - 3 sets - 10 reps  Prone Hip Extension with Bent Knee - 1 x daily - 7 x weekly - 3 sets - 10 reps  Modified Matteo Stretch - 1 x daily - 7 x weekly - 3 sets - 10 reps  Side Stepping with Resistance at Thighs - 1 x daily - 7 x weekly - 3 sets - 10 reps  Lateral Heel Tap - 1 x daily - 4 x weekly - 3 sets - 10 reps  Walking March - 1 x daily - 4 x weekly - 3 sets - 10 reps  Hip Abduction with Resistance Loop - 1 x daily - 4 x weekly - 2 sets - 10 reps  Hip Extension with Resistance Loop - 1 x daily - 4 x weekly - 2 sets - 10 reps      Access Code: [email or web address]  URL: [email or web address]/  Date: 09/22/2021  Prepared by: Gemma Waldemar    Exercises  Hooklying Isometric Hip Abduction with Belt - 3 x daily - 7 x weekly - 10 reps - 5 hold  Supine Hip Adduction Isometric with Ball - 3 x daily - 7 x weekly - 10 reps - 5 hold  Prone Hip Extension on Table - 1 x daily - 7 x weekly - 3 sets - 10 reps  Hooklying Clamshell with Resistance - 1 x daily - 7 x weekly - 3 sets - 10 reps  Hooklying Isometric Clamshell - 1 x daily - 7 x weekly - 3 sets - 10 reps  Standing Terminal Knee Extension with Resistance - 1 x daily - 7 x weekly - 3 sets - 10 reps  Lateral Step Up - 1 x daily - 7 x weekly - 3 sets - 10 reps  Sit to Stand - 1 x daily - 7 x weekly - 3 sets - 10 reps  Standing Heel Raise - 1 x daily - 7 x weekly - 3 sets - 10 reps  Access Code: [email or web address]  URL: [email or web address]/  Date: 09/20/2021  Prepared by:  Gemma Waldemar    Exercises  Hooklying Isometric Hip Abduction with Belt - 3 x daily - 7 x weekly - 10 reps - 5 hold  Supine Hip Adduction Isometric with Ball - 3 x daily - 7 x weekly - 10 reps - 5 hold  Reviewed HEP from hospital including ankle pumps, quad sets, glute sets, heel slides, hip abd slide and LAQ    Therapeutic Exercise and NMR EXR  [x] (00216) Provided verbal/tactile cueing for activities related to strengthening, flexibility, endurance, ROM for improvements in LE, proximal hip, and core control with self care, mobility, lifting, ambulation. [x] (95266) Provided verbal/tactile cueing for activities related to improving balance, coordination, kinesthetic sense, posture, motor skill, proprioception  to assist with LE, proximal hip, and core control in self care, mobility, lifting, ambulation and eccentric single leg control.   [] (89619) Therapist is in constant attendance of 2 or more patients providing skilled therapy interventions, but not providing any significant amount of measurable one-on-one time to either patient, for improvements in LE, proximal hip, and core control in self care, mobility, lifting, ambulation and eccentric single leg control.      NMR and Therapeutic Activities:    [x] (23018 or 94867) Provided verbal/tactile cueing for activities related to improving balance, coordination, kinesthetic sense, posture, motor skill, proprioception and motor activation to allow for proper function of core, proximal hip and LE with self care and ADLs  [] (17135) Gait Re-education- Provided training and instruction to the patient for proper LE, core and proximal hip recruitment and positioning and eccentric body weight control with ambulation re-education including up and down stairs     Home Exercise Program:    [x] (62252) Reviewed/Progressed HEP activities related to strengthening, flexibility, endurance, ROM of core, proximal hip and LE for functional self-care, mobility, lifting and ambulation/stair navigation   [x] (47609)Reviewed/Progressed HEP activities related to improving balance, coordination, kinesthetic sense, posture, motor skill, proprioception of core, proximal hip and LE for self care, mobility, lifting, and ambulation/stair navigation      Manual Treatments:  PROM / STM / Oscillations-Mobs:  G-I, II, III, IV (PA's, Inf., Post.)  [] (26528) Provided manual therapy to mobilize LE, proximal hip and/or LS spine soft tissue/joints for the purpose of modulating pain, promoting relaxation,  increasing ROM, reducing/eliminating soft tissue swelling/inflammation/restriction, improving soft tissue extensibility and allowing for proper ROM for normal function with self care, mobility, lifting and ambulation. Modalities:  [] (28208) Vasopneumatic compression: Utilized vasopneumatic compression to decrease edema / swelling for the purpose of improving mobility and quad tone / recruitment which will allow for increased overall function including but not limited to self-care, transfers, ambulation, and ascending / descending stairs. Charges:  Timed Code Treatment Minutes: 45   Total Treatment Minutes: 45     [] EVAL - LOW (94962)   [] EVAL - MOD (49272)  [] EVAL - HIGH (41101)  [] RE-EVAL (26890)  [] [place](48334) x 1     [] Ionto  [x] NMR (49073) x  2    [] Vaso  [x] Manual (77516) x   1  [] Ultrasound  [] TA x      [] Mech Traction (88735)  [] Aquatic Therapy x     [] ES (un) (83786):   [] Home Management Training x [] ES(attended) (48550)   [] Group:     [] Other:     GOALS:  Patient stated goal: get walking without walking and back to work and recreational activity ASAP  [] Progressing: [] Met: [] Not Met: [] Adjusted    Therapist goals for Patient:   Short Term Goals: To be achieved in: 2 weeks  1. Independent in HEP and progression per patient tolerance, in order to prevent re-injury. [] Progressing: [x] Met: [] Not Met: [] Adjusted  2. Patient will have a decrease in pain to facilitate improvement in movement, function, and ADLs as indicated by Functional Deficits. [] Progressing: [x] Met: [] Not Met: [] Adjusted    Long Term Goals: To be achieved in: 6 weeks  1. Disability index score of 30% or less for the LEFS to assist with reaching prior level of function.    [x] Progressing: [] Met: [] Not Met: [] Adjusted  2. Patient will demonstrate increased AROM to hip flexion to 110 degrees to allow for proper joint functioning as indicated by patients Functional Deficits. [] Progressing: [x] Met: [] Not Met: [] Adjusted  3. Patient will demonstrate an increase in Strength to at least 4+ as well as good proximal hip strength and control to allow for proper functional mobility as indicated by patients Functional Deficits. [x] Progressing: [] Met: [] Not Met: [] Adjusted  4. Patient will return to functional activities including walking community distances without AD without increased symptoms or restriction. [x] Progressing: [] Met: [] Not Met: [] Adjusted  5. Patient will be able to return to full unrestricted work activity without issues or restrictions. [x] Progressing: [] Met: [] Not Met: [] Adjusted     Overall Progression Towards Functional goals/ Treatment Progress Update:  [x] Patient is progressing as expected towards functional goals listed. [] Progression is slowed due to complexities/Impairments listed. [] Progression has been slowed due to co-morbidities. [] Plan just implemented, too soon to assess goals progression <30days   [] Goals require adjustment due to lack of progress  [] Patient is not progressing as expected and requires additional follow up with physician  [] Other    Persisting Functional Limitations/Impairments:  []Sitting [x]Standing   [x]Walking [x]Stairs   [x]Transfers [x]ADLs   [x]Squatting/bending [x]Kneeling  [x]Housework [x]Job related tasks  [x]Driving [x]Sports/Recreation   [x]Sleeping []Other:    ASSESSMENT:  Patient no longer with pubic or innominate malalignments, but still demonstrates some L5 R rotation and multifidi weakness. Continues to demonstrate limited neuromuscular gluteal contraction with exercises as well as limited awareness of gluteal muscle isolation with exercises.   Patient able to activate R hip flexors and posterior gluteus medius this date with int. Tactile cues. After MFR/STM R TFL/ITB, patient able to perform 3# SAQ but fatigues at end. Pt continues to compensate some with  excessive lumbar flexion as well as hamstring co contraction due to gluteal weakness. Continues to need frequent cueing to slow down with focus on form as pt tends to rush exercises. . Continue with skilled therapy to promote improved gluteal isolation and strength for pelvic stability and improved gait mechanics as well as transfers, stairs and functional daily tasks. Treatment/Activity Tolerance:  [x] Pt able to complete treatment [] Patient limited by fatique  [] Patient limited by pain  [] Patient limited by other medical complications  [] Other:     Prognosis: [x] Good [] Fair  [] Poor    Patient Requires Follow-up: [x] Yes  [] No    PLAN: PT 1-2x / week for 6 weeks. [x] Continue per plan of care [] Alter current plan (see comments)  [] Plan of care initiated [] Hold pending MD visit [] Discharge    Electronically signed by: Patricia Vargas PT, MSPT #[id number]  Note: If patient does not return for scheduled/ recommended follow up visits, this note will serve as a discharge from care along with most recent update on progress.

## 2021-10-26 ENCOUNTER — HOSPITAL ENCOUNTER (OUTPATIENT)
Dept: PHYSICAL THERAPY | Age: 56
Setting detail: THERAPIES SERIES
Discharge: HOME | End: 2021-10-26
Payer: COMMERCIAL

## 2021-10-26 PROCEDURE — 97110 THERAPEUTIC EXERCISES: CPT

## 2021-10-26 PROCEDURE — 97112 NEUROMUSCULAR REEDUCATION: CPT

## 2021-10-26 NOTE — PROGRESS NOTES
[facility]  Phone: [phone]   Fax: [phone]    Physical Therapy Treatment Note/ Progress Report:     Date:  10/26/2021    Patient Name:  Zhen Bloom    :  1965  MRN: [id number]  Restrictions/Precautions:    Medical/Treatment Diagnosis Information:   Diagnosis: M16.11 (ICD-10-CM) - Primary osteoarthritis of right hip S/P 21   Treatment Diagnosis: Decreased R hip ROM, strength, muscle activation, flexibility and pain s/p R PAULETTE with limitations interfering with correct gait mechanics, ADL's, IADL's, recreational and work activity. Insurance/Certification information:  PT Insurance Information: [employer] (Nereyda Faster needed)  Physician Information:  Referring Practitioner: Jacqueline Oliver MD  Plan of care signed (Y/N): [x]  Yes []  No     Date of Patient follow up with Physician:      Progress Report: []  Yes PN  [x]  No     Date Range for reporting period:  Beginnin/8  PN: 10/11  Ending: -    Progress report due (10 Rx/or 30 days whichever is less): visit #90 or   Recertification due (POC duration/ or 90 days whichever is less): visit #12 or 10/20 (date)     Visit # Insurance Allowable Auth required? Date Range   13 60 [x]  Yes - COHERE  []  No -       Visits approved Visits  used Date Range   12 9  -      Latex Allergy:  [x]NO      []YES  Preferred Language for Healthcare:   [x]English       []other:    Functional Scale:        Date assessed:  LEFS: raw score = 0; dysfunction = 100%     LEFS: raw score = 42; dysfunction = 47.5%  10/11    Pain level:  3-/10     SUBJECTIVE:  Pt reports having improved endurance as she was able to go for a 2 mile walk. Continues to have aching in hip with increased activity. Worked 7 days last week and was up on feet more with increased work load.      OBJECTIVE:    10/18: L5ERSR, ROL   10/11: Strength: QL L 4-/5 R 4/5; gluteus medius L 4+/5, R 4/5; SLS 15 on [employer] R; challneged with [email or web address]  Date: 10/11/2021  Prepared by: [address] [place] [address] with L LAQ 10/18  Staggered Stance Gluteal Strengthening - 1 x daily - 7 x weekly - 3 sets - 10 reps  Hip Hiking on Step - 1 x daily - 7 x weekly - 3 sets - 10 reps  Access Code: [email or web address]  URL: [employer]/  Date: 10/07/2021  Prepared by: Cary Blare    Exercises  Lateral Step Up - 1 x daily - 7 x weekly - 3 sets - 10 reps  Clamshell with Resistance - 1 x daily - 7 x weekly - 3 sets - 10 reps  Sidelying Hip Abduction - 1 x daily - 7 x weekly - 3 sets - 10 reps  Prone Hip Extension - 1 x daily - 7 x weekly - 3 sets - 10 reps  Prone Hip Extension with Bent Knee - 1 x daily - 7 x weekly - 3 sets - 10 reps  Side Stepping with Resistance at Thighs - 1 x daily - 7 x weekly - 3 sets - 10 reps  Lateral Heel Tap - 1 x daily - 4 x weekly - 3 sets - 10 reps  Walking March - 1 x daily - 4 x weekly - 3 sets - 10 reps  Hip Abduction with Resistance Loop - 1 x daily - 4 x weekly - 2 sets - 10 reps  Hip Extension with Resistance Loop - 1 x daily - 4 x weekly - 2 sets - 10 reps  Prone Terminal Knee Extension - 1 x daily - 4 x weekly - 1 sets - 15 reps - 5 hold  Supine Active Straight Leg Raise - 1 x daily - 4 x weekly - 2 sets - 10 reps  Quadruped Fire Hydrant - 1 x daily - 4 x weekly - 2 sets - 10 reps  Beginner Front Arm Support - 1 x daily - 4 x weekly - 2 sets - 10 reps  Quadruped Hip Extension Kicks - 1 x daily - 4 x weekly - 2 sets - 10 reps    Access Code: [email or web address]  URL: [employer]/  Date: 10/02/2021  Prepared by: Cary Blare    Exercises  Hooklying Isometric Clamshell - 1 x daily - 7 x weekly - 3 sets - 10 reps  Lateral Step Up - 1 x daily - 7 x weekly - 3 sets - 10 reps  Sit to Stand - 1 x daily - 7 x weekly - 3 sets - 10 reps  Clamshell with Resistance - 1 x daily - 7 x weekly - 3 sets - 10 reps  Sidelying Hip Abduction - 1 x daily - 7 x weekly - 3 sets - 10 reps  Prone Hip Extension - 1 x daily - 7 x weekly - 3 sets - 10 reps  Prone Hip Extension with Bent Knee - 1 x daily - 7 x weekly - 3 sets - 10 reps  Modified Matteo Stretch - 1 x daily - 7 x weekly - 3 sets - 10 reps  Side Stepping with Resistance at Thighs - 1 x daily - 7 x weekly - 3 sets - 10 reps  Lateral Heel Tap - 1 x daily - 4 x weekly - 3 sets - 10 reps  Walking March - 1 x daily - 4 x weekly - 3 sets - 10 reps  Hip Abduction with Resistance Loop - 1 x daily - 4 x weekly - 2 sets - 10 reps  Hip Extension with Resistance Loop - 1 x daily - 4 x weekly - 2 sets - 10 reps      Access Code: [email or web address]  URL: [employer]/  Date: 09/22/2021  Prepared by: [facility]    Exercises  Hooklying Isometric Hip Abduction with Belt - 3 x daily - 7 x weekly - 10 reps - 5 hold  Supine Hip Adduction Isometric with Ball - 3 x daily - 7 x weekly - 10 reps - 5 hold  Prone Hip Extension on Table - 1 x daily - 7 x weekly - 3 sets - 10 reps  Hooklying Clamshell with Resistance - 1 x daily - 7 x weekly - 3 sets - 10 reps  Hooklying Isometric Clamshell - 1 x daily - 7 x weekly - 3 sets - 10 reps  Standing Terminal Knee Extension with Resistance - 1 x daily - 7 x weekly - 3 sets - 10 reps  Lateral Step Up - 1 x daily - 7 x weekly - 3 sets - 10 reps  Sit to Stand - 1 x daily - 7 x weekly - 3 sets - 10 reps  Standing Heel Raise - 1 x daily - 7 x weekly - 3 sets - 10 reps  Access Code: [email or web address]  URL: [email or web address]  Date: 09/20/2021  Prepared by:  [facility]    Exercises  Hooklying Isometric Hip Abduction with Belt - 3 x daily - 7 x weekly - 10 reps - 5 hold  Supine Hip Adduction Isometric with Ball - 3 x daily - 7 x weekly - 10 reps - 5 hold  Reviewed HEP from hospital including ankle pumps, quad sets, glute sets, heel slides, hip abd slide and LAQ    Therapeutic Exercise and NMR EXR  [x] (14153) Provided verbal/tactile cueing for activities related to strengthening, flexibility, endurance, ROM for improvements in LE, proximal hip, and core control with self care, mobility, lifting, ambulation. [x] (94327) Provided verbal/tactile cueing for activities related to improving balance, coordination, kinesthetic sense, posture, motor skill, proprioception  to assist with LE, proximal hip, and core control in self care, mobility, lifting, ambulation and eccentric single leg control.   [] (58280) Therapist is in constant attendance of 2 or more patients providing skilled therapy interventions, but not providing any significant amount of measurable one-on-one time to either patient, for improvements in LE, proximal hip, and core control in self care, mobility, lifting, ambulation and eccentric single leg control.      NMR and Therapeutic Activities:    [x] (72919 or 22231) Provided verbal/tactile cueing for activities related to improving balance, coordination, kinesthetic sense, posture, motor skill, proprioception and motor activation to allow for proper function of core, proximal hip and LE with self care and ADLs  [] (11644) Gait Re-education- Provided training and instruction to the patient for proper LE, core and proximal hip recruitment and positioning and eccentric body weight control with ambulation re-education including up and down stairs     Home Exercise Program:    [x] (05173) Reviewed/Progressed HEP activities related to strengthening, flexibility, endurance, ROM of core, proximal hip and LE for functional self-care, mobility, lifting and ambulation/stair navigation   [x] (41384)Reviewed/Progressed HEP activities related to improving balance, coordination, kinesthetic sense, posture, motor skill, proprioception of core, proximal hip and LE for self care, mobility, lifting, and ambulation/stair navigation      Manual Treatments:  PROM / STM / Oscillations-Mobs:  G-I, II, III, IV (PA's, Inf., Post.)  [] (35124) Provided manual therapy to mobilize LE, proximal hip and/or LS spine soft tissue/joints for the purpose of modulating pain, promoting relaxation,  increasing ROM, reducing/eliminating soft tissue swelling/inflammation/restriction, improving soft tissue extensibility and allowing for proper ROM for normal function with self care, mobility, lifting and ambulation. Modalities:  [] (59607) Vasopneumatic compression: Utilized vasopneumatic compression to decrease edema / swelling for the purpose of improving mobility and quad tone / recruitment which will allow for increased overall function including but not limited to self-care, transfers, ambulation, and ascending / descending stairs. Charges:  Timed Code Treatment Minutes: 45   Total Treatment Minutes: 45     [] EVAL - LOW (45216)   [] EVAL - MOD (99960)  [] EVAL - HIGH (70677)  [] RE-EVAL (87455)  [x] WJ(80839) x 2     [] Ionto  [x] NMR (50589) x  1    [] Vaso  [] Manual (44289) x      [] Ultrasound  [] TA x      [] Mech Traction (82336)  [] Aquatic Therapy x     [] ES (un) (06562):   [] Home Management Training x [] ES(attended) (38793)   [] Group:     [] Other:     GOALS:  Patient stated goal: get walking without walking and back to work and recreational activity ASAP  [] Progressing: [] Met: [] Not Met: [] Adjusted    Therapist goals for Patient:   Short Term Goals: To be achieved in: 2 weeks  1. Independent in HEP and progression per patient tolerance, in order to prevent re-injury. [] Progressing: [x] Met: [] Not Met: [] Adjusted  2. Patient will have a decrease in pain to facilitate improvement in movement, function, and ADLs as indicated by Functional Deficits. [] Progressing: [x] Met: [] Not Met: [] Adjusted    Long Term Goals: To be achieved in: 6 weeks  1. Disability index score of 30% or less for the LEFS to assist with reaching prior level of function. [x] Progressing: [] Met: [] Not Met: [] Adjusted  2. Patient will demonstrate increased AROM to hip flexion to 110 degrees to allow for proper joint functioning as indicated by patients Functional Deficits.    [] Progressing: [x] Met: [] Not Met: [] Adjusted  3. Patient will demonstrate an increase in Strength to at least 4+ as well as good proximal hip strength and control to allow for proper functional mobility as indicated by patients Functional Deficits. [x] Progressing: [] Met: [] Not Met: [] Adjusted  4. Patient will return to functional activities including walking community distances without AD without increased symptoms or restriction. [x] Progressing: [] Met: [] Not Met: [] Adjusted  5. Patient will be able to return to full unrestricted work activity without issues or restrictions. [x] Progressing: [] Met: [] Not Met: [] Adjusted     Overall Progression Towards Functional goals/ Treatment Progress Update:  [x] Patient is progressing as expected towards functional goals listed. [] Progression is slowed due to complexities/Impairments listed. [] Progression has been slowed due to co-morbidities. [] Plan just implemented, too soon to assess goals progression <30days   [] Goals require adjustment due to lack of progress  [] Patient is not progressing as expected and requires additional follow up with physician  [] Other    Persisting Functional Limitations/Impairments:  []Sitting [x]Standing   [x]Walking [x]Stairs   [x]Transfers [x]ADLs   [x]Squatting/bending [x]Kneeling  [x]Housework [x]Job related tasks  [x]Driving [x]Sports/Recreation   [x]Sleeping []Other:    ASSESSMENT:  Pt demonstrates continued hip weakness with exercises which limits balance and gait mechanics. Pt demonstrates instability with dead lifts and resisted walking due to hip weakness. Pt also continues to demonstrate leg length difference with education on purchasing heel lifts to assist in more normal leg length to decrease limp and promote improved gait mechanics. Pt challenged by balance exercises and single leg exercises with cueing needed to maintain level hips and minimize L side hip drop with single leg bridges.  Continue to focus on form and control to minimize pt rushing through routine with continued focus on improving strength and endurance for full return to PLOF without restrictions. Treatment/Activity Tolerance:  [x] Pt able to complete treatment [] Patient limited by fatique  [] Patient limited by pain  [] Patient limited by other medical complications  [] Other:     Prognosis: [x] Good [] Fair  [] Poor    Patient Requires Follow-up: [x] Yes  [] No    PLAN: PT 1-2x / week for 6 weeks. [x] Continue per plan of care [] Alter current plan (see comments)  [] Plan of care initiated [] Hold pending MD visit [] Discharge    Electronically signed by: Deyanira Swan, PTA [id number]  Note: If patient does not return for scheduled/ recommended follow up visits, this note will serve as a discharge from care along with most recent update on progress.

## 2021-10-28 ENCOUNTER — OFFICE VISIT (OUTPATIENT)
Dept: ORTHOPEDIC SURGERY | Age: 56
End: 2021-10-28

## 2021-10-28 VITALS — HEIGHT: 66 IN | WEIGHT: 159 LBS | BODY MASS INDEX: 25.55 KG/M2

## 2021-10-28 DIAGNOSIS — M16.11 PRIMARY OSTEOARTHRITIS OF RIGHT HIP: Primary | ICD-10-CM

## 2021-10-28 PROCEDURE — 99024 POSTOP FOLLOW-UP VISIT: CPT | Performed by: PHYSICIAN ASSISTANT

## 2021-10-28 NOTE — PROGRESS NOTES
Patient Name: Haleigh Ford  Medical Record Number: [id number]  YOB: 1965  Date of Encounter: 10/28/2021    Chief Complaint   Patient presents with    Post-Op Check     Right PAULETTE 9/7/21, slowly progressing, got  a shoe lilft and paulette has helped. Total Hip Follow-up  Patient here for 7 weeks post right direct anterior total hip arthroplasty follow-up. Patient states pain is manageable and is no longer taking narcotics. The patient denies fever, wound drainage, increasing redness, pus, increasing pain, increasing swelling. Post op problems reported: Patient is still struggling with her limb length discrepancy. She has purchased shoe lifts and states this has helped. She is ambulating without assistive device. She is working with physical therapy. Patient is still taking Vitamin D supplementation. DVT prophylaxis is completed. The patient's  past medical history, medications, allergies,  family history, social history, and review of systems have been reviewed, and dated and are recorded in the chart under the 'MEDIA\" tab. Physical Exam:   Ms. Haleigh Ford appears well, she is in no apparent distress, she demonstrates appropriate mood & affect. She is alert and oriented to person, place and time. Ht 5' 6\" (1.676 m)   Wt 159 lb (72.1 kg)   BMI 25.66 kg/m²     Right Hip: Range of motion is: 40 degrees abduction, 115 degrees flexion, 35 degrees internal rotation and 35 degrees external rotation. She has minimal pain with range of motion of the hip. Patient has 4+/5 motor strength with movements of the right hip. Patient is notwearing her bilateral GREG stockings. There is no right lower extremity edema. She is neurovascularly intact distally. Radiology:   X-rays obtained and reviewed in office:  Views: AP pelvis and 2 views of the right hip. Impression: The prosthesis is well aligned. There is no evidence of loosening or subsidence.     Orders:  Orders Placed This

## 2021-10-30 ENCOUNTER — HOSPITAL ENCOUNTER (OUTPATIENT)
Dept: PHYSICAL THERAPY | Age: 56
Setting detail: THERAPIES SERIES
Discharge: HOME | End: 2021-10-30
Payer: COMMERCIAL

## 2021-10-30 PROCEDURE — 97112 NEUROMUSCULAR REEDUCATION: CPT

## 2021-10-30 PROCEDURE — 97140 MANUAL THERAPY 1/> REGIONS: CPT

## 2021-10-30 PROCEDURE — 97110 THERAPEUTIC EXERCISES: CPT

## 2021-10-30 NOTE — PLAN OF CARE
[facility]  Phone: [phone]   Fax: [phone]    Physical Therapy Re-Certification Plan of Care    Dear FAHEEM Jones:     We had the pleasure of treating the following patient for physical therapy services at [facility]. A summary of our findings can be found in the updated assessment below. This includes our plan of care. If you have any questions or concerns regarding these findings, please do not hesitate to contact me at the office phone number checked above. Thank you for the referral.     Physician Signature:________________________________Date:__________________  By signing above (or electronic signature), therapist's plan is approved by physician      Functional Outcome:   LEFS: raw score=47; dysfunction=41%   10/30    Overall Response to Treatment:   [x]Patient is responding well to treatment and improvement is noted with regards  to goals   []Patient should continue to improve in reasonable time if they continue HEP   []Patient has plateaued and is no longer responding to skilled PT intervention    []Patient is getting worse and would benefit from return to referring MD   []Patient unable to adhere to initial POC   [x]Other: 10/30: Strength:  R hip flexion 4/5, R ABD 4/5, R hip ext 4/5. Mal-alignment present in pelvis: sig pubic downslip, L outflare & mild upslip innom. Gait: independent without AD, pt tends to protract pelvis with some IR of hip in swing and stance phase and has mild glut med lag in activation which with the functional leg length difference makes her lurch in stance phase. Alignment was corrected with MET followed by some core stabs and hip strengthening. This helped level her leg length. Pt can benefit from another 4-6 wks of PT to help further strengthen her hip, decrease pain and normalize her gait.  Will submit request for more insurance auth in order to extend PT    Date range of Visits: 21-10/30/21  Total Visits: Eval + 13    Recommendation:    [x]Continue PT 1-2x / wk for 6 weeks. []Hold PT, pending MD visit      Physical Therapy Treatment Note/ Progress Report:     Date:  10/30/2021    Patient Name:  Yane Elmore    :  1965  MRN: [id number]  Restrictions/Precautions:    Medical/Treatment Diagnosis Information:   Diagnosis: M16.11 (ICD-10-CM) - Primary osteoarthritis of right hip S/P 21   Treatment Diagnosis: Decreased R hip ROM, strength, muscle activation, flexibility and pain s/p R PAULETTE with limitations interfering with correct gait mechanics, ADL's, IADL's, recreational and work activity. Insurance/Certification information:  PT Insurance Information: [employer] (Nayeli Jackie needed)  Physician Information:  Referring Practitioner: Kathy Rosales MD  Plan of care signed (Y/N): [x]  Yes  []  No     Date of Patient follow up with Physician:      Progress Report: [x]  Yes -recert   []  No     Date Range for reporting period:  Beginnin/8  PN:   Recert:   Ending: -    Progress report due (10 Rx/or 30 days whichever is less): visit #46 or   Recertification due (POC duration/ or 90 days whichever is less):     Visit # Insurance Allowable Auth required? Date Range   Eval +  60 [x]  Yes - [facility]  []  No -       Visits approved Visits  used Date Range     *submitted COHERE request for more auth 10/30 9/13 -      Latex Allergy:  [x]NO      []YES  Preferred Language for Healthcare:   [x]English       []other:    Functional Scale:        Date assessed:  LEFS: raw score = 0; dysfunction = 100%     LEFS: raw score = 42; dysfunction = 47.5%  10/11  LEFS: raw score=47; dysfunction=41%   10/30    Pain level:  2-/10     SUBJECTIVE:  Pt reports she's extra sore today everywhere since getting Moderna booster. Her R hip is sore from working 12 days in a row and being on feet so much.  She notices a lot of scar tightness and tingling and has been working on it when she can. She still notices a leg length difference (LLE longer than RLE) that has been present since surgery. It makes her lurch when walking and she wears 2 heel lifts in left shoe to make her more level. She feels she is getting stronger but still not back to normal    OBJECTIVE:    10/30: Strength:  R hip flexion 4/5, R ABD 4/5, R hip ext 4/5. Mal-alignment present in pelvis: sig pubic downslip, L outflare & mild upslip innom. Gait: independent without AD, pt tends to protract pelvis with some IR of hip in swing and stance phase and has mild glut med lag in activation which with the functional leg length difference makes her lurch in stance phase. 10/18Sherilyn Hook, ROL   10/11: Strength: QL L 4-/5 R 4/5; gluteus medius L 4+/5, R 4/5; SLS 15 on Airex R; challneged with neutral spine with deadlifts on R and actication of L QL with gait  10/7 - wide SOPHIA with gait, when observed, the patient narrows SOPHIA and equalizes stride length  9/20: R innominate downslip, [id number]/FRSL, ROL sacral torsion, L SI posterior innominate, R SI anterior innominate SI    RESTRICTIONS/PRECAUTIONS: No SLR until 10/5.  DOS 9/7    Exercises/Interventions:   Therapeutic Exercise (53853)  Resistance / level Sets/time Reps Notes / Cues   BIKE  6'  10/30   SL leg press 70# 3 15 L 10/30   Standing Quad  15'' 3 R 10/7   1/2 Kneeling HF Stretch airex 10/7   EOT HSS  10/7   SLR Flexion  1/10 pulses 3 R/L   10/7  10/30 done after MET   Qped SLR Ext 3# 10/7   PVC across LB for input   Qped Glute Kick 3# 10/7  PVC across LB for input    Total Gym B LE squat  R Single leg Bridge w/ hip ABD Lime TB 2 15 10/30 done after MET   Sidelying Posterior gluteus Medius SLR AB  2 10 R/L 10/30 done after MET          Therapeutic Activities (99588)            10/2 - added    10/2 - added                 Neuromuscular Re-ed (29032)            L11   L11      1x  2 min     10/7 - added      Added 10/5  Added 10/5 - cueing for posterior weight shift    Sport cord walking - 4 way 2 cords yellow 1 6 10/30 cues to take larger equal steps   Bridge w/ LLE LAQ  5'' 10 10/7    45# 2 10 R 10/7   KB Deadlift 10# KB 1 10 R/L 10/30 cues to let standing knee bend slightly and keep pelvis more level   Airex  Tandem L/R  R SLS   1   12 L/R 10/11   0#  Purple band 1  1 10  \10 10/11   BOSU (blue up) -Fwd lunge hold  -Fwd Step up to SLS from lunge  -Slow march 1  1    1 6 R  10 R    10 R/L 10/30   SAQ 3# 2 10 10/18    Tandem L/R 1/2 foam 20\" 2 each 10/18   Gait mechanics After leveling pelvis and leg length had pt work on walking without protracting and IR R-hip 3'  10/30          Manual Intervention (48033)       Hip PROM   assessment    patellar correction of severe R lateral rotation with MFR R TFL followed with STM lateral patellar retinaculum and sustained media patellar glides Grade 3  9/22   Lumbar/SI MET -Rpubic downslip  -L-inflare  -L upslip innom 12'  10/30            Modalities:     Pt. Education:  -pt educated on diagnosis, prognosis and expectations for rehab  -all pt questions were answered    Home Exercise Program:  Access Code: [email or web address]  URL: Prepared Response/  Date: 10/11/2021  Prepared by: Martha with L LAQ 10/18  Staggered Stance Gluteal Strengthening - 1 x daily - 7 x weekly - 3 sets - 10 reps  Hip Hiking on Step - 1 x daily - 7 x weekly - 3 sets - 10 reps    Access Code: [email or web address]  URL: Prepared Response/  Date: 10/07/2021  Prepared by: Gosia Joseph  Exercises  Lateral Step Up - 1 x daily - 7 x weekly - 3 sets - 10 reps  Clamshell with Resistance - 1 x daily - 7 x weekly - 3 sets - 10 reps  Sidelying Hip Abduction - 1 x daily - 7 x weekly - 3 sets - 10 reps  Prone Hip Extension - 1 x daily - 7 x weekly - 3 sets - 10 reps  Prone Hip Extension with Bent Knee - 1 x daily - 7 x weekly - 3 sets - 10 reps  Side Stepping with Resistance at Thighs - 1 x daily - 7 x weekly - 3 sets - 10 reps  Lateral Heel Tap - 1 x daily - 4 x weekly - 3 sets - 10 reps  Walking March - 1 x daily - 4 x weekly - 3 sets - 10 reps  Hip Abduction with Resistance Loop - 1 x daily - 4 x weekly - 2 sets - 10 reps  Hip Extension with Resistance Loop - 1 x daily - 4 x weekly - 2 sets - 10 reps  Prone Terminal Knee Extension - 1 x daily - 4 x weekly - 1 sets - 15 reps - 5 hold  Supine Active Straight Leg Raise - 1 x daily - 4 x weekly - 2 sets - 10 reps  Quadruped Fire Hydrant - 1 x daily - 4 x weekly - 2 sets - 10 reps  Beginner Front Arm Support - 1 x daily - 4 x weekly - 2 sets - 10 reps  Quadruped Hip Extension Kicks - 1 x daily - 4 x weekly - 2 sets - 10 reps    Access Code: [email or web address]  URL: [place]/  Date: 10/02/2021  Prepared by: Army Maria Elena  Exercises  Hooklying Isometric Clamshell - 1 x daily - 7 x weekly - 3 sets - 10 reps  Lateral Step Up - 1 x daily - 7 x weekly - 3 sets - 10 reps  Sit to Stand - 1 x daily - 7 x weekly - 3 sets - 10 reps  Clamshell with Resistance - 1 x daily - 7 x weekly - 3 sets - 10 reps  Sidelying Hip Abduction - 1 x daily - 7 x weekly - 3 sets - 10 reps  Prone Hip Extension - 1 x daily - 7 x weekly - 3 sets - 10 reps  Prone Hip Extension with Bent Knee - 1 x daily - 7 x weekly - 3 sets - 10 reps  Modified Matteo Stretch - 1 x daily - 7 x weekly - 3 sets - 10 reps  Side Stepping with Resistance at Thighs - 1 x daily - 7 x weekly - 3 sets - 10 reps  Lateral Heel Tap - 1 x daily - 4 x weekly - 3 sets - 10 reps  Walking March - 1 x daily - 4 x weekly - 3 sets - 10 reps  Hip Abduction with Resistance Loop - 1 x daily - 4 x weekly - 2 sets - 10 reps  Hip Extension with Resistance Loop - 1 x daily - 4 x weekly - 2 sets - 10 reps      Access Code: [email or web address]  URL: [place]/  Date: 09/22/2021  Prepared by:  [facility]  Exercises  Hooklying Isometric Hip Abduction with Belt - 3 x daily - 7 x weekly - 10 reps - 5 hold  Supine Hip Adduction Isometric with improving balance, coordination, kinesthetic sense, posture, motor skill, proprioception and motor activation to allow for proper function of core, proximal hip and LE with self care and ADLs  [] (00650) Gait Re-education- Provided training and instruction to the patient for proper LE, core and proximal hip recruitment and positioning and eccentric body weight control with ambulation re-education including up and down stairs     Home Exercise Program:    [] (94264) Reviewed/Progressed HEP activities related to strengthening, flexibility, endurance, ROM of core, proximal hip and LE for functional self-care, mobility, lifting and ambulation/stair navigation   [] (63559)Reviewed/Progressed HEP activities related to improving balance, coordination, kinesthetic sense, posture, motor skill, proprioception of core, proximal hip and LE for self care, mobility, lifting, and ambulation/stair navigation      Manual Treatments:  PROM / STM / Oscillations-Mobs:  G-I, II, III, IV (PA's, Inf., Post.)  [x] (29225) Provided manual therapy to mobilize LE, proximal hip and/or LS spine soft tissue/joints for the purpose of modulating pain, promoting relaxation,  increasing ROM, reducing/eliminating soft tissue swelling/inflammation/restriction, improving soft tissue extensibility and allowing for proper ROM for normal function with self care, mobility, lifting and ambulation. Modalities:  [] (56038) Vasopneumatic compression: Utilized vasopneumatic compression to decrease edema / swelling for the purpose of improving mobility and quad tone / recruitment which will allow for increased overall function including but not limited to self-care, transfers, ambulation, and ascending / descending stairs.        Charges:  Timed Code Treatment Minutes: 65   Total Treatment Minutes: 65     [] EVAL - LOW (21235)   [] EVAL - MOD (67079)  [] EVAL - HIGH (04120)  [] RE-EVAL (04712)  [x] SX(84190) x 2     [] Ionto  [x] NMR (87906) x  1    [] Vaso  [x] Manual (81070) x  1    [] Ultrasound  [] TA x      [] Mech Traction (56326)  [] Aquatic Therapy x     [] ES (un) (65798):   [] Home Management Training x [] ES(attended) (94390)   [] Group:     [] Other:     GOALS:  Patient stated goal: get walking without pain and back to work and recreational activity ASAP  [x] Progressing: [] Met: [] Not Met: [] Adjusted    Therapist goals for Patient:   Short Term Goals: To be achieved in: 2 weeks  1. Independent in HEP and progression per patient tolerance, in order to prevent re-injury. [] Progressing: [x] Met: [] Not Met: [] Adjusted  2. Patient will have a decrease in pain to facilitate improvement in movement, function, and ADLs as indicated by Functional Deficits. [] Progressing: [x] Met: [] Not Met: [] Adjusted    Long Term Goals: To be achieved in: 6 weeks  1. Disability index score of 30% or less for the LEFS to assist with reaching prior level of function. [x] Progressing: [] Met: [] Not Met: [] Adjusted  2. Patient will demonstrate increased AROM to hip flexion to 110 degrees to allow for proper joint functioning as indicated by patients Functional Deficits. [] Progressing: [x] Met: [] Not Met: [] Adjusted  3. Patient will demonstrate an increase in Strength to at least 4+ as well as good proximal hip strength and control to allow for proper functional mobility as indicated by patients Functional Deficits. [x] Progressing: [] Met: [] Not Met: [] Adjusted  4. Patient will return to functional activities including walking community distances without AD without increased symptoms or restriction, or limp (PATIENT TOLERATING MORE TIME ON FEET BUT STILL HAS NOTICEABLE GAIT DEVIATION DUE TO FUNCTIONAL LEG LENGTH DIFFERENCE)  [x] Progressing: [] Met: [] Not Met: [] Adjusted  5. Patient will be able to return to full unrestricted work activity without issues or restrictions.      [x] Progressing: [] Met: [] Not Met: [] Adjusted     Overall Progression Towards Functional goals/ Treatment Progress Update:  [x] Patient is progressing as expected towards functional goals listed. [] Progression is slowed due to complexities/Impairments listed. [] Progression has been slowed due to co-morbidities. [] Plan just implemented, too soon to assess goals progression <30days   [] Goals require adjustment due to lack of progress  [] Patient is not progressing as expected and requires additional follow up with physician  [] Other    Persisting Functional Limitations/Impairments:  []Sitting [x]Standing   [x]Walking [x]Stairs   []Transfers [x]ADLs   [x]Squatting/bending [x]Kneeling  [x]Housework [x]Job related tasks  []Driving [x]Sports/Recreation   [x]Sleeping []Other:    ASSESSMENT:  10/30 see POC statement above    Treatment/Activity Tolerance:  [x] Pt able to complete treatment [] Patient limited by fatique  [] Patient limited by pain  [] Patient limited by other medical complications  [] Other:     Prognosis: [x] Good [] Fair  [] Poor    Patient Requires Follow-up: [x] Yes  [] No    PLAN: PT 1-2x / week for 6 weeks. 10/30 realized pt was over her authorized visits and submitted request for more Greg Alvarez  [x] Continue per plan of care [] Alter current plan (see comments)  [] Plan of care initiated [] Hold pending MD visit [] Discharge    Electronically signed by: Nusrat Dash PT /DPT    Note: If patient does not return for scheduled/ recommended follow up visits, this note will serve as a discharge from care along with most recent update on progress.

## 2021-11-01 ENCOUNTER — HOSPITAL ENCOUNTER (OUTPATIENT)
Dept: PHYSICAL THERAPY | Age: 56
Setting detail: THERAPIES SERIES
Discharge: HOME | End: 2021-11-01
Payer: COMMERCIAL

## 2021-11-01 PROCEDURE — 97110 THERAPEUTIC EXERCISES: CPT

## 2021-11-01 PROCEDURE — 97112 NEUROMUSCULAR REEDUCATION: CPT

## 2021-11-01 PROCEDURE — 97530 THERAPEUTIC ACTIVITIES: CPT

## 2021-11-01 NOTE — FLOWSHEET NOTE
[facility]  Phone: [phone]   Fax: [phone]    Physical Therapy Re-Certification Plan of Care    Dear Dr Kurt Fry PA:     We had the pleasure of treating the following patient for physical therapy services at [facility]. A summary of our findings can be found in the updated assessment below. This includes our plan of care. If you have any questions or concerns regarding these findings, please do not hesitate to contact me at the office phone number checked above. Thank you for the referral.     Physician Signature:________________________________Date:__________________  By signing above (or electronic signature), therapist's plan is approved by physician      Functional Outcome:   LEFS: raw score=47; dysfunction=41%   10/30    Overall Response to Treatment:   [x]Patient is responding well to treatment and improvement is noted with regards  to goals   []Patient should continue to improve in reasonable time if they continue HEP   []Patient has plateaued and is no longer responding to skilled PT intervention    []Patient is getting worse and would benefit from return to referring MD   []Patient unable to adhere to initial POC   [x]Other: 10/30: Strength:  R hip flexion 4/5, R ABD 4/5, R hip ext 4/5. Mal-alignment present in pelvis: sig pubic downslip, L outflare & mild upslip innom. Gait: independent without AD, pt tends to protract pelvis with some IR of hip in swing and stance phase and has mild glut med lag in activation which with the functional leg length difference makes her lurch in stance phase. Alignment was corrected with MET followed by some core stabs and hip strengthening. This helped level her leg length. Pt can benefit from another 4-6 wks of PT to help further strengthen her hip, decrease pain and normalize her gait.  Will submit request for more insurance auth in order to extend PT    Date range of Visits: 21-10/30/21  Total Visits: Eval + 13    Recommendation:    [x]Continue PT 1-2x / wk for 6 weeks. []Hold PT, pending MD visit      Physical Therapy Treatment Note/ Progress Report:     Date:  2021    Patient Name:  Pepe Campuzano    :  1965  MRN: [id number]  Restrictions/Precautions:    Medical/Treatment Diagnosis Information:   Diagnosis: M16.11 (ICD-10-CM) - Primary osteoarthritis of right hip S/P 21   Treatment Diagnosis: Decreased R hip ROM, strength, muscle activation, flexibility and pain s/p R PAULETTE with limitations interfering with correct gait mechanics, ADL's, IADL's, recreational and work activity. Insurance/Certification information:  PT Insurance Information: [employer] (Eletha Sluder needed)  Physician Information:  Referring Practitioner: Delia Alonzo MD  Plan of care signed (Y/N): [x]  Yes  []  No     Date of Patient follow up with Physician:      Progress Report: []  Yes -recert   [x]  No     Date Range for reporting period:  Beginnin/8  PN:   Recert:    Ending: -    Progress report due (10 Rx/or 30 days whichever is less): visit #94 or 53/97    Recertification due (POC duration/ or 90 days whichever is less):     Visit # Insurance Allowable Auth required? Date Range   Eval +   14 60 [x]  Yes - COHERE  []  No -21-22       Visits approved Visits  used Date Range     10 12  2  - 21-22     Latex Allergy:  [x]NO      []YES  Preferred Language for Healthcare:   [x]English       []other:    Functional Scale:        Date assessed:  LEFS: raw score = 0; dysfunction = 100%     LEFS: raw score = 42; dysfunction = 47.5%  10/11  LEFS: raw score=47; dysfunction=41%   10/30    Pain level:  2-10     SUBJECTIVE:  Pt reports she felt the MET helped her LLD but it went away after a day or so    OBJECTIVE:    10/30: Strength:  R hip flexion 4/5, R ABD 4/5, R hip ext 4/5.   Mal-alignment present in pelvis: sig pubic downslip, L outflare & mild upslip innom. Gait: independent without AD, pt tends to protract pelvis with some IR of hip in swing and stance phase and has mild glut med lag in activation which with the functional leg length difference makes her lurch in stance phase. 10/18Rojelio Gell, ROL   10/11: Strength: QL L 4-/5 R 4/5; gluteus medius L 4+/5, R 4/5; SLS 15 on Airex R; challneged with neutral spine with deadlifts on R and actication of L QL with gait  10/7 - wide SOPHIA with gait, when observed, the patient narrows SOPHIA and equalizes stride length  9/20: R innominate downslip, [id number], ROL sacral torsion, L SI posterior innominate, R SI anterior innominate SI    RESTRICTIONS/PRECAUTIONS: No SLR until 10/5.  DOS 9/7    Exercises/Interventions:   Therapeutic Exercise (27988)  Resistance / level Sets/time Reps Notes / Cues   BIKE  4'  10/30   SL leg press 70# 3 15 L 10/30   Standing Quad  10/7   1/2 Kneeling HF Stretch airex 10/7   EOT HSS  10/7   SLR Flexion  1/10 pulses 3 R/L 10/7  10/30 done after MET   Qped SLR Ext 10/7   PVC across LB for input   Qped Glute Kick 10/7  PVC across LB for input    Total Gym B LE squat  R Single leg Bridge w/ hip ABD Lime TB 2 15 10/30 done after MET   Sidelying Posterior gluteus Medius SLR AB  2 10 R/L 10/30 done after MET          Therapeutic Activities (34388)       Lateral side stepping blue 10 steps 3 laps     10/2 - added   TRX 3-point excursion  1 10x R in stance 10/2 - added                 Neuromuscular Re-ed (81621)            L11   L11      1x  2 min     10/7 - added      Added 10/5  Added 10/5 - cueing for posterior weight shift    Sport cord walking - 4 way 10/30 cues to take larger equal steps   Bridge w/ LLE LAQ  5'' 10 10/7    45# 2 10 R 10/7   KB Deadlift 10# KB 1 10 R/L 10/30 cues to let standing knee bend slightly and keep pelvis more level   Airex  Tandem L/R  R SLS   1   12 L/R 10/11   8\" step-ups with R LE with L LE tap on 6\" step (on side)  1 15 10/11   BOSU (blue up) -Fwd lunge hold  -Fwd Step up to SLS from lunge   1  1    1 12 R/L  10 R    10 R/L 10/30   SAQ 10/18   Standing Static Balance Tandem L/R 1/2 foam 20\" 2 each 10/18   Gait mechanics  10/30   Runner's pose w/ single UE support  1 15 11/1                        Manual Intervention (22976)       Hip PROM   assessment    patellar correction of severe R lateral rotation with MFR R TFL followed with STM lateral patellar retinaculum and sustained media patellar glides Grade 3  9/22   Lumbar/SI MET  10/30            Modalities:     Pt. Education:  -pt educated on diagnosis, prognosis and expectations for rehab  -all pt questions were answered    Home Exercise Program:  Access Code: [email or web address]  URL: [email or web address]  Date: 10/11/2021  Prepared by: Martha with L LAQ 10/18  Staggered Stance Gluteal Strengthening - 1 x daily - 7 x weekly - 3 sets - 10 reps  Hip Hiking on Step - 1 x daily - 7 x weekly - 3 sets - 10 reps    Access Code: [email or web address]  URL: [email or web address]  Date: 10/07/2021  Prepared by: Lisbeth Underwood  Exercises  Lateral Step Up - 1 x daily - 7 x weekly - 3 sets - 10 reps  Clamshell with Resistance - 1 x daily - 7 x weekly - 3 sets - 10 reps  Sidelying Hip Abduction - 1 x daily - 7 x weekly - 3 sets - 10 reps  Prone Hip Extension - 1 x daily - 7 x weekly - 3 sets - 10 reps  Prone Hip Extension with Bent Knee - 1 x daily - 7 x weekly - 3 sets - 10 reps  Side Stepping with Resistance at Thighs - 1 x daily - 7 x weekly - 3 sets - 10 reps  Lateral Heel Tap - 1 x daily - 4 x weekly - 3 sets - 10 reps  Walking March - 1 x daily - 4 x weekly - 3 sets - 10 reps  Hip Abduction with Resistance Loop - 1 x daily - 4 x weekly - 2 sets - 10 reps  Hip Extension with Resistance Loop - 1 x daily - 4 x weekly - 2 sets - 10 reps  Prone Terminal Knee Extension - 1 x daily - 4 x weekly - 1 sets - 15 reps - 5 hold  Supine Active Straight Leg Raise - 1 x daily - 4 x weekly - 2 sets - 10 reps  Quadruped Fire Hydrant - 1 x daily - 4 x weekly - 2 sets - 10 reps  Beginner Front Arm Support - 1 x daily - 4 x weekly - 2 sets - 10 reps  Quadruped Hip Extension Kicks - 1 x daily - 4 x weekly - 2 sets - 10 reps    Access Code: [email or web address]  URL: [email or web address]  Date: 10/02/2021  Prepared by: Дмитрий Adair  Exercises  Hooklying Isometric Clamshell - 1 x daily - 7 x weekly - 3 sets - 10 reps  Lateral Step Up - 1 x daily - 7 x weekly - 3 sets - 10 reps  Sit to Stand - 1 x daily - 7 x weekly - 3 sets - 10 reps  Clamshell with Resistance - 1 x daily - 7 x weekly - 3 sets - 10 reps  Sidelying Hip Abduction - 1 x daily - 7 x weekly - 3 sets - 10 reps  Prone Hip Extension - 1 x daily - 7 x weekly - 3 sets - 10 reps  Prone Hip Extension with Bent Knee - 1 x daily - 7 x weekly - 3 sets - 10 reps  Modified Matteo Stretch - 1 x daily - 7 x weekly - 3 sets - 10 reps  Side Stepping with Resistance at Thighs - 1 x daily - 7 x weekly - 3 sets - 10 reps  Lateral Heel Tap - 1 x daily - 4 x weekly - 3 sets - 10 reps  Walking March - 1 x daily - 4 x weekly - 3 sets - 10 reps  Hip Abduction with Resistance Loop - 1 x daily - 4 x weekly - 2 sets - 10 reps  Hip Extension with Resistance Loop - 1 x daily - 4 x weekly - 2 sets - 10 reps      Access Code: [email or web address]  URL: [email or web address]  Date: 09/22/2021  Prepared by:  [facility]  Exercises  Hooklying Isometric Hip Abduction with Belt - 3 x daily - 7 x weekly - 10 reps - 5 hold  Supine Hip Adduction Isometric with Ball - 3 x daily - 7 x weekly - 10 reps - 5 hold  Prone Hip Extension on Table - 1 x daily - 7 x weekly - 3 sets - 10 reps  Hooklying Clamshell with Resistance - 1 x daily - 7 x weekly - 3 sets - 10 reps  Hooklying Isometric Clamshell - 1 x daily - 7 x weekly - 3 sets - 10 reps  Standing Terminal Knee Extension with Resistance - 1 x daily - 7 x weekly - 3 sets - 10 reps  Lateral Step Up - 1 x daily - 7 x weekly - 3 sets - 10 reps  Sit to Stand - 1 x daily - 7 x weekly - 3 sets - 10 reps  Standing Heel Raise - 1 x daily - 7 x weekly - 3 sets - 10 reps    Access Code: [email or web address]   URL: [email or web address]  Date: 09/20/2021  Prepared by: [facility]  Exercises  Hooklying Isometric Hip Abduction with Belt - 3 x daily - 7 x weekly - 10 reps - 5 hold  Supine Hip Adduction Isometric with Ball - 3 x daily - 7 x weekly - 10 reps - 5 hold  Reviewed HEP from hospital including ankle pumps, quad sets, glute sets, heel slides, hip abd slide and LAQ    Therapeutic Exercise and NMR EXR  [x] (35458) Provided verbal/tactile cueing for activities related to strengthening, flexibility, endurance, ROM for improvements in LE, proximal hip, and core control with self care, mobility, lifting, ambulation. [x] (24085) Provided verbal/tactile cueing for activities related to improving balance, coordination, kinesthetic sense, posture, motor skill, proprioception  to assist with LE, proximal hip, and core control in self care, mobility, lifting, ambulation and eccentric single leg control.   [] (61544) Therapist is in constant attendance of 2 or more patients providing skilled therapy interventions, but not providing any significant amount of measurable one-on-one time to either patient, for improvements in LE, proximal hip, and core control in self care, mobility, lifting, ambulation and eccentric single leg control.      NMR and Therapeutic Activities:    [] (72643 or 06979) Provided verbal/tactile cueing for activities related to improving balance, coordination, kinesthetic sense, posture, motor skill, proprioception and motor activation to allow for proper function of core, proximal hip and LE with self care and ADLs  [] (47216) Gait Re-education- Provided training and instruction to the patient for proper LE, core and proximal hip recruitment and positioning and eccentric body weight control with ambulation re-education including up and down stairs     Home Exercise Program:    [] (06751) Reviewed/Progressed HEP activities related to strengthening, flexibility, endurance, ROM of core, proximal hip and LE for functional self-care, mobility, lifting and ambulation/stair navigation   [] (02680)Reviewed/Progressed HEP activities related to improving balance, coordination, kinesthetic sense, posture, motor skill, proprioception of core, proximal hip and LE for self care, mobility, lifting, and ambulation/stair navigation      Manual Treatments:  PROM / STM / Oscillations-Mobs:  G-I, II, III, IV (PA's, Inf., Post.)  [x] (74218) Provided manual therapy to mobilize LE, proximal hip and/or LS spine soft tissue/joints for the purpose of modulating pain, promoting relaxation,  increasing ROM, reducing/eliminating soft tissue swelling/inflammation/restriction, improving soft tissue extensibility and allowing for proper ROM for normal function with self care, mobility, lifting and ambulation. Modalities:  [] (70684) Vasopneumatic compression: Utilized vasopneumatic compression to decrease edema / swelling for the purpose of improving mobility and quad tone / recruitment which will allow for increased overall function including but not limited to self-care, transfers, ambulation, and ascending / descending stairs. Charges:  Timed Code Treatment Minutes: 40   Total Treatment Minutes: 40     [] EVAL - LOW (64498)   [] EVAL - MOD (60188)  [] EVAL - HIGH (94993)  [] RE-EVAL (81287)  [x] WP(19307) x 1     [] Ionto  [x] NMR (90682) x  1    [] Vaso  [] Manual (47181) x  1    [] Ultrasound  [x] TA x 1     [] Mech Traction (99727)  [] Aquatic Therapy x     [] ES (un) (67638):   [] Home Management Training x [] ES(attended) (62600)   [] Group:     [] Other:     GOALS:  Patient stated goal: get walking without pain and back to work and recreational activity ASAP  [x] Progressing: [] Met: [] Not Met: [] Adjusted    Therapist goals for Patient:   Short Term Goals:  To be achieved in: 2 weeks  1. Independent in HEP and progression per patient tolerance, in order to prevent re-injury. [] Progressing: [x] Met: [] Not Met: [] Adjusted  2. Patient will have a decrease in pain to facilitate improvement in movement, function, and ADLs as indicated by Functional Deficits. [] Progressing: [x] Met: [] Not Met: [] Adjusted    Long Term Goals: To be achieved in: 6 weeks  1. Disability index score of 30% or less for the LEFS to assist with reaching prior level of function. [x] Progressing: [] Met: [] Not Met: [] Adjusted  2. Patient will demonstrate increased AROM to hip flexion to 110 degrees to allow for proper joint functioning as indicated by patients Functional Deficits. [] Progressing: [x] Met: [] Not Met: [] Adjusted  3. Patient will demonstrate an increase in Strength to at least 4+ as well as good proximal hip strength and control to allow for proper functional mobility as indicated by patients Functional Deficits. [x] Progressing: [] Met: [] Not Met: [] Adjusted  4. Patient will return to functional activities including walking community distances without AD without increased symptoms or restriction, or limp (PATIENT TOLERATING MORE TIME ON FEET BUT STILL HAS NOTICEABLE GAIT DEVIATION DUE TO FUNCTIONAL LEG LENGTH DIFFERENCE)  [x] Progressing: [] Met: [] Not Met: [] Adjusted  5. Patient will be able to return to full unrestricted work activity without issues or restrictions. [x] Progressing: [] Met: [] Not Met: [] Adjusted     Overall Progression Towards Functional goals/ Treatment Progress Update:  [x] Patient is progressing as expected towards functional goals listed. [] Progression is slowed due to complexities/Impairments listed. [] Progression has been slowed due to co-morbidities.   [] Plan just implemented, too soon to assess goals progression <30days   [] Goals require adjustment due to lack of progress  [] Patient is not progressing as expected and requires

## 2021-11-04 ENCOUNTER — HOSPITAL ENCOUNTER (OUTPATIENT)
Dept: PHYSICAL THERAPY | Age: 56
Setting detail: THERAPIES SERIES
Discharge: HOME | End: 2021-11-04
Payer: COMMERCIAL

## 2021-11-04 PROCEDURE — 97110 THERAPEUTIC EXERCISES: CPT

## 2021-11-04 PROCEDURE — 97112 NEUROMUSCULAR REEDUCATION: CPT

## 2021-11-04 PROCEDURE — 97140 MANUAL THERAPY 1/> REGIONS: CPT

## 2021-11-04 NOTE — FLOWSHEET NOTE
OBJECTIVE:    11/4: Continued Mild L innominate upslip, R Pubic downslip, R mld outflare; tight R hip flexors  10/30: Strength:  R hip flexion 4/5, R ABD 4/5, R hip ext 4/5. Mal-alignment present in pelvis: sig pubic downslip, L outflare & mild upslip innom. Gait: independent without AD, pt tends to protract pelvis with some IR of hip in swing and stance phase and has mild glut med lag in activation which with the functional leg length difference makes her lurch in stance phase. 10/18Holmes Jaramillo, ROL   10/11: Strength: QL L 4-/5 R 4/5; gluteus medius L 4+/5, R 4/5; SLS 15 on Airex R; challneged with neutral spine with deadlifts on R and actication of L QL with gait  10/7 - wide SOPHIA with gait, when observed, the patient narrows SOPHIA and equalizes stride length  9/20: R innominate downslip, [id number]/FRSL, ROL sacral torsion, L SI posterior innominate, R SI anterior innominate SI    RESTRICTIONS/PRECAUTIONS: No SLR until 10/5.  DOS 9/7    Exercises/Interventions:   Therapeutic Exercise (37067)  Resistance / level Sets/time Reps Notes / Cues   BIKE  4'  10/30   SL leg press 70# 3 15 L 10/30   Standing Quad  10/7   1/2 Kneeling HF Stretch airex 15'' 3 R 10/7   EOT HSS  10/7   SLR Flexion  1/10 pulses 3 R/L 10/7  10/30 done after MET   Qped SLR Ext 10/7   PVC across LB for input   Qped Glute Kick 10/7  PVC across LB for input    Total Gym B LE squat  R Single leg  SLS with opp Hip AB 2  2  1 10  10  15 L/R Bridge w/ hip ADD/Ball squeeze  2 15 10/30 done after MET   Sidelying Posterior gluteus Medius SLR AB  2 10 R/L 10/30 done after MET          Therapeutic Activities (95888)       Lateral side stepping blue 10 steps 3 laps     10/2 - added   TRX 3-point excursion  1 10x R in stance 10/2 - added                 Neuromuscular Re-ed (79322)            L11   L11      1x  2 min     10/7 - added      Added 10/5  Added 10/5 - cueing for posterior weight shift    Sport cord walking - 4 way 10/30 cues to take larger equal steps Bridge w/ LLE LAQ  5'' 10 10/7    45# 2 10 R 10/7   KB Deadlift 10# KB 1 10 R/L 10/30 cues to let standing knee bend slightly and keep pelvis more level   Airex  Tandem L/R  R SLS   1   12 L/R 10/11   8\" step-ups with R LE with L LE tap on 6\" step (on side)  1 15 10/11   BOSU (blue up) -Fwd lunge hold  -Fwd Step up to SLS from lunge   1  1    1 12 R/L  10 R    10 R/L 10/30   SAQ 10/18   Standing Static Balance Tandem L/R 1/2 foam 20\" 2 each 10/18   Gait mechanics  10/30   Runner's pose w/ single UE support  1 15 11/1   LAQwith Ball Squeeze   3# 2 10 R  11/4   Standing with green  Band pull into Add chair squats  2 10 11/4   Standing with lime green band pull into add with L LE taps FWD/BKWD  Lat L 2  2 10  10 11/4   Manual Intervention (58610)       Hip PROM   assessment    patellar correction of severe R lateral rotation with MFR R TFL followed with STM lateral patellar retinaculum and sustained media patellar glides Grade 3  9/22   Lumbar/SI MET  10/30            Modalities:     Pt. Education:  -pt educated on diagnosis, prognosis and expectations for rehab  -all pt questions were answered    Home Exercise Program:  Access Code: [email or web address]  URL: [email or web address]  Date: 10/11/2021  Prepared by: Martha with L LAQ 10/18  Staggered Stance Gluteal Strengthening - 1 x daily - 7 x weekly - 3 sets - 10 reps  Hip Hiking on Step - 1 x daily - 7 x weekly - 3 sets - 10 reps    Access Code: [email or web address]  URL: [email or web address]  Date: 10/07/2021  Prepared by: Rosa Wheeler  Exercises  Lateral Step Up - 1 x daily - 7 x weekly - 3 sets - 10 reps  Clamshell with Resistance - 1 x daily - 7 x weekly - 3 sets - 10 reps  Sidelying Hip Abduction - 1 x daily - 7 x weekly - 3 sets - 10 reps  Prone Hip Extension - 1 x daily - 7 x weekly - 3 sets - 10 reps  Prone Hip Extension with Bent Knee - 1 x daily - 7 x weekly - 3 sets - 10 reps  Side Stepping with Resistance at Thighs - 1 x daily - 7 x weekly - 3 sets - 10 reps  Lateral Heel Tap - 1 x daily - 4 x weekly - 3 sets - 10 reps  Walking March - 1 x daily - 4 x weekly - 3 sets - 10 reps  Hip Abduction with Resistance Loop - 1 x daily - 4 x weekly - 2 sets - 10 reps  Hip Extension with Resistance Loop - 1 x daily - 4 x weekly - 2 sets - 10 reps  Prone Terminal Knee Extension - 1 x daily - 4 x weekly - 1 sets - 15 reps - 5 hold  Supine Active Straight Leg Raise - 1 x daily - 4 x weekly - 2 sets - 10 reps  Quadruped Fire Hydrant - 1 x daily - 4 x weekly - 2 sets - 10 reps  Beginner Front Arm Support - 1 x daily - 4 x weekly - 2 sets - 10 reps  Quadruped Hip Extension Kicks - 1 x daily - 4 x weekly - 2 sets - 10 reps    Access Code: [email or web address]  URL: [email or web address]  Date: 10/02/2021  Prepared by: Nancy Gasca  Exercises  Hooklying Isometric Clamshell - 1 x daily - 7 x weekly - 3 sets - 10 reps  Lateral Step Up - 1 x daily - 7 x weekly - 3 sets - 10 reps  Sit to Stand - 1 x daily - 7 x weekly - 3 sets - 10 reps  Clamshell with Resistance - 1 x daily - 7 x weekly - 3 sets - 10 reps  Sidelying Hip Abduction - 1 x daily - 7 x weekly - 3 sets - 10 reps  Prone Hip Extension - 1 x daily - 7 x weekly - 3 sets - 10 reps  Prone Hip Extension with Bent Knee - 1 x daily - 7 x weekly - 3 sets - 10 reps  Modified Matteo Stretch - 1 x daily - 7 x weekly - 3 sets - 10 reps  Side Stepping with Resistance at Thighs - 1 x daily - 7 x weekly - 3 sets - 10 reps  Lateral Heel Tap - 1 x daily - 4 x weekly - 3 sets - 10 reps  Walking March - 1 x daily - 4 x weekly - 3 sets - 10 reps  Hip Abduction with Resistance Loop - 1 x daily - 4 x weekly - 2 sets - 10 reps  Hip Extension with Resistance Loop - 1 x daily - 4 x weekly - 2 sets - 10 reps      Access Code: [email or web address]  URL: [email or web address]  Date: 09/22/2021  Prepared by:  [facility]  Exercises  Hooklying Isometric Hip Abduction with Belt - 3 x daily - 7 x weekly - 10 reps - 5 hold  Supine Hip Adduction Isometric with Ball - 3 x daily - 7 x weekly - 10 reps - 5 hold  Prone Hip Extension on Table - 1 x daily - 7 x weekly - 3 sets - 10 reps  Hooklying Clamshell with Resistance - 1 x daily - 7 x weekly - 3 sets - 10 reps  Hooklying Isometric Clamshell - 1 x daily - 7 x weekly - 3 sets - 10 reps  Standing Terminal Knee Extension with Resistance - 1 x daily - 7 x weekly - 3 sets - 10 reps  Lateral Step Up - 1 x daily - 7 x weekly - 3 sets - 10 reps  Sit to Stand - 1 x daily - 7 x weekly - 3 sets - 10 reps  Standing Heel Raise - 1 x daily - 7 x weekly - 3 sets - 10 reps    Access Code: [email or web address]   URL: "[employer]. com/  Date: 09/20/2021  Prepared by: [facility]  Exercises  Hooklying Isometric Hip Abduction with Belt - 3 x daily - 7 x weekly - 10 reps - 5 hold  Supine Hip Adduction Isometric with Ball - 3 x daily - 7 x weekly - 10 reps - 5 hold  Reviewed HEP from hospital including ankle pumps, quad sets, glute sets, heel slides, hip abd slide and LAQ    Therapeutic Exercise and NMR EXR  [x] (26731) Provided verbal/tactile cueing for activities related to strengthening, flexibility, endurance, ROM for improvements in LE, proximal hip, and core control with self care, mobility, lifting, ambulation. [x] (33738) Provided verbal/tactile cueing for activities related to improving balance, coordination, kinesthetic sense, posture, motor skill, proprioception  to assist with LE, proximal hip, and core control in self care, mobility, lifting, ambulation and eccentric single leg control.   [] (60484) Therapist is in constant attendance of 2 or more patients providing skilled therapy interventions, but not providing any significant amount of measurable one-on-one time to either patient, for improvements in LE, proximal hip, and core control in self care, mobility, lifting, ambulation and eccentric single leg control.      NMR and Therapeutic Activities:    [] (61478 or 93140) Provided verbal/tactile cueing for activities related to improving balance, coordination, kinesthetic sense, posture, motor skill, proprioception and motor activation to allow for proper function of core, proximal hip and LE with self care and ADLs  [] (09753) Gait Re-education- Provided training and instruction to the patient for proper LE, core and proximal hip recruitment and positioning and eccentric body weight control with ambulation re-education including up and down stairs     Home Exercise Program:    [] (00732) Reviewed/Progressed HEP activities related to strengthening, flexibility, endurance, ROM of core, proximal hip and LE for functional self-care, mobility, lifting and ambulation/stair navigation   [] (61536)Reviewed/Progressed HEP activities related to improving balance, coordination, kinesthetic sense, posture, motor skill, proprioception of core, proximal hip and LE for self care, mobility, lifting, and ambulation/stair navigation      Manual Treatments:  PROM / STM / Oscillations-Mobs:  G-I, II, III, IV (PA's, Inf., Post.)  [x] (09516) Provided manual therapy to mobilize LE, proximal hip and/or LS spine soft tissue/joints for the purpose of modulating pain, promoting relaxation,  increasing ROM, reducing/eliminating soft tissue swelling/inflammation/restriction, improving soft tissue extensibility and allowing for proper ROM for normal function with self care, mobility, lifting and ambulation. Modalities:  [] (87431) Vasopneumatic compression: Utilized vasopneumatic compression to decrease edema / swelling for the purpose of improving mobility and quad tone / recruitment which will allow for increased overall function including but not limited to self-care, transfers, ambulation, and ascending / descending stairs.        Charges:  Timed Code Treatment Minutes: 50   Total Treatment Minutes: 50     [] EVAL - LOW (03618)   [] EVAL - MOD (95895)  [] EVAL - HIGH (56844)  [] RE-EVAL (88269)  [x] RR(86816) x 1     [] Ionto  [x] NMR (17232) x  1    [] Vaso  [x] Manual (72959) x  1    [] Ultrasound  [] TA x 1     [] Mech Traction (76767)  [] Aquatic Therapy x     [] ES (un) (38205):   [] Home Management Training x [] ES(attended) (36320)   [] Group:     [] Other:     GOALS:  Patient stated goal: get walking without pain and back to work and recreational activity ASAP  [x] Progressing: [] Met: [] Not Met: [] Adjusted    Therapist goals for Patient:   Short Term Goals: To be achieved in: 2 weeks  1. Independent in HEP and progression per patient tolerance, in order to prevent re-injury. [] Progressing: [x] Met: [] Not Met: [] Adjusted  2. Patient will have a decrease in pain to facilitate improvement in movement, function, and ADLs as indicated by Functional Deficits. [] Progressing: [x] Met: [] Not Met: [] Adjusted    Long Term Goals: To be achieved in: 6 weeks  1. Disability index score of 30% or less for the LEFS to assist with reaching prior level of function. [x] Progressing: [] Met: [] Not Met: [] Adjusted  2. Patient will demonstrate increased AROM to hip flexion to 110 degrees to allow for proper joint functioning as indicated by patients Functional Deficits. [] Progressing: [x] Met: [] Not Met: [] Adjusted  3. Patient will demonstrate an increase in Strength to at least 4+ as well as good proximal hip strength and control to allow for proper functional mobility as indicated by patients Functional Deficits. [x] Progressing: [] Met: [] Not Met: [] Adjusted  4. Patient will return to functional activities including walking community distances without AD without increased symptoms or restriction, or limp (PATIENT TOLERATING MORE TIME ON FEET BUT STILL HAS NOTICEABLE GAIT DEVIATION DUE TO FUNCTIONAL LEG LENGTH DIFFERENCE)  [x] Progressing: [] Met: [] Not Met: [] Adjusted  5. Patient will be able to return to full unrestricted work activity without issues or restrictions.      [x] Progressing: [] Met: [] Not Met: [] Adjusted     Overall Progression Towards Functional goals/ Treatment Progress Update:  [x] Patient is progressing as expected towards functional goals listed. [] Progression is slowed due to complexities/Impairments listed. [] Progression has been slowed due to co-morbidities. [] Plan just implemented, too soon to assess goals progression <30days   [] Goals require adjustment due to lack of progress  [] Patient is not progressing as expected and requires additional follow up with physician  [] Other    Persisting Functional Limitations/Impairments:  []Sitting [x]Standing   [x]Walking [x]Stairs   []Transfers [x]ADLs   [x]Squatting/bending [x]Kneeling  [x]Housework [x]Job related tasks  []Driving [x]Sports/Recreation   [x]Sleeping []Other:    ASSESSMENT:  Pt tolerated therapy without discomfort with manual /MET to correct pelvic postural malalignments as listed above. Focused this date on activating L hip adductors to improve neutral hip position and pubic downslip with combined hip Add/CKC movements. Patient continues to progress slowly but states she has been walking with better heel to toe gait at times. Pt to cont progressions as tolerated and cont to benefit from skilled PT. Treatment/Activity Tolerance:  [x] Pt able to complete treatment [] Patient limited by fatique  [] Patient limited by pain  [] Patient limited by other medical complications  [] Other:     Prognosis: [x] Good [] Fair  [] Poor    Patient Requires Follow-up: [x] Yes  [] No    PLAN: PT 1-2x / week for 6 weeks. 10/30 realized pt was over her authorized visits and submitted request for more Francia Jonas  [x] Continue per plan of care [] Alter current plan (see comments)  [] Plan of care initiated [] Hold pending MD visit [] Discharge    Electronically signed by: Jocelin Ocasio PT /MSPT    Note: If patient does not return for scheduled/ recommended follow up visits, this note will serve as a discharge from care along with most recent update on progress.

## 2021-11-09 ENCOUNTER — HOSPITAL ENCOUNTER (OUTPATIENT)
Dept: PHYSICAL THERAPY | Age: 56
Setting detail: THERAPIES SERIES
Discharge: HOME | End: 2021-11-09
Payer: COMMERCIAL

## 2021-11-09 PROCEDURE — 97530 THERAPEUTIC ACTIVITIES: CPT

## 2021-11-09 PROCEDURE — 97110 THERAPEUTIC EXERCISES: CPT

## 2021-11-09 NOTE — FLOWSHEET NOTE
s/p, she is progressing well, but she wants to push herself to improve more. OBJECTIVE:    11/9: 22deg ER, 42deg IR, 42deg IR, 52deg ER L  11/4: Continued Mild L innominate upslip, R Pubic downslip, R mld outflare; tight R hip flexors  10/30: Strength:  R hip flexion 4/5, R ABD 4/5, R hip ext 4/5. Mal-alignment present in pelvis: sig pubic downslip, L outflare & mild upslip innom. Gait: independent without AD, pt tends to protract pelvis with some IR of hip in swing and stance phase and has mild glut med lag in activation which with the functional leg length difference makes her lurch in stance phase. 10/18Talmadge Darter, ROL   10/11: Strength: QL L 4-/5 R 4/5; gluteus medius L 4+/5, R 4/5; SLS 15 on Airex R; challneged with neutral spine with deadlifts on R and actication of L QL with gait  10/7 - wide SOPHIA with gait, when observed, the patient narrows SOPHIA and equalizes stride length  9/20: R innominate downslip, [id number]/FRSL, ROL sacral torsion, L SI posterior innominate, R SI anterior innominate SI    RESTRICTIONS/PRECAUTIONS: No SLR until 10/5.  DOS 9/7    Exercises/Interventions:   Therapeutic Exercise (25385)  Resistance / level Sets/time Reps Notes / Cues   BIKE  10/30   SL leg press 10/30   Standing Quad  10/7   Standing HF Stretch (w/ step)  15'' 3 R 11/9    1/2 Kneeling HF Stretch 10/7   EOT HSS  10/7   SLR Flexion  10/7  10/30 done after MET   Trunk-supported SLR Ext 4# 2 10 R 11/9 - ^ weight, superset   Trunk-supported Glute Kick 4# 2 10 R 11/9 - ^ weight, superset       Total Gym Bridge w/ hip ADD/Ball squeeze  2 15 10/30 done after MET   Sidelying Posterior gluteus Medius SLR AB  2 10 R/L 10/30 done after MET   Clamshells blue 2 25 R 11/9 - resumed   Reverse Clamshells blue 2 Fatigue  11/9 - added   Hooklying Piriformis (IR/ER) - gentle!  30'' 1x each, R 11/9 - added - GENTLE   Hip Hikes 8'' 2 fatigue 11/9 - patient did not fatigue 2nd set   SLR Abd @ wall  2 10, R/L 11/9 - added          Therapeutic Activities (65831)       Lateral side stepping TRX 3-point excursion TG S/L Single leg Squats  3 10 11/9 - added   TM Walk 1%, 2.3-2.5  4.5 min 11/9 - added   SB Ball Squats (sustained)  10'' 6 11/9 - added          Neuromuscular Re-ed (62684)            10/7 - added      Added 10/5  Added 10/5 - cueing for posterior weight shift    Sport cord walking - 4 way 10/30 cues to take larger equal steps   Bridge w/ LLE LAQ 10/7   KB Deadlift 10# KB 2 10 R   11/9 - R only; added set; TE this date   8\" step-ups with R LE with L LE tap on 6\" step (on side) BOSU (blue up) SAQ Standing Static Balance Gait mechanics Runner's pose w/ single UE support Angel-Boggs   Standing with green  Band pull into Add chair squats Standing with lime green band pull into add with L LE taps        Manual Intervention (86123)       Hip PROM   assessment   Lumbar/SI MET  10/30            Modalities:     Pt. Education:  -pt educated on diagnosis, prognosis and expectations for rehab  -all pt questions were answered    Home Exercise Program:  Access Code: [email or web address]  URL: [email or web address]  Date: 10/11/2021  Prepared by: Martha with L LAQ 10/18  Staggered Stance Gluteal Strengthening - 1 x daily - 7 x weekly - 3 sets - 10 reps  Hip Hiking on Step - 1 x daily - 7 x weekly - 3 sets - 10 reps    Access Code: [email or web address]  URL: [email or web address]  Date: 10/07/2021  Prepared by: [employer]  Exercises  Lateral Step Up - 1 x daily - 7 x weekly - 3 sets - 10 reps  Clamshell with Resistance - 1 x daily - 7 x weekly - 3 sets - 10 reps  Sidelying Hip Abduction - 1 x daily - 7 x weekly - 3 sets - 10 reps  Prone Hip Extension - 1 x daily - 7 x weekly - 3 sets - 10 reps  Prone Hip Extension with Bent Knee - 1 x daily - 7 x weekly - 3 sets - 10 reps  Side Stepping with Resistance at Thighs - 1 x daily - 7 x weekly - 3 sets - 10 reps  Lateral Heel Tap - 1 x daily - 4 x weekly - 3 sets - 10 reps  Walking March - 1 x weekly - 3 sets - 10 reps  Hooklying Clamshell with Resistance - 1 x daily - 7 x weekly - 3 sets - 10 reps  Hooklying Isometric Clamshell - 1 x daily - 7 x weekly - 3 sets - 10 reps  Standing Terminal Knee Extension with Resistance - 1 x daily - 7 x weekly - 3 sets - 10 reps  Lateral Step Up - 1 x daily - 7 x weekly - 3 sets - 10 reps  Sit to Stand - 1 x daily - 7 x weekly - 3 sets - 10 reps  Standing Heel Raise - 1 x daily - 7 x weekly - 3 sets - 10 reps    Access Code: [email or web address]   URL: [email or web address]  Date: 09/20/2021  Prepared by: [facility]  Exercises  Hooklying Isometric Hip Abduction with Belt - 3 x daily - 7 x weekly - 10 reps - 5 hold  Supine Hip Adduction Isometric with Ball - 3 x daily - 7 x weekly - 10 reps - 5 hold  Reviewed HEP from hospital including ankle pumps, quad sets, glute sets, heel slides, hip abd slide and LAQ    Therapeutic Exercise and NMR EXR  [x] (24678) Provided verbal/tactile cueing for activities related to strengthening, flexibility, endurance, ROM for improvements in LE, proximal hip, and core control with self care, mobility, lifting, ambulation. [x] (27046) Provided verbal/tactile cueing for activities related to improving balance, coordination, kinesthetic sense, posture, motor skill, proprioception  to assist with LE, proximal hip, and core control in self care, mobility, lifting, ambulation and eccentric single leg control.   [] (62307) Therapist is in constant attendance of 2 or more patients providing skilled therapy interventions, but not providing any significant amount of measurable one-on-one time to either patient, for improvements in LE, proximal hip, and core control in self care, mobility, lifting, ambulation and eccentric single leg control.      NMR and Therapeutic Activities:    [] (48127 or 01882) Provided verbal/tactile cueing for activities related to improving balance, coordination, kinesthetic sense, posture, motor skill, proprioception and Therapy x     [] ES (un) [phone]):   [] Home Management Training x [] ES(attended) (86971)   [] Group:     [] Other:     GOALS:  Patient stated goal: get walking without pain and back to work and recreational activity ASAP  [x] Progressing: [] Met: [] Not Met: [] Adjusted    Therapist goals for Patient:   Short Term Goals: To be achieved in: 2 weeks  1. Independent in HEP and progression per patient tolerance, in order to prevent re-injury. [] Progressing: [x] Met: [] Not Met: [] Adjusted  2. Patient will have a decrease in pain to facilitate improvement in movement, function, and ADLs as indicated by Functional Deficits. [] Progressing: [x] Met: [] Not Met: [] Adjusted    Long Term Goals: To be achieved in: 6 weeks  1. Disability index score of 30% or less for the LEFS to assist with reaching prior level of function. [x] Progressing: [] Met: [] Not Met: [] Adjusted  2. Patient will demonstrate increased AROM to hip flexion to 110 degrees to allow for proper joint functioning as indicated by patients Functional Deficits. [] Progressing: [x] Met: [] Not Met: [] Adjusted  3. Patient will demonstrate an increase in Strength to at least 4+ as well as good proximal hip strength and control to allow for proper functional mobility as indicated by patients Functional Deficits. [x] Progressing: [] Met: [] Not Met: [] Adjusted  4. Patient will return to functional activities including walking community distances without AD without increased symptoms or restriction, or limp (PATIENT TOLERATING MORE TIME ON FEET BUT STILL HAS NOTICEABLE GAIT DEVIATION DUE TO FUNCTIONAL LEG LENGTH DIFFERENCE)  [x] Progressing: [] Met: [] Not Met: [] Adjusted  5. Patient will be able to return to full unrestricted work activity without issues or restrictions.      [x] Progressing: [] Met: [] Not Met: [] Adjusted     Overall Progression Towards Functional goals/ Treatment Progress Update:  [x] Patient is progressing as expected towards functional goals listed. [] Progression is slowed due to complexities/Impairments listed. [] Progression has been slowed due to co-morbidities. [] Plan just implemented, too soon to assess goals progression <30days   [] Goals require adjustment due to lack of progress  [] Patient is not progressing as expected and requires additional follow up with physician  [] Other    Persisting Functional Limitations/Impairments:  []Sitting [x]Standing   [x]Walking [x]Stairs   []Transfers [x]ADLs   [x]Squatting/bending [x]Kneeling  [x]Housework [x]Job related tasks  []Driving [x]Sports/Recreation   [x]Sleeping []Other:    ASSESSMENT:  The patient tolerated all exercises and interventions without pain this date, and fatigue on only a few exercises (clamshells & reverse clamshells). The patient's gait is improving in hip extension on R, advancement on RLE, but she still compensates with rotation of L hip/pelvis around R hip for lack of R hip extension/strength. The patient is progressing well at 9 weeks s/p PAULETTE. Treatment/Activity Tolerance:  [x] Pt able to complete treatment [] Patient limited by fatique  [] Patient limited by pain  [] Patient limited by other medical complications  [] Other:     Prognosis: [x] Good [] Fair  [] Poor    Patient Requires Follow-up: [x] Yes  [] No    PLAN: PT 1-2x / week for 6 weeks. 10/30 realized pt was over her authorized visits and submitted request for more Velton Grate  [x] Continue per plan of care [] Alter current plan (see comments)  [] Plan of care initiated [] Hold pending MD visit [] Discharge    Electronically signed by: Girma Hutchinson, PT, DPT, OMT-C    Note: If patient does not return for scheduled/ recommended follow up visits, this note will serve as a discharge from care along with most recent update on progress.

## 2021-11-13 ENCOUNTER — HOSPITAL ENCOUNTER (OUTPATIENT)
Dept: PHYSICAL THERAPY | Age: 56
Setting detail: THERAPIES SERIES
Discharge: HOME | End: 2021-11-13
Payer: COMMERCIAL

## 2021-11-13 PROCEDURE — 97110 THERAPEUTIC EXERCISES: CPT

## 2021-11-13 PROCEDURE — 97112 NEUROMUSCULAR REEDUCATION: CPT

## 2021-11-13 NOTE — FLOWSHEET NOTE
[facility]  Phone: [phone]   Fax: [phone]    Physical Therapy Treatment Note/ Progress Report:     Date:  2021    Patient Name:  Shantal Julien    :  1965  MRN: [id number]  Restrictions/Precautions:    Medical/Treatment Diagnosis Information:   Diagnosis: M16.11 (ICD-10-CM) - Primary osteoarthritis of right hip S/P 21   Treatment Diagnosis: Decreased R hip ROM, strength, muscle activation, flexibility and pain s/p R PAULETTE with limitations interfering with correct gait mechanics, ADL's, IADL's, recreational and work activity. Insurance/Certification information:  PT Insurance Information: [employer] (Tom Gross needed)  Physician Information:  Referring Practitioner: Cedric Hernández MD  Plan of care signed (Y/N): [x]  Yes  []  No     Date of Patient follow up with Physician:      Progress Report: []  Yes -recert   [x]  No     Date Range for reporting period:   Beginnin/8  PN:   Recert:    Ending: -    Progress report due (10 Rx/or 30 days whichever is less): visit #32     Recertification due (POC duration/ or 90 days whichever is less):     Visit # Insurance Allowable Auth required? Date Range   Eval +   17 60 [x]  Yes - COHERE  []  No -21-22       Visits approved Visits  used Date Range   12  10   12  4      - 21-22       Latex Allergy:  [x]NO      []YES  Preferred Language for Healthcare:   [x]English       []other:    Functional Scale:        Date assessed:  LEFS: raw score = 0; dysfunction = 100%     LEFS: raw score = 42; dysfunction = 47.5%  10/11  LEFS: raw score=47; dysfunction=41%   10/30    Pain level:  2-/10     SUBJECTIVE: The patient admits not doing as much at home because her work schedule was ~75 hours this past week with being on call and other meetings. She overall feels more achiness  and stiffness after short periods of immobility.    OBJECTIVE:    : L innominate upslip, R SI posterior innominate L SI anterior innominate  11/9: 22deg ER, 42deg IR, 42deg IR, 52deg ER L  11/4: Continued Mild L innominate upslip, R Pubic downslip, R mld outflare; tight R hip flexors  10/30: Strength:  R hip flexion 4/5, R ABD 4/5, R hip ext 4/5. Mal-alignment present in pelvis: sig pubic downslip, L outflare & mild upslip innom. Gait: independent without AD, pt tends to protract pelvis with some IR of hip in swing and stance phase and has mild glut med lag in activation which with the functional leg length difference makes her lurch in stance phase. 10/18Tisa Kika, ROL   10/11: Strength: QL L 4-/5 R 4/5; gluteus medius L 4+/5, R 4/5; SLS 15 on Airex R; challneged with neutral spine with deadlifts on R and actication of L QL with gait  10/7 - wide SOPHIA with gait, when observed, the patient narrows SOPHIA and equalizes stride length  9/20: R innominate downslip, [id number]/FRSL, ROL sacral torsion, L SI posterior innominate, R SI anterior innominate SI    RESTRICTIONS/PRECAUTIONS: No SLR until 10/5.  DOS 9/7    Exercises/Interventions:   Therapeutic Exercise (48940)  Resistance / level Sets/time Reps Notes / Cues   BIKE  10/30   SL leg press 10/30   Standing Quad  10/7   Standing HF Stretch (w/ step)  15'' 3 R 11/9    1/2 Kneeling HF Stretch 10/7   EOT HSS  10/7   SLR Flexion  10/7  10/30 done after MET   Trunk-supported SLR Ext 4# 2 10 R, L 11/9 - ^ weight, superset; NMR   Trunk-supported Glute Kick 4# 2 10 R, ;L 11/9 - ^ weight, superset; NMR       Total Gym Leg press B Squats 115# 2 15 11/13; NMR   Bridge w/ hip ADD/Ball squeeze  2 15 10/30 done after MET   Sidelying Posterior gluteus Medius SLR AB  2 10 R/L 10/30 done after MET   Clamshells blue 2 25 R with increased knee ext 11/9 - resumed   Reverse Clamshells Blue loop 2 Fatigue  11/9 - added   Hooklying Piriformis (IR/ER) - gentle!  30'' 1x each, R 11/9 - added - GENTLE   Hip Hikes 8'' 2 fatigue 11/9 - patient did not fatigue 2nd set SLR Abd @ wall  2 10, R/L 11/9 - added   L hip flexor stretch with R knee flex to TKE \"pigeons\"  2 10 11/13   Therapeutic Activities (58471)       Lateral side stepping TRX 3-point excursion 1 10x R in stance TG S/L Single leg Squats  3 10 11/9 - added   TM Walk 1%, 2.3-2.5  4.5 min 11/9 - added   SB Ball Squats (sustained)  10'' 6 11/9 - added          Neuromuscular Re-ed (32849)            10/7 - added      Added 10/5  Added 10/5 - cueing for posterior weight shift    Sport cord walking - 4 way 10/30 cues to take larger equal steps   Bridge w/ LLE LAQ 10/7   KB Deadlift 10# KB 2 10 R   11/9 - R only; added set; TE this date   8\" step-ups with R LE with L LE tap on 6\" step (on side) BOSU (blue up) SAQ Standing Static Balance Gait mechanics Runner's pose w/ single UE support 1 15 LAQwith Ball Squeeze   Standing with green  Band pull into Add chair squats Standing with lime green band pull into add with L LE taps Hands/knees multifidus pillow under knee   1 30 L/R 11/13   Butt Burners R Hip   Hip AB  Hip flexion  Hip ext  Knee flexion 1 15 all 11/13   Hookline pelvic wiggle R hip ext and L hip flexion 5\" 10 11/13   Manual Intervention (21375)       Hip PROM   assessment   Lumbar/SI MET [id number], R posterior innominate SI, LOL sacral torsion, L SI anterior innominate S5    L LA disrraction  4 passes       Modalities:     Pt. Education:  -pt educated on diagnosis, prognosis and expectations for rehab  -all pt questions were answered    Home Exercise Program:  Access Code: [email or web address]  URL: [email or web address]  Date: 10/11/2021  Prepared by: Martha BERNAL LAQ 10/18  Staggered Stance Gluteal Strengthening - 1 x daily - 7 x weekly - 3 sets - 10 reps  Hip Hiking on Step - 1 x daily - 7 x weekly - 3 sets - 10 reps    Access Code: [email or web address]  URL: [email or web address]  Date: 10/07/2021  Prepared by: Moreno Camejo  Exercises  Lateral Step Up - 1 x daily - 7 x weekly - 3 sets - 10 reps  Clamshell with Resistance - 1 x daily - 7 x weekly - 3 sets - 10 reps  Sidelying Hip Abduction - 1 x daily - 7 x weekly - 3 sets - 10 reps  Prone Hip Extension - 1 x daily - 7 x weekly - 3 sets - 10 reps  Prone Hip Extension with Bent Knee - 1 x daily - 7 x weekly - 3 sets - 10 reps  Side Stepping with Resistance at Thighs - 1 x daily - 7 x weekly - 3 sets - 10 reps  Lateral Heel Tap - 1 x daily - 4 x weekly - 3 sets - 10 reps  Walking March - 1 x daily - 4 x weekly - 3 sets - 10 reps  Hip Abduction with Resistance Loop - 1 x daily - 4 x weekly - 2 sets - 10 reps  Hip Extension with Resistance Loop - 1 x daily - 4 x weekly - 2 sets - 10 reps  Prone Terminal Knee Extension - 1 x daily - 4 x weekly - 1 sets - 15 reps - 5 hold  Supine Active Straight Leg Raise - 1 x daily - 4 x weekly - 2 sets - 10 reps  Quadruped Fire Hydrant - 1 x daily - 4 x weekly - 2 sets - 10 reps  Beginner Front Arm Support - 1 x daily - 4 x weekly - 2 sets - 10 reps  Quadruped Hip Extension Kicks - 1 x daily - 4 x weekly - 2 sets - 10 reps    Access Code: [email or web address]  URL: [email or web address]  Date: 10/02/2021  Prepared by: Madalyn Centeno  Exercises  Hooklying Isometric Clamshell - 1 x daily - 7 x weekly - 3 sets - 10 reps  Lateral Step Up - 1 x daily - 7 x weekly - 3 sets - 10 reps  Sit to Stand - 1 x daily - 7 x weekly - 3 sets - 10 reps  Clamshell with Resistance - 1 x daily - 7 x weekly - 3 sets - 10 reps  Sidelying Hip Abduction - 1 x daily - 7 x weekly - 3 sets - 10 reps  Prone Hip Extension - 1 x daily - 7 x weekly - 3 sets - 10 reps  Prone Hip Extension with Bent Knee - 1 x daily - 7 x weekly - 3 sets - 10 reps  Modified Matteo Stretch - 1 x daily - 7 x weekly - 3 sets - 10 reps  Side Stepping with Resistance at Thighs - 1 x daily - 7 x weekly - 3 sets - 10 reps  Lateral Heel Tap - 1 x daily - 4 x weekly - 3 sets - 10 reps  Walking March - 1 x daily - 4 x weekly - 3 sets - 10 reps  Hip Abduction with Resistance Loop - 1 x daily - 4 x weekly - 2 sets - 10 reps  Hip Extension with Resistance Loop - 1 x daily - 4 x weekly - 2 sets - 10 reps      Access Code: [email or web address]  URL: [email or web address]  Date: 09/22/2021  Prepared by: [facility]  Exercises  Hooklying Isometric Hip Abduction with Belt - 3 x daily - 7 x weekly - 10 reps - 5 hold  Supine Hip Adduction Isometric with Ball - 3 x daily - 7 x weekly - 10 reps - 5 hold  Prone Hip Extension on Table - 1 x daily - 7 x weekly - 3 sets - 10 reps  Hooklying Clamshell with Resistance - 1 x daily - 7 x weekly - 3 sets - 10 reps  Hooklying Isometric Clamshell - 1 x daily - 7 x weekly - 3 sets - 10 reps  Standing Terminal Knee Extension with Resistance - 1 x daily - 7 x weekly - 3 sets - 10 reps  Lateral Step Up - 1 x daily - 7 x weekly - 3 sets - 10 reps  Sit to Stand - 1 x daily - 7 x weekly - 3 sets - 10 reps  Standing Heel Raise - 1 x daily - 7 x weekly - 3 sets - 10 reps    Access Code: [email or web address]   URL: [email or web address]  Date: 09/20/2021  Prepared by: [facility]  Exercises  Hooklying Isometric Hip Abduction with Belt - 3 x daily - 7 x weekly - 10 reps - 5 hold  Supine Hip Adduction Isometric with Ball - 3 x daily - 7 x weekly - 10 reps - 5 hold  Reviewed HEP from hospital including ankle pumps, quad sets, glute sets, heel slides, hip abd slide and LAQ    Therapeutic Exercise and NMR EXR  [x] (15158) Provided verbal/tactile cueing for activities related to strengthening, flexibility, endurance, ROM for improvements in LE, proximal hip, and core control with self care, mobility, lifting, ambulation.   [x] (19048) Provided verbal/tactile cueing for activities related to improving balance, coordination, kinesthetic sense, posture, motor skill, proprioception  to assist with LE, proximal hip, and core control in self care, mobility, lifting, ambulation and eccentric single leg control.   [] (04118) Therapist is in constant attendance of 2 or more patients providing skilled therapy interventions, but not providing any significant amount of measurable one-on-one time to either patient, for improvements in LE, proximal hip, and core control in self care, mobility, lifting, ambulation and eccentric single leg control. NMR and Therapeutic Activities:    [] (76853 or 61662) Provided verbal/tactile cueing for activities related to improving balance, coordination, kinesthetic sense, posture, motor skill, proprioception and motor activation to allow for proper function of core, proximal hip and LE with self care and ADLs  [] (38490) Gait Re-education- Provided training and instruction to the patient for proper LE, core and proximal hip recruitment and positioning and eccentric body weight control with ambulation re-education including up and down stairs     Home Exercise Program:    [] (32802) Reviewed/Progressed HEP activities related to strengthening, flexibility, endurance, ROM of core, proximal hip and LE for functional self-care, mobility, lifting and ambulation/stair navigation   [] (94308)Reviewed/Progressed HEP activities related to improving balance, coordination, kinesthetic sense, posture, motor skill, proprioception of core, proximal hip and LE for self care, mobility, lifting, and ambulation/stair navigation      Manual Treatments:  PROM / STM / Oscillations-Mobs:  G-I, II, III, IV (PA's, Inf., Post.)  [x] (73948) Provided manual therapy to mobilize LE, proximal hip and/or LS spine soft tissue/joints for the purpose of modulating pain, promoting relaxation,  increasing ROM, reducing/eliminating soft tissue swelling/inflammation/restriction, improving soft tissue extensibility and allowing for proper ROM for normal function with self care, mobility, lifting and ambulation.      Modalities:  [] (18459) Vasopneumatic compression: Utilized vasopneumatic compression to decrease edema / swelling for the purpose of improving mobility and quad tone / recruitment which will allow for increased overall function including but not limited to self-care, transfers, ambulation, and ascending / descending stairs. Charges:  Timed Code Treatment Minutes: 46   Total Treatment Minutes: 46     [] EVAL - LOW (50710)   [] EVAL - MOD (41973)  [] EVAL - HIGH (33977)  [] RE-EVAL (40886)  [x] UR(82080) x 1     [] Ionto  [x] NMR (86576) x  2   [] Vaso  [] Manual (53168) x      [] Ultrasound  [] TA x 1     [] Mech Traction (48739)  [] Aquatic Therapy x     [] ES (un) (09755):   [] Home Management Training x [] ES(attended) (72795)   [] Group:     [] Other:     GOALS:  Patient stated goal: get walking without pain and back to work and recreational activity ASAP  [x] Progressing: [] Met: [] Not Met: [] Adjusted    Therapist goals for Patient:   Short Term Goals: To be achieved in: 2 weeks  1. Independent in HEP and progression per patient tolerance, in order to prevent re-injury. [] Progressing: [x] Met: [] Not Met: [] Adjusted  2. Patient will have a decrease in pain to facilitate improvement in movement, function, and ADLs as indicated by Functional Deficits. [] Progressing: [x] Met: [] Not Met: [] Adjusted    Long Term Goals: To be achieved in: 6 weeks  1. Disability index score of 30% or less for the LEFS to assist with reaching prior level of function. [x] Progressing: [] Met: [] Not Met: [] Adjusted  2. Patient will demonstrate increased AROM to hip flexion to 110 degrees to allow for proper joint functioning as indicated by patients Functional Deficits. [] Progressing: [x] Met: [] Not Met: [] Adjusted  3. Patient will demonstrate an increase in Strength to at least 4+ as well as good proximal hip strength and control to allow for proper functional mobility as indicated by patients Functional Deficits. [x] Progressing: [] Met: [] Not Met: [] Adjusted  4.  Patient will return to functional activities including walking community distances without AD without increased symptoms or restriction, or limp (PATIENT TOLERATING MORE TIME ON FEET BUT STILL HAS NOTICEABLE GAIT DEVIATION DUE TO FUNCTIONAL LEG LENGTH DIFFERENCE)  [x] Progressing: [] Met: [] Not Met: [] Adjusted  5. Patient will be able to return to full unrestricted work activity without issues or restrictions. [x] Progressing: [] Met: [] Not Met: [] Adjusted     Overall Progression Towards Functional goals/ Treatment Progress Update:  [x] Patient is progressing as expected towards functional goals listed. [] Progression is slowed due to complexities/Impairments listed. [] Progression has been slowed due to co-morbidities. [] Plan just implemented, too soon to assess goals progression <30days   [] Goals require adjustment due to lack of progress  [] Patient is not progressing as expected and requires additional follow up with physician  [] Other    Persisting Functional Limitations/Impairments:  []Sitting [x]Standing   [x]Walking [x]Stairs   []Transfers [x]ADLs   [x]Squatting/bending [x]Kneeling  [x]Housework [x]Job related tasks  []Driving [x]Sports/Recreation   [x]Sleeping []Other:    ASSESSMENT:  The patient tolerated all exercises and interventions without pain this date, with fatigue on functional CKC R  LE posterior chain/core exercises with compensations at times with lumbopelvic rotation. . The patient's gait is improving in hip extension on R, advancement on RLE, but she still compensates with rotation of L hip/pelvis around R hip for lack of R hip extension/strength. Able to add in leg press this date. Noted overall posture of L innominate upslip, R SI posterior innominate L SI anterior innominate but improved with L=R Leg length after PT,  The patient is progressing well at 9 weeks s/p PAULETTE.     Treatment/Activity Tolerance:  [x] Pt able to complete treatment [] Patient limited by fatique  [] Patient limited by pain  [] Patient limited by other medical complications  [] Other:     Prognosis: [x] Good [] Fair  [] Poor    Patient Requires Follow-up: [x] Yes  [] No    PLAN: PT 1-2x / week for 6 weeks. 10/30 realized pt was over her authorized visits and submitted request for more Kathy Henry  [x] Continue per plan of care [] Alter current plan (see comments)  [] Plan of care initiated [] Hold pending MD visit [] Discharge    Electronically signed by: Lorelei Bermeo, PT, MSPT, OMT-C    Note: If patient does not return for scheduled/ recommended follow up visits, this note will serve as a discharge from care along with most recent update on progress.

## 2021-11-15 ENCOUNTER — HOSPITAL ENCOUNTER (OUTPATIENT)
Dept: PHYSICAL THERAPY | Age: 56
Setting detail: THERAPIES SERIES
Discharge: HOME | End: 2021-11-15
Payer: COMMERCIAL

## 2021-11-15 PROCEDURE — 97112 NEUROMUSCULAR REEDUCATION: CPT

## 2021-11-15 PROCEDURE — 97110 THERAPEUTIC EXERCISES: CPT

## 2021-11-15 NOTE — FLOWSHEET NOTE
[facility]  Phone: [phone]   Fax: [phone]    Physical Therapy Treatment Note/ Progress Report:     Date:  11/15/2021    Patient Name:  Puma Dowd    :  1965  MRN: [id number]  Restrictions/Precautions:    Medical/Treatment Diagnosis Information:   Diagnosis: M16.11 (ICD-10-CM) - Primary osteoarthritis of right hip S/P 21   Treatment Diagnosis: Decreased R hip ROM, strength, muscle activation, flexibility and pain s/p R PAULETTE with limitations interfering with correct gait mechanics, ADL's, IADL's, recreational and work activity. Insurance/Certification information:  PT Insurance Information: [employer] (Jarek Arvizu needed)  Physician Information:  Referring Practitioner: Ahsan Gomez MD  Plan of care signed (Y/N): [x]  Yes  []  No     Date of Patient follow up with Physician:      Progress Report: []  Yes    []  No     Date Range for reporting period:   Beginnin/8  PN: ,   Recert:    Ending: -    Progress report due (10 Rx/or 30 days whichever is less): visit #26     Recertification due (POC duration/ or 90 days whichever is less):     Visit # Insurance Allowable Auth required? Date Range   Eval +   18 60 [x]  Yes - COHERE  []  No -21-22       Visits approved Visits  used Date Range   12  10   12  5      - 21-22       Latex Allergy:  [x]NO      []YES  Preferred Language for Healthcare:   [x]English       []other:    Functional Scale:        Date assessed:  LEFS: raw score = 0; dysfunction = 100%     LEFS: raw score = 42; dysfunction = 47.5%  10/11  LEFS: raw score=47; dysfunction=41%   10/30    Pain level:  2-4/10     SUBJECTIVE: The patient reports she is feeling much better overall with overall body soreness and stiffness this date. She states she was able to increase bridges both legs progressing to 1 leg with improved height and control.      OBJECTIVE:    : L innominate upslip, R SI posterior innominate L SI anterior innominate  11/9: 22deg ER, 42deg IR, 42deg IR, 52deg ER L  11/4: Continued Mild L innominate upslip, R Pubic downslip, R mld outflare; tight R hip flexors  10/30: Strength:  R hip flexion 4/5, R ABD 4/5, R hip ext 4/5. Mal-alignment present in pelvis: sig pubic downslip, L outflare & mild upslip innom. Gait: independent without AD, pt tends to protract pelvis with some IR of hip in swing and stance phase and has mild glut med lag in activation which with the functional leg length difference makes her lurch in stance phase. RESTRICTIONS/PRECAUTIONS: No SLR until 10/5.  DOS 9/7    Exercises/Interventions:   Therapeutic Exercise (35037)  Resistance / level Sets/time Reps Notes / Cues   BIKE  10/30   SL leg press 10/30   Standing Quad  10/7   Standing HF Stretch (w/ step)  15'' 3 R 11/9 1/2 Kneeling HF Stretch 10/7   EOT HSS  10/7   SLR Flexion  10/7  10/30 done after MET   Trunk-supported SLR Ext 4# 2 10 R, L 11/9 - ^ weight, superset; NMR   Trunk-supported Glute Kick 4# 2 10 R, ;L 11/9 - ^ weight, superset; NMR       Total Gym Leg press B Squats 115#  Unilateral L/R 55# 2  1 15  20 11/13; 11/15: ^ unilateralNMR          Bridge w/ hip ADD/Ball squeeze  2 10 10/30 done after MET   Sidelying Posterior gluteus Medius SLR AB  2 10 R/L 10/30 done after MET   Clamshells blue 2 25 R with increased knee ext 11/9 - resumed   Reverse Clamshells Blue loop 2 Fatigue  11/9 - added   Hooklying Piriformis (IR/ER) - gentle!  30'' 1x each, R 11/9 - added - GENTLE   Hip Hikes 8'' 2 fatigue 11/9 - patient did not fatigue 2nd set   SLR Abd @ wall  2 10, R/L 11/9 - added   L hip flexor stretch with R knee flex to TKE \"pigeons\"  2 10 11/13   Therapeutic Activities (42359)       Lateral side stepping TRX 3-point excursion 1 10x R in stance TG S/L Single leg Squats  3 10 11/9 - added   TM Walk 1%, 2.3-2.5  4.5 min 11/9 - added    RxEye (sustained) With pelvic tilt (p.T.)  B GH flexion with P.T. [address] with P.T. 10'' 10 each 11/9 - added; 11/15^ advanced with addition of posterior pelvic tilt          Neuromuscular Re-ed (55447)            10/7 - added      Added 10/5  Added 10/5 - cueing for posterior weight shift    Sport cord walking - 4 way 10/30 cues to take larger equal steps   Bridge w/ LLE LAQ with ball squeeze 5'' 8 L/R 10/7   KB Deadlift 10# KB 1 10 R   11/9 - R only; added set; TE this date   8\" step-ups with R LE with L LE tap on 6\" step (on side) BOSU (blue up) SAQ Standing Static Balance Gait mechanics Runner's pose w/ single UE support 1 15 LAQwith Ball Squeeze   Standing with green  Band pull into Add chair squats Standing with lime green band pull into add with L LE taps Hands/Knees progression opp UE    Opp LE    opp UU/LE unable 1    1     10    5 11/15   Prone alternating knee flexion with neutral spine  1 20 11/15   Captain Shea's  L Shoulder/arm at wall with R SLS  10\" 5 11/16          Hands/knees multifidus pillow under knee   1 30 L/R 11/13   Sideplanks Modified  Full length 1/10\" R and L(L only 3)  R and L (R only 2) 11/15   Butt Burners R Hip   Hip AB  Hip flexion  Hip ext  Knee flexion 1 15 all 11/13   Hookline pelvic wiggle R hip ext and L hip flexion 5\" 10 11/13   Manual Intervention (69350)       Hip PROM   assessment   Lumbar/SI MET R posterior innominate SI, LOL sacral torsion, L SI anterior innominate S5'    STM/MFR Therabar R TFL/quad proximal, deep STM to same area             L LA disrraction  4 passes       Modalities:     Pt. Education:  -pt educated on diagnosis, prognosis and expectations for rehab  -all pt questions were answered    Home Exercise Program:  Access Code: [email or web address]  URL: [email or web address]  Date: 10/11/2021  Prepared by:  Martha with L LAQ 10/18  Staggered Stance Gluteal Strengthening - 1 x daily - 7 x weekly - 3 sets - 10 reps  Hip Hiking on Step - 1 x daily - 7 x weekly - 3 sets - 10 reps    Access Code: [email or web address]  URL: [email or web address]  Date: 10/07/2021  Prepared by: Gosia Joseph  Exercises  Lateral Step Up - 1 x daily - 7 x weekly - 3 sets - 10 reps  Clamshell with Resistance - 1 x daily - 7 x weekly - 3 sets - 10 reps  Sidelying Hip Abduction - 1 x daily - 7 x weekly - 3 sets - 10 reps  Prone Hip Extension - 1 x daily - 7 x weekly - 3 sets - 10 reps  Prone Hip Extension with Bent Knee - 1 x daily - 7 x weekly - 3 sets - 10 reps  Side Stepping with Resistance at Thighs - 1 x daily - 7 x weekly - 3 sets - 10 reps  Lateral Heel Tap - 1 x daily - 4 x weekly - 3 sets - 10 reps  Walking March - 1 x daily - 4 x weekly - 3 sets - 10 reps  Hip Abduction with Resistance Loop - 1 x daily - 4 x weekly - 2 sets - 10 reps  Hip Extension with Resistance Loop - 1 x daily - 4 x weekly - 2 sets - 10 reps  Prone Terminal Knee Extension - 1 x daily - 4 x weekly - 1 sets - 15 reps - 5 hold  Supine Active Straight Leg Raise - 1 x daily - 4 x weekly - 2 sets - 10 reps  Quadruped Fire Hydrant - 1 x daily - 4 x weekly - 2 sets - 10 reps  Beginner Front Arm Support - 1 x daily - 4 x weekly - 2 sets - 10 reps  Quadruped Hip Extension Kicks - 1 x daily - 4 x weekly - 2 sets - 10 reps    Access Code: [email or web address]  URL: [email or web address]/  Date: 10/02/2021  Prepared by: Gosia Joseph  Exercises  Hooklying Isometric Clamshell - 1 x daily - 7 x weekly - 3 sets - 10 reps  Lateral Step Up - 1 x daily - 7 x weekly - 3 sets - 10 reps  Sit to Stand - 1 x daily - 7 x weekly - 3 sets - 10 reps  Clamshell with Resistance - 1 x daily - 7 x weekly - 3 sets - 10 reps  Sidelying Hip Abduction - 1 x daily - 7 x weekly - 3 sets - 10 reps  Prone Hip Extension - 1 x daily - 7 x weekly - 3 sets - 10 reps  Prone Hip Extension with Bent Knee - 1 x daily - 7 x weekly - 3 sets - 10 reps  Modified Matteo Stretch - 1 x daily - 7 x weekly - 3 sets - 10 reps  Side Stepping with Resistance at Thighs - 1 x daily - 7 x weekly - 3 sets - 10 reps  Lateral Heel Tap - 1 x daily - 4 x weekly - 3 sets - 10 reps  Walking March - 1 x daily - 4 x weekly - 3 sets - 10 reps  Hip Abduction with Resistance Loop - 1 x daily - 4 x weekly - 2 sets - 10 reps  Hip Extension with Resistance Loop - 1 x daily - 4 x weekly - 2 sets - 10 reps      Access Code: [email or web address]  URL: [email or web address]/  Date: 09/22/2021  Prepared by: [facility] [employer]  Exercises  Hooklying Isometric Hip Abduction with Belt - 3 x daily - 7 x weekly - 10 reps - 5 hold  Supine Hip Adduction Isometric with Ball - 3 x daily - 7 x weekly - 10 reps - 5 hold  Prone Hip Extension on Table - 1 x daily - 7 x weekly - 3 sets - 10 reps  Hooklying Clamshell with Resistance - 1 x daily - 7 x weekly - 3 sets - 10 reps  Hooklying Isometric Clamshell - 1 x daily - 7 x weekly - 3 sets - 10 reps  Standing Terminal Knee Extension with Resistance - 1 x daily - 7 x weekly - 3 sets - 10 reps  Lateral Step Up - 1 x daily - 7 x weekly - 3 sets - 10 reps  Sit to Stand - 1 x daily - 7 x weekly - 3 sets - 10 reps  Standing Heel Raise - 1 x daily - 7 x weekly - 3 sets - 10 reps    Access Code: [email or web address]   URL: [email or web address]  Date: 09/20/2021  Prepared by: [facility] [employer]  Exercises  Hooklying Isometric Hip Abduction with Belt - 3 x daily - 7 x weekly - 10 reps - 5 hold  Supine Hip Adduction Isometric with Ball - 3 x daily - 7 x weekly - 10 reps - 5 hold  Reviewed HEP from hospital including ankle pumps, quad sets, glute sets, heel slides, hip abd slide and LAQ    Therapeutic Exercise and NMR EXR  [x] (43565) Provided verbal/tactile cueing for activities related to strengthening, flexibility, endurance, ROM for improvements in LE, proximal hip, and core control with self care, mobility, lifting, ambulation.   [x] (82025) Provided verbal/tactile cueing for activities related to improving balance, coordination, kinesthetic sense, posture, motor skill, proprioception  to assist with LE, proximal hip, and core control in self care, mobility, lifting, ambulation and eccentric single leg control.   [] (07937) Therapist is in constant attendance of 2 or more patients providing skilled therapy interventions, but not providing any significant amount of measurable one-on-one time to either patient, for improvements in LE, proximal hip, and core control in self care, mobility, lifting, ambulation and eccentric single leg control. NMR and Therapeutic Activities:    [x] (74446 or 44775) Provided verbal/tactile cueing for activities related to improving balance, coordination, kinesthetic sense, posture, motor skill, proprioception and motor activation to allow for proper function of core, proximal hip and LE with self care and ADLs  [] (38789) Gait Re-education- Provided training and instruction to the patient for proper LE, core and proximal hip recruitment and positioning and eccentric body weight control with ambulation re-education including up and down stairs     Home Exercise Program:    [] (90495) Reviewed/Progressed HEP activities related to strengthening, flexibility, endurance, ROM of core, proximal hip and LE for functional self-care, mobility, lifting and ambulation/stair navigation   [] (99291)Reviewed/Progressed HEP activities related to improving balance, coordination, kinesthetic sense, posture, motor skill, proprioception of core, proximal hip and LE for self care, mobility, lifting, and ambulation/stair navigation      Manual Treatments:  PROM / STM / Oscillations-Mobs:  G-I, II, III, IV (PA's, Inf., Post.)  [x] (43381) Provided manual therapy to mobilize LE, proximal hip and/or LS spine soft tissue/joints for the purpose of modulating pain, promoting relaxation,  increasing ROM, reducing/eliminating soft tissue swelling/inflammation/restriction, improving soft tissue extensibility and allowing for proper ROM for normal function with self care, mobility, lifting and ambulation.      Modalities:  [] (93600) Vasopneumatic compression: Utilized vasopneumatic compression to decrease edema / swelling for the purpose of improving mobility and quad tone / recruitment which will allow for increased overall function including but not limited to self-care, transfers, ambulation, and ascending / descending stairs. Charges:  Timed Code Treatment Minutes: 46   Total Treatment Minutes: 46     [] EVAL - LOW (97337)   [] EVAL - MOD (68896)  [] EVAL - HIGH (02951)  [] RE-EVAL (63207)  [x] AU(08371) x 1     [] Ionto  [x] NMR (26295) x  2   [] Vaso  [] Manual (61664) x      [] Ultrasound  [] TA x 1     [] Mech Traction (46872)  [] Aquatic Therapy x     [] ES (un) (39064):   [] Home Management Training x [] ES(attended) (90288)   [] Group:     [] Other:     GOALS:  Patient stated goal: get walking without pain and back to work and recreational activity ASAP  [x] Progressing: [] Met: [] Not Met: [] Adjusted    Therapist goals for Patient:   Short Term Goals: To be achieved in: 2 weeks  1. Independent in HEP and progression per patient tolerance, in order to prevent re-injury. [] Progressing: [x] Met: [] Not Met: [] Adjusted  2. Patient will have a decrease in pain to facilitate improvement in movement, function, and ADLs as indicated by Functional Deficits. [] Progressing: [x] Met: [] Not Met: [] Adjusted    Long Term Goals: To be achieved in: 6 weeks  1. Disability index score of 30% or less for the LEFS to assist with reaching prior level of function. [x] Progressing: [] Met: [] Not Met: [] Adjusted  2. Patient will demonstrate increased AROM to hip flexion to 110 degrees to allow for proper joint functioning as indicated by patients Functional Deficits. [] Progressing: [x] Met: [] Not Met: [] Adjusted  3. Patient will demonstrate an increase in Strength to at least 4+ as well as good proximal hip strength and control to allow for proper functional mobility as indicated by patients Functional Deficits.    [x] Progressing: [] Met: [] Not Met: [] Adjusted  4. Patient will return to functional activities including walking community distances without AD without increased symptoms or restriction, or limp (PATIENT TOLERATING MORE TIME ON FEET BUT STILL HAS NOTICEABLE GAIT DEVIATION DUE TO FUNCTIONAL LEG LENGTH DIFFERENCE)  [x] Progressing: [] Met: [] Not Met: [] Adjusted  5. Patient will be able to return to full unrestricted work activity without issues or restrictions. [x] Progressing: [] Met: [] Not Met: [] Adjusted     Overall Progression Towards Functional goals/ Treatment Progress Update:  [x] Patient is progressing as expected towards functional goals listed. [] Progression is slowed due to complexities/Impairments listed. [] Progression has been slowed due to co-morbidities. [] Plan just implemented, too soon to assess goals progression <30days   [] Goals require adjustment due to lack of progress  [] Patient is not progressing as expected and requires additional follow up with physician  [] Other    Persisting Functional Limitations/Impairments:  []Sitting [x]Standing   [x]Walking [x]Stairs   []Transfers [x]ADLs   [x]Squatting/bending [x]Kneeling  [x]Housework [x]Job related tasks  []Driving [x]Sports/Recreation   [x]Sleeping []Other:    ASSESSMENT:  The patient tolerated all exercises and interventions without pain this date, with fatigue on functional CKC R  LE posterior chain/core exercises with compensations at times with lumbopelvic rotation. . Patient able to perform RDLs better today fter first recruiting lower abs/core with pelvic tilts at wall . The patient's gait is improving in hip extension on R, advancement on RLE, but she still compensates with rotation of L hip/pelvis around R hip for lack of R hip extension/strength. Able to progress with leg press this date.   Noted overall posture of L innominate upslip, R SI posterior innominate L SI anterior innominate but improved with L=R Leg length after PT, Moderate myofascial restrictions in R Proximal TFL/quad due to sx and decreased activation of posterior chain with walking. The patient is progressing well at 9 weeks s/p PAULETTE. Treatment/Activity Tolerance:  [x] Pt able to complete treatment [] Patient limited by fatique  [] Patient limited by pain  [] Patient limited by other medical complications  [] Other:     Prognosis: [x] Good [] Fair  [] Poor    Patient Requires Follow-up: [x] Yes  [] No    PLAN: PT 1-2x / week for 6 weeks. 10/30 realized pt was over her authorized visits and submitted request for more Cassi Lopez  [x] Continue per plan of care [] Alter current plan (see comments)  [] Plan of care initiated [] Hold pending MD visit [] Discharge    Electronically signed by: Yolette Krueger, PT, MSPT, OMT-C    Note: If patient does not return for scheduled/ recommended follow up visits, this note will serve as a discharge from care along with most recent update on progress.

## 2021-11-19 ENCOUNTER — HOSPITAL ENCOUNTER (OUTPATIENT)
Dept: PHYSICAL THERAPY | Age: 56
Setting detail: THERAPIES SERIES
Discharge: HOME | End: 2021-11-19
Payer: COMMERCIAL

## 2021-11-19 PROCEDURE — 97112 NEUROMUSCULAR REEDUCATION: CPT

## 2021-11-19 PROCEDURE — 97530 THERAPEUTIC ACTIVITIES: CPT

## 2021-11-19 PROCEDURE — 97110 THERAPEUTIC EXERCISES: CPT

## 2021-11-19 NOTE — FLOWSHEET NOTE
[facility]  Phone: [phone]   Fax: [phone]    Physical Therapy Treatment Note/ Progress Report:     Date:  2021    Patient Name:  Rogerio Zuñiga    :  1965  MRN: [id number]  Restrictions/Precautions:    Medical/Treatment Diagnosis Information:   Diagnosis: M16.11 (ICD-10-CM) - Primary osteoarthritis of right hip S/P 21   Treatment Diagnosis: Decreased R hip ROM, strength, muscle activation, flexibility and pain s/p R PAULETTE with limitations interfering with correct gait mechanics, ADL's, IADL's, recreational and work activity. Insurance/Certification information:  PT Insurance Information: [employer] ([place]andro Lorenzanalla needed)  Physician Information:  Referring Practitioner: Roddy Dasilva MD  Plan of care signed (Y/N): [x]  Yes  []  No     Date of Patient follow up with Physician:      Progress Report: []  Yes    [x]  No     Date Range for reporting period:   Beginnin/8  PN: 4815,   Recert: 10/86   Ending: -    Progress report due (10 Rx/or 30 days whichever is less): visit #09    Recertification due (POC duration/ or 90 days whichever is less):     Visit # Insurance Allowable Auth required? Date Range   Eval +   19 60 pcy [x]  Yes - COH[place]  []  No   21 - 22       Visits approved Visits  used Date Range      10     6       21 - 22       Latex Allergy:  [x]NO      []YES  Preferred Language for Healthcare:   [x]English       []other:    Functional Scale:        Date assessed:  LEFS: raw score = 0; dysfunction = 100%     LEFS: raw score = 42; dysfunction = 47.5%  10/11  LEFS: raw score=47; dysfunction=41%   10/30    Pain level:  2-4/10     SUBJECTIVE: The patient reports she is still experiencing soreness, discomfort and stiffness in R hip. This is fairly constant and noticeable throughout the day. She reports she performs hip flexor stretch intermittently through the day in attempt to decrease symptoms.  She reports her leg length discrepancy is improving, but slowly. She finds walking with increased arm swing helps normalize gait and reduce her \"lurch\". OBJECTIVE:    11/13: L innominate upslip, R SI posterior innominate L SI anterior innominate   11/9: 22deg ER, 42deg IR, 42deg IR, 52deg ER L  11/4: Continued Mild L innominate upslip, R Pubic downslip, R mld outflare; tight R hip flexors  10/30: Strength:  R hip flexion 4/5, R ABD 4/5, R hip ext 4/5. Mal-alignment present in pelvis: sig pubic downslip, L outflare & mild upslip innom. Gait: independent without AD, pt tends to protract pelvis with some IR of hip in swing and stance phase and has mild glut med lag in activation which with the functional leg length difference makes her lurch in stance phase. RESTRICTIONS/PRECAUTIONS: DOS 9/7    Exercises/Interventions:   Therapeutic Exercise (94413)  Resistance / level Sets/time Reps Notes / Cues   BIKE  10/30   SL leg press 10/30   Standing Quad  10/7   Standing HF Stretch (w/ step)  15'' 3 R 11/19 - v.c. to rock onto ball of R foot, allow knee to flex gently    1/2 Kneeling HF Stretch 10/7   EOT HSS  10/7   SLR Flexion  10/7  10/30 done after MET   Trunk-supported SLR Ext 11/9 - ^ weight, superset; NMR   Trunk-supported Glute Kick 11/9 - ^ weight, superset; [facility]       Total Gym Leg press B Squats 115#  Unilateral L/R 70# 2  1 15  20 11/13; 11/15: ^ unilateral NMR  11/19 - ^ weight next visit for DL; ^ weight for SL this date          Bridge w/ hip ADD/Ball squeeze Sidelying Posterior gluteus Medius SLR AB  10/30 done after MET   Clamshells 11/9 - resumed   Reverse Clamshells 11/9 - added   Hooklying Piriformis (IR/ER) - gentle!   SLR Abd @ wall  L hip flexor stretch with R knee flex to TKE \"pigeons\"         Therapeutic Activities (48033)       Lateral side stepping TRX 3-point excursion TM Walk 2.5%, 2.5mph  5 min 11/19- progressed    [employer] (sustained[employer] 15kg 2 10 11/19 - added daily - 7 x weekly - 3 sets - 10 reps  Prone Hip Extension - 1 x daily - 7 x weekly - 3 sets - 10 reps  Prone Hip Extension with Bent Knee - 1 x daily - 7 x weekly - 3 sets - 10 reps  Side Stepping with Resistance at Thighs - 1 x daily - 7 x weekly - 3 sets - 10 reps  Lateral Heel Tap - 1 x daily - 4 x weekly - 3 sets - 10 reps  Walking March - 1 x daily - 4 x weekly - 3 sets - 10 reps  Hip Abduction with Resistance Loop - 1 x daily - 4 x weekly - 2 sets - 10 reps  Hip Extension with Resistance Loop - 1 x daily - 4 x weekly - 2 sets - 10 reps  Prone Terminal Knee Extension - 1 x daily - 4 x weekly - 1 sets - 15 reps - 5 hold  Supine Active Straight Leg Raise - 1 x daily - 4 x weekly - 2 sets - 10 reps  Quadruped [employer]drant - 1 x daily - 4 x weekly - 2 sets - 10 reps  Beginner Front Arm Support - 1 x daily - 4 x weekly - 2 sets - 10 reps  Quadruped Hip Extension Kicks - 1 x daily - 4 x weekly - 2 sets - 10 reps    Access Code: [email or web address]  URL: [email or web address]  Date: 10/02/2021  Prepared by: Lee Ann Hawkins  Exercises  Hooklying Isometric Clamshell - 1 x daily - 7 x weekly - 3 sets - 10 reps  Lateral Step Up - 1 x daily - 7 x weekly - 3 sets - 10 reps  Sit to Stand - 1 x daily - 7 x weekly - 3 sets - 10 reps  Clamshell with Resistance - 1 x daily - 7 x weekly - 3 sets - 10 reps  Sidelying Hip Abduction - 1 x daily - 7 x weekly - 3 sets - 10 reps  Prone Hip Extension - 1 x daily - 7 x weekly - 3 sets - 10 reps  Prone Hip Extension with Bent Knee - 1 x daily - 7 x weekly - 3 sets - 10 reps  Modified Matteo Stretch - 1 x daily - 7 x weekly - 3 sets - 10 reps  Side Stepping with Resistance at Thighs - 1 x daily - 7 x weekly - 3 sets - 10 reps  Lateral Heel Tap - 1 x daily - 4 x weekly - 3 sets - 10 reps  Walking March - 1 x daily - 4 x weekly - 3 sets - 10 reps  Hip Abduction with Resistance Loop - 1 x daily - 4 x weekly - 2 sets - 10 reps  Hip Extension with Resistance Loop - 1 x daily - 4 x weekly - 2 sets - 10 reps      Access Code: [email or web address]  URL: [email or web address]  Date: 09/22/2021  Prepared by: [facility]  Exercises  Hooklying Isometric Hip Abduction with Belt - 3 x daily - 7 x weekly - 10 reps - 5 hold  Supine Hip Adduction Isometric with Ball - 3 x daily - 7 x weekly - 10 reps - 5 hold  Prone Hip Extension on Table - 1 x daily - 7 x weekly - 3 sets - 10 reps  Hooklying Clamshell with Resistance - 1 x daily - 7 x weekly - 3 sets - 10 reps  Hooklying Isometric Clamshell - 1 x daily - 7 x weekly - 3 sets - 10 reps  Standing Terminal Knee Extension with Resistance - 1 x daily - 7 x weekly - 3 sets - 10 reps  Lateral Step Up - 1 x daily - 7 x weekly - 3 sets - 10 reps  Sit to Stand - 1 x daily - 7 x weekly - 3 sets - 10 reps  Standing Heel Raise - 1 x daily - 7 x weekly - 3 sets - 10 reps    Access Code: [email or web address]   URL: [email or web address]  Date: 09/20/2021  Prepared by: [facility]  Exercises  Hooklying Isometric Hip Abduction with Belt - 3 x daily - 7 x weekly - 10 reps - 5 hold  Supine Hip Adduction Isometric with Ball - 3 x daily - 7 x weekly - 10 reps - 5 hold  Reviewed HEP from hospital including ankle pumps, quad sets, glute sets, heel slides, hip abd slide and LAQ    Therapeutic Exercise and NMR EXR  [x] (26008) Provided verbal/tactile cueing for activities related to strengthening, flexibility, endurance, ROM for improvements in LE, proximal hip, and core control with self care, mobility, lifting, ambulation.   [x] (66786) Provided verbal/tactile cueing for activities related to improving balance, coordination, kinesthetic sense, posture, motor skill, proprioception  to assist with LE, proximal hip, and core control in self care, mobility, lifting, ambulation and eccentric single leg control.   [] (07189) Therapist is in constant attendance of 2 or more patients providing skilled therapy interventions, but not providing any significant amount of measurable one-on-one time to either patient, for improvements in LE, proximal hip, and core control in self care, mobility, lifting, ambulation and eccentric single leg control. NMR and Therapeutic Activities:    [x] (70447 or 89291) Provided verbal/tactile cueing for activities related to improving balance, coordination, kinesthetic sense, posture, motor skill, proprioception and motor activation to allow for proper function of core, proximal hip and LE with self care and ADLs  [] (79721) Gait Re-education- Provided training and instruction to the patient for proper LE, core and proximal hip recruitment and positioning and eccentric body weight control with ambulation re-education including up and down stairs     Home Exercise Program:    [] (19057) Reviewed/Progressed HEP activities related to strengthening, flexibility, endurance, ROM of core, proximal hip and LE for functional self-care, mobility, lifting and ambulation/stair navigation   [] (62087)Reviewed/Progressed HEP activities related to improving balance, coordination, kinesthetic sense, posture, motor skill, proprioception of core, proximal hip and LE for self care, mobility, lifting, and ambulation/stair navigation      Manual Treatments:  PROM / STM / Oscillations-Mobs:  G-I, II, III, IV (PA's, Inf., Post.)  [] (93700) Provided manual therapy to mobilize LE, proximal hip and/or LS spine soft tissue/joints for the purpose of modulating pain, promoting relaxation,  increasing ROM, reducing/eliminating soft tissue swelling/inflammation/restriction, improving soft tissue extensibility and allowing for proper ROM for normal function with self care, mobility, lifting and ambulation.      Modalities:  [] (72812) Vasopneumatic compression: Utilized vasopneumatic compression to decrease edema / swelling for the purpose of improving mobility and quad tone / recruitment which will allow for increased overall function including but not limited to self-care, transfers, ambulation, and ascending / descending stairs. Charges:  Timed Code Treatment Minutes: 44   Total Treatment Minutes: 44     [] EVAL - LOW (36781)   [] EVAL - MOD (89164)  [] EVAL - HIGH (20851)  [] RE-EVAL (75396)  [x] FH(69445) x 1     [] Ionto  [x] NMR (32667) x 1   [] Vaso  [] Manual (19823) x      [] Ultrasound  [x] TA x 1     [] Mech Traction (27578)  [] Aquatic Therapy x     [] ES (un) (55357):   [] Home Management Training x [] ES(attended) (36013)   [] Group:     [] Other:     GOALS:  Patient stated goal: get walking without pain and back to work and recreational activity ASAP  [x] Progressing: [] Met: [] Not Met: [] Adjusted    Therapist goals for Patient:   Short Term Goals: To be achieved in: 2 weeks  1. Independent in HEP and progression per patient tolerance, in order to prevent re-injury. [] Progressing: [x] Met: [] Not Met: [] Adjusted  2. Patient will have a decrease in pain to facilitate improvement in movement, function, and ADLs as indicated by Functional Deficits. [] Progressing: [x] Met: [] Not Met: [] Adjusted    Long Term Goals: To be achieved in: 6 weeks  1. Disability index score of 30% or less for the LEFS to assist with reaching prior level of function. [x] Progressing: [] Met: [] Not Met: [] Adjusted  2. Patient will demonstrate increased AROM to hip flexion to 110 degrees to allow for proper joint functioning as indicated by patients Functional Deficits. [] Progressing: [x] Met: [] Not Met: [] Adjusted  3. Patient will demonstrate an increase in Strength to at least 4+ as well as good proximal hip strength and control to allow for proper functional mobility as indicated by patients Functional Deficits. [x] Progressing: [] Met: [] Not Met: [] Adjusted  4.  Patient will return to functional activities including walking community distances without AD without increased symptoms or restriction, or limp (PATIENT TOLERATING MORE TIME ON FEET BUT STILL HAS NOTICEABLE GAIT DEVIATION DUE TO FUNCTIONAL LEG LENGTH DIFFERENCE)  [x] Progressing: [] Met: [] Not Met: [] Adjusted  5. Patient will be able to return to full unrestricted work activity without issues or restrictions. [x] Progressing: [] Met: [] Not Met: [] Adjusted     Overall Progression Towards Functional goals/ Treatment Progress Update:  [x] Patient is progressing as expected towards functional goals listed. [] Progression is slowed due to complexities/Impairments listed. [] Progression has been slowed due to co-morbidities. [] Plan just implemented, too soon to assess goals progression <30days   [] Goals require adjustment due to lack of progress  [] Patient is not progressing as expected and requires additional follow up with physician  [] Other    Persisting Functional Limitations/Impairments:  []Sitting [x]Standing   [x]Walking [x]Stairs   []Transfers [x]ADLs   [x]Squatting/bending [x]Kneeling  [x]Housework [x]Job related tasks  []Driving [x]Sports/Recreation   [x]Sleeping []Other:    ASSESSMENT:  The patient was challenged by several exercises today, marked by discomfort in R anterior hip or by compensatory pelvic rotation. This was mildly reduced with rest, but symptoms continued at tolerable levels. The patient progressed leg press resistance without issue. Her gait still continues to improve slowly, marked by improvement in R hip extension in terminal stance and advancement of the RLE in swing. She fatigues with repeated compound LE movements, indicating ongoing need for resisted strengthening, endurance training and hip stability activities in therapy. Treatment/Activity Tolerance:  [x] Pt able to complete treatment [] Patient limited by fatique  [] Patient limited by pain  [] Patient limited by other medical complications  [] Other:     Prognosis: [x] Good [] Fair  [] Poor    Patient Requires Follow-up: [x] Yes  [] No    PLAN: PT 1-2x / week for 6 weeks.   [x] Continue per plan of care [] Alter current plan (see comments)  [] Plan of care initiated [] Hold pending MD visit [] Discharge    Electronically signed by: Joana Palmer PT, DPT, OMT-C    Note: If patient does not return for scheduled/ recommended follow up visits, this note will serve as a discharge from care along with most recent update on progress.

## 2021-11-22 ENCOUNTER — HOSPITAL ENCOUNTER (OUTPATIENT)
Dept: PHYSICAL THERAPY | Age: 56
Setting detail: THERAPIES SERIES
Discharge: HOME | End: 2021-11-22
Payer: COMMERCIAL

## 2021-11-22 PROCEDURE — 97110 THERAPEUTIC EXERCISES: CPT

## 2021-11-22 PROCEDURE — 97112 NEUROMUSCULAR REEDUCATION: CPT

## 2021-11-22 PROCEDURE — 97530 THERAPEUTIC ACTIVITIES: CPT

## 2021-11-22 NOTE — FLOWSHEET NOTE
[facility]  Phone: [phone]   Fax: [phone]    Physical Therapy Treatment Note/ Progress Report:     Date:  2021    Patient Name:  Tariq León    :  1965  MRN: [id number]  Restrictions/Precautions:    Medical/Treatment Diagnosis Information:   Diagnosis: M16.11 (ICD-10-CM) - Primary osteoarthritis of right hip S/P 21   Treatment Diagnosis: Decreased R hip ROM, strength, muscle activation, flexibility and pain s/p R PAULETTE with limitations interfering with correct gait mechanics, ADL's, IADL's, recreational and work activity. Insurance/Certification information:  PT Insurance Information: [employer] (Jonathon Breen needed)  Physician Information:  Referring Practitioner: Devante Hoffmann MD  Plan of care signed (Y/N): [x]  Yes  []  No     Date of Patient follow up with Physician:      Progress Report: []  Yes    [x]  No     Date Range for reporting period:   Beginnin/8  PN: /10,   Recert:    Ending: -    Progress report due (10 Rx/or 30 days whichever is less): visit #26    Recertification due (POC duration/ or 90 days whichever is less):     Visit # Insurance Allowable Auth required? Date Range   Eval +   20 61 pcy [x]  Yes - COHERE  []  No   21 - 22       Visits approved Visits  used Date Range      10     7       21 - 22       Latex Allergy:  [x]NO      []YES  Preferred Language for Healthcare:   [x]English       []other:    Functional Scale:        Date assessed:  LEFS: raw score = 0; dysfunction = 100%     LEFS: raw score = 42; dysfunction = 47.5%  10/11  LEFS: raw score=47; dysfunction=41%   10/30    Pain level:  2-4/10      SUBJECTIVE: The patient is 11 weeks s/p this date. The patient was able to walk 4 mile walk this  in [place], some trail. She comments she felt more comfortable with the trail portion than she used to. She reports typical soreness, and stiffness.  She had DOMS Hamstring  Deadlift Walk  Cross & Touch  Lateral Band Walk - straight  Lateral Band Walk - squatted                         Blue - laces  Blue - laces    Approximately 1 lap of each, unless patient expressed desire to perform more   11/22 - added   Lateral side stepping TRX 3-point excursion TM Walk  Nimbuzz (sustained) Landmine Bar Squats TRX Retro Lunge  1 10 R/L 11/22 - added   TRX Curtsey Lunge (facing laterally)  1 10 R/L 11/22 - added          Neuromuscular Re-ed (88028)            10/7 - added      Added 10/5  Added 10/5 - cueing for posterior weight shift    Bridge w/ LLE LAQ with ball squeeze KB Deadlift 8\" step-ups with R LE with L LE tap on 6\" step (on side) BOSU (blue up) SAQ Standing Static Balance Gait mechanics Runner's pose w/ single UE support 1 15 LAQwith Ball Squeeze   Standing with green  Band pull into Add chair squats Standing with lime green band pull into add with L LE taps Hands/Knees progression - \"Birddogs\"  1   10 11/22    Prone alternating knee flexion with neutral spine  Captain Shabbir's  L Shoulder/arm at wall with R SLS         Hands/knees multifidus pillow under knee   11/13   Sideplanks Butt Burners Hookline pelvic wiggle R hip ext and L hip flexion 5\"  2 x 5 11/13 - added  11/22 - no hold this date   Prone Modified Plank + Glute Kicks         Manual Intervention (99653)       Hip PROM   assessment   Lumbar/SI MET    STM/MFR        L LA disrraction      Modalities:     Pt. Education:  -pt educated on diagnosis, prognosis and expectations for rehab  -all pt questions were answered    Home Exercise Program:  Access Code: [email or web address]  URL: [email or web address]  Date: 10/11/2021  Prepared by: Martha BERNAL LAQ 10/18  Staggered Stance Gluteal Strengthening - 1 x daily - 7 x weekly - 3 sets - 10 reps  Hip Hiking on Step - 1 x daily - 7 x weekly - 3 sets - 10 reps    Access Code: [email or web address]  URL: [email or web address]/  Date: 10/07/2021  Prepared by: Gosia Joseph  Exercises  Lateral Step Up - 1 x daily - 7 x weekly - 3 sets - 10 reps  Clamshell with Resistance - 1 x daily - 7 x weekly - 3 sets - 10 reps  Sidelying Hip Abduction - 1 x daily - 7 x weekly - 3 sets - 10 reps  Prone Hip Extension - 1 x daily - 7 x weekly - 3 sets - 10 reps  Prone Hip Extension with Bent Knee - 1 x daily - 7 x weekly - 3 sets - 10 reps  Side Stepping with Resistance at Thighs - 1 x daily - 7 x weekly - 3 sets - 10 reps  Lateral Heel Tap - 1 x daily - 4 x weekly - 3 sets - 10 reps  Walking March - 1 x daily - 4 x weekly - 3 sets - 10 reps  Hip Abduction with Resistance Loop - 1 x daily - 4 x weekly - 2 sets - 10 reps  Hip Extension with Resistance Loop - 1 x daily - 4 x weekly - 2 sets - 10 reps  Prone Terminal Knee Extension - 1 x daily - 4 x weekly - 1 sets - 15 reps - 5 hold  Supine Active Straight Leg Raise - 1 x daily - 4 x weekly - 2 sets - 10 reps  Quadruped Fire Hydrant - 1 x daily - 4 x weekly - 2 sets - 10 reps  Beginner Front Arm Support - 1 x daily - 4 x weekly - 2 sets - 10 reps  Quadruped Hip Extension Kicks - 1 x daily - 4 x weekly - 2 sets - 10 reps    Access Code: [email or web address]  URL: [email or web address]  Date: 10/02/2021  Prepared by: Gosia Joseph  Exercises  Hooklying Isometric Clamshell - 1 x daily - 7 x weekly - 3 sets - 10 reps  Lateral Step Up - 1 x daily - 7 x weekly - 3 sets - 10 reps  Sit to Stand - 1 x daily - 7 x weekly - 3 sets - 10 reps  Clamshell with Resistance - 1 x daily - 7 x weekly - 3 sets - 10 reps  Sidelying Hip Abduction - 1 x daily - 7 x weekly - 3 sets - 10 reps  Prone Hip Extension - 1 x daily - 7 x weekly - 3 sets - 10 reps  Prone Hip Extension with Bent Knee - 1 x daily - 7 x weekly - 3 sets - 10 reps  Modified Matteo Stretch - 1 x daily - 7 x weekly - 3 sets - 10 reps  Side Stepping with Resistance at Thighs - 1 x daily - 7 x weekly - 3 sets - 10 reps  Lateral Heel Tap - 1 x daily - 4 x weekly - 3 sets - 10 reps  Walking March - 1 x daily - 4 x weekly - 3 sets - 10 reps  Hip Abduction with Resistance Loop - 1 x daily - 4 x weekly - 2 sets - 10 reps  Hip Extension with Resistance Loop - 1 x daily - 4 x weekly - 2 sets - 10 reps      Access Code: [email or web address]  URL: [email or web address]/  Date: 09/22/2021  Prepared by: [facility]  Exercises  Hooklying Isometric Hip Abduction with Belt - 3 x daily - 7 x weekly - 10 reps - 5 hold  Supine Hip Adduction Isometric with Ball - 3 x daily - 7 x weekly - 10 reps - 5 hold  Prone Hip Extension on Table - 1 x daily - 7 x weekly - 3 sets - 10 reps  Hooklying Clamshell with Resistance - 1 x daily - 7 x weekly - 3 sets - 10 reps  Hooklying Isometric Clamshell - 1 x daily - 7 x weekly - 3 sets - 10 reps  Standing Terminal Knee Extension with Resistance - 1 x daily - 7 x weekly - 3 sets - 10 reps  Lateral Step Up - 1 x daily - 7 x weekly - 3 sets - 10 reps  Sit to Stand - 1 x daily - 7 x weekly - 3 sets - 10 reps  Standing Heel Raise - 1 x daily - 7 x weekly - 3 sets - 10 reps    Access Code: [email or web address]   URL: [email or web address]  Date: 09/20/2021  Prepared by: [facility]  Exercises  Hooklying Isometric Hip Abduction with Belt - 3 x daily - 7 x weekly - 10 reps - 5 hold  Supine Hip Adduction Isometric with Ball - 3 x daily - 7 x weekly - 10 reps - 5 hold  Reviewed HEP from hospital including ankle pumps, quad sets, glute sets, heel slides, hip abd slide and LAQ    Therapeutic Exercise and NMR EXR  [x] (83155) Provided verbal/tactile cueing for activities related to strengthening, flexibility, endurance, ROM for improvements in LE, proximal hip, and core control with self care, mobility, lifting, ambulation.   [x] (30193) Provided verbal/tactile cueing for activities related to improving balance, coordination, kinesthetic sense, posture, motor skill, proprioception  to assist with LE, proximal hip, and core control in self care, mobility, lifting, ambulation and eccentric single leg control.   [] (45509) Therapist is in constant attendance of 2 or more patients providing skilled therapy interventions, but not providing any significant amount of measurable one-on-one time to either patient, for improvements in LE, proximal hip, and core control in self care, mobility, lifting, ambulation and eccentric single leg control. NMR and Therapeutic Activities:    [x] (17138 or 20827) Provided verbal/tactile cueing for activities related to improving balance, coordination, kinesthetic sense, posture, motor skill, proprioception and motor activation to allow for proper function of core, proximal hip and LE with self care and ADLs  [] (77047) Gait Re-education- Provided training and instruction to the patient for proper LE, core and proximal hip recruitment and positioning and eccentric body weight control with ambulation re-education including up and down stairs     Home Exercise Program:    [] (20001) Reviewed/Progressed HEP activities related to strengthening, flexibility, endurance, ROM of core, proximal hip and LE for functional self-care, mobility, lifting and ambulation/stair navigation   [] (07245)Reviewed/Progressed HEP activities related to improving balance, coordination, kinesthetic sense, posture, motor skill, proprioception of core, proximal hip and LE for self care, mobility, lifting, and ambulation/stair navigation      Manual Treatments:  PROM / STM / Oscillations-Mobs:  G-I, II, III, IV (PA's, Inf., Post.)  [] (60272) Provided manual therapy to mobilize LE, proximal hip and/or LS spine soft tissue/joints for the purpose of modulating pain, promoting relaxation,  increasing ROM, reducing/eliminating soft tissue swelling/inflammation/restriction, improving soft tissue extensibility and allowing for proper ROM for normal function with self care, mobility, lifting and ambulation.      Modalities:  [] (63131) Vasopneumatic compression: Utilized vasopneumatic compression to decrease edema / swelling for the purpose of improving mobility and quad tone / recruitment which will allow for increased overall function including but not limited to self-care, transfers, ambulation, and ascending / descending stairs. Charges:  Timed Code Treatment Minutes: 42   Total Treatment Minutes: 42     [] EVAL - LOW (73058)   [] EVAL - MOD (37102)  [] EVAL - HIGH (51883)  [] RE-EVAL (12288)  [x] TW(59413) x 1     [] Ionto  [x] NMR (88117) x 1   [] Vaso  [] Manual (04662) x      [] Ultrasound  [x] TA x 1     [] Mech Traction (98654)  [] Aquatic Therapy x     [] ES (un) (19001):   [] Home Management Training x [] ES(attended) (77825)   [] Group:     [] Other:     GOALS:  Patient stated goal: get walking without pain and back to work and recreational activity ASAP  [x] Progressing: [] Met: [] Not Met: [] Adjusted    Therapist goals for Patient:   Short Term Goals: To be achieved in: 2 weeks  1. Independent in HEP and progression per patient tolerance, in order to prevent re-injury. [] Progressing: [x] Met: [] Not Met: [] Adjusted  2. Patient will have a decrease in pain to facilitate improvement in movement, function, and ADLs as indicated by Functional Deficits. [] Progressing: [x] Met: [] Not Met: [] Adjusted    Long Term Goals: To be achieved in: 6 weeks  1. Disability index score of 30% or less for the LEFS to assist with reaching prior level of function. [x] Progressing: [] Met: [] Not Met: [] Adjusted  2. Patient will demonstrate increased AROM to hip flexion to 110 degrees to allow for proper joint functioning as indicated by patients Functional Deficits. [] Progressing: [x] Met: [] Not Met: [] Adjusted  3. Patient will demonstrate an increase in Strength to at least 4+ as well as good proximal hip strength and control to allow for proper functional mobility as indicated by patients Functional Deficits. [x] Progressing: [] Met: [] Not Met: [] Adjusted  4.  Patient will return to functional activities including walking community distances without AD without increased symptoms or restriction, or limp (PATIENT TOLERATING MORE TIME ON FEET BUT STILL HAS NOTICEABLE GAIT DEVIATION DUE TO FUNCTIONAL LEG LENGTH DIFFERENCE)  [x] Progressing: [] Met: [] Not Met: [] Adjusted  5. Patient will be able to return to full unrestricted work activity without issues or restrictions. [x] Progressing: [] Met: [] Not Met: [] Adjusted     Overall Progression Towards Functional goals/ Treatment Progress Update:  [x] Patient is progressing as expected towards functional goals listed. [] Progression is slowed due to complexities/Impairments listed. [] Progression has been slowed due to co-morbidities. [] Plan just implemented, too soon to assess goals progression <30days   [] Goals require adjustment due to lack of progress  [] Patient is not progressing as expected and requires additional follow up with physician  [] Other    Persisting Functional Limitations/Impairments:  []Sitting [x]Standing   [x]Walking [x]Stairs   []Transfers [x]ADLs   [x]Squatting/bending [x]Kneeling  [x]Housework [x]Job related tasks  []Driving [x]Sports/Recreation   [x]Sleeping []Other:    ASSESSMENT:  The patient performed well with exercise and routine this date, marked by progressions of leg strengthening, dynamic warmup and developing performance of hip stability exercises in quadruped, standing, single leg positions. The patient was challenged by pace and selection of exercise this date, but performed without c/o significant pain, some mention of popping. She fatigues with repeated compound LE movements and has trouble with balance/proprioception on R, indicating ongoing need for resisted strengthening, endurance training and hip stability activities in therapy.     Treatment/Activity Tolerance:  [x] Pt able to complete treatment [] Patient limited by fatique  [] Patient limited by pain  [] Patient limited by other medical complications  [] Other:     Prognosis: [x] Good [] Fair  [] Poor    Patient Requires Follow-up: [x] Yes  [] No    PLAN: PT 1-2x / week for 6 weeks. [x] Continue per plan of care [] Alter current plan (see comments)  [] Plan of care initiated [] Hold pending MD visit [] Discharge    Electronically signed by: Yoselyn Bueno, PT, DPT, OMT-C    Note: If patient does not return for scheduled/ recommended follow up visits, this note will serve as a discharge from care along with most recent update on progress.

## 2021-11-26 ENCOUNTER — HOSPITAL ENCOUNTER (OUTPATIENT)
Dept: PHYSICAL THERAPY | Age: 56
Setting detail: THERAPIES SERIES
Discharge: HOME | End: 2021-11-26
Payer: COMMERCIAL

## 2021-11-26 PROCEDURE — 97110 THERAPEUTIC EXERCISES: CPT

## 2021-11-26 PROCEDURE — 97112 NEUROMUSCULAR REEDUCATION: CPT

## 2021-11-26 NOTE — FLOWSHEET NOTE
[facility]  Phone: [phone]   Fax: [phone]    Physical Therapy Treatment Note/ Progress Report:     Date:  2021    Patient Name:  Shantal Julien    :  1965  MRN: [id number]  Restrictions/Precautions:    Medical/Treatment Diagnosis Information:   Diagnosis: M16.11 (ICD-10-CM) - Primary osteoarthritis of right hip S/P 21   Treatment Diagnosis: Decreased R hip ROM, strength, muscle activation, flexibility and pain s/p R PAULETTE with limitations interfering with correct gait mechanics, ADL's, IADL's, recreational and work activity. Insurance/Certification information:  PT Insurance Information: [employer] (Tom Gross needed)  Physician Information:  Referring Practitioner: Cedric Hernández MD  Plan of care signed (Y/N): [x]  Yes  []  No     Date of Patient follow up with Physician:      Progress Report: []  Yes    [x]  No     Date Range for reporting period:   Beginnin/8  PN: 96/, 38/10  Recert:    Ending: -    Progress report due (10 Rx/or 30 days whichever is less): visit #21    Recertification due (POC duration/ or 90 days whichever is less):     Visit # Insurance Allowable Auth required? Date Range    +    60 pcy [x]  Yes - COHERE  []  No   21 - 22       Visits approved Visits  used Date Range      10     8       21 - 22       Latex Allergy:  [x]NO      []YES  Preferred Language for Healthcare:   [x]English       []other:    Functional Scale:        Date assessed:  LEFS: raw score = 0; dysfunction = 100%     LEFS: raw score = 42; dysfunction = 47.5%  10/11  LEFS: raw score=47; dysfunction=41%   10/30    Pain level:  2-4/10      SUBJECTIVE: The patient reports some stiffness and soreness today, but working yesterday allowed some rest. She reports improving strength. She does comment that she has some groin discomfort with deadlifts, but it doesn't prevent performance.      OBJECTIVE:      - AROM R hip rotation: 40deg IR, 30deg ER  11/13: L innominate upslip, R SI posterior innominate L SI anterior innominate   11/9: 22deg ER, 42deg IR, 42deg IR, 52deg ER L  11/4: Continued Mild L innominate upslip, R Pubic downslip, R mld outflare; tight R hip flexors  10/30: Strength:  R hip flexion 4/5, R ABD 4/5, R hip ext 4/5. Mal-alignment present in pelvis: sig pubic downslip, L outflare & mild upslip innom. Gait: independent without AD, pt tends to protract pelvis with some IR of hip in swing and stance phase and has mild glut med lag in activation which with the functional leg length difference makes her lurch in stance phase. RESTRICTIONS/PRECAUTIONS: DOS 9/7    Exercises/Interventions:   Therapeutic Exercise (11116)  Resistance / level Sets/time Reps Notes / Cues   BIKE  10/30   SL leg press 10/30   Standing Quad  10/7   Standing HF Stretch (w/ step)  1/2 Kneeling HF Stretch 10/7   EOT HSS  10/7   SLR Flexion  10/7  10/30 done after MET   Trunk-supported SLR Ext Trunk-supported Glute Kick     Quantum Knee Ext (B3, JHONATAN, A4) 30# 3 10 11/26 - added   Quantum HS Curls (B3, K2, A4) 40# 3 10 11/26 - added   Leg press B Squats 130#   3   15       11/26 - ^ resistance & sets   IB Gastroc   30'' 11/22 - added   Bridge w/ hip ADD/Ball squeeze Sidelying Posterior gluteus Medius SLR AB  10/30 done after MET   Clamshells Reverse Clamshells Hooklying Piriformis (IR/ER) - gentle!   SLR Abd @ wall  3 10, R/L 11/26 - added setL hip flexor stretch with R knee flex to TKE \"pigeons\"         Therapeutic Activities (46606)       Dynamic Warm Up:  Heel Walk  Toe Walk  Butt Kicks  Open Milton  Close Milton  Figure 4  Lateral Squats  Dynamic Hamstring  Deadlift Walk  Cross & Touch  Lateral Band Walk - straight  Lateral Band Walk - squatted   Lateral side stepping TRX 3-point excursion TM Walk  Blippex (sustained) Landmine Bar Squats TRX Retro Lunge  TRX Curtsey Lunge (facing laterally)  Sumo Squat w/ Posterolateral  Tap Orange (ankles) 15'' 3 11/26 - added   Total Gym Triped Glute Kick  2 10 R/L 11/26 - added          Neuromuscular Re-ed (17304)        Rockerboard:  Side to Side Balance     60''   11/26 - added   Glider lunges  - retro  - lateral    1  1     10 R/L  10 R/L       11/26 - bilateral this date   Bridge w/ LLE LAQ with ball squeeze KB Deadlift 10# KB 2 15 R/L 11/26 - cues for neutral spine w/ PVC, added set8\" step-ups with R LE with L LE tap on 6\" step (on side) BOSU (blue up) SAQ Standing Static Balance Gait mechanics Runner's pose w/ single UE support 1 15 LAQwith Ball Squeeze   Standing with green  Band pull into Add chair squats Standing with lime green band pull into add with L LE taps Hands/Knees progression - \"Birddogs\" Prone alternating knee flexion with neutral spine Captain Shabbir's  L Shoulder/arm at wall with R SLS  Hands/knees multifidus pillow under knee  Sideplanks Butt Burners Hookline pelvic wiggle Prone Modified Plank + Glute Kicks         Manual Intervention (96661)       Hip PROM   assessment   Lumbar/SI MET    STM/MFR        L LA disrraction      Modalities:     Pt. Education:  -pt educated on diagnosis, prognosis and expectations for rehab  -all pt questions were answered    Home Exercise Program:  Access Code: [email or web address]  URL: [employer]/  Date: 10/11/2021  Prepared by: Martha with L LAQ 10/18  Staggered Stance Gluteal Strengthening - 1 x daily - 7 x weekly - 3 sets - 10 reps  Hip Hiking on Step - 1 x daily - 7 x weekly - 3 sets - 10 reps    Access Code: [email or web address]  URL: [employer]/  Date: 10/07/2021  Prepared by: Дмитрий Adair  Exercises  Lateral Step Up - 1 x daily - 7 x weekly - 3 sets - 10 reps  Clamshell with Resistance - 1 x daily - 7 x weekly - 3 sets - 10 reps  Sidelying Hip Abduction - 1 x daily - 7 x weekly - 3 sets - 10 reps  Prone Hip Extension - 1 x daily - 7 x weekly - 3 sets - 10 reps  Prone Hip Extension with Bent Knee - 1 x daily - 7 x weekly - 3 sets - 10 reps  Side Stepping with Resistance at Thighs - 1 x daily - 7 x weekly - 3 sets - 10 reps  Lateral Heel Tap - 1 x daily - 4 x weekly - 3 sets - 10 reps  Walking March - 1 x daily - 4 x weekly - 3 sets - 10 reps  Hip Abduction with Resistance Loop - 1 x daily - 4 x weekly - 2 sets - 10 reps  Hip Extension with Resistance Loop - 1 x daily - 4 x weekly - 2 sets - 10 reps  Prone Terminal Knee Extension - 1 x daily - 4 x weekly - 1 sets - 15 reps - 5 hold  Supine Active Straight Leg Raise - 1 x daily - 4 x weekly - 2 sets - 10 reps  Quadruped Fire Hydrant - 1 x daily - 4 x weekly - 2 sets - 10 reps  Beginner Front Arm Support - 1 x daily - 4 x weekly - 2 sets - 10 reps  Quadruped Hip Extension Kicks - 1 x daily - 4 x weekly - 2 sets - 10 reps    Access Code: [email or web address]  URL: [employer]/  Date: 10/02/2021  Prepared by: Jeanetta Pallas  Exercises  Hooklying Isometric Clamshell - 1 x daily - 7 x weekly - 3 sets - 10 reps  Lateral Step Up - 1 x daily - 7 x weekly - 3 sets - 10 reps  Sit to Stand - 1 x daily - 7 x weekly - 3 sets - 10 reps  Clamshell with Resistance - 1 x daily - 7 x weekly - 3 sets - 10 reps  Sidelying Hip Abduction - 1 x daily - 7 x weekly - 3 sets - 10 reps  Prone Hip Extension - 1 x daily - 7 x weekly - 3 sets - 10 reps  Prone Hip Extension with Bent Knee - 1 x daily - 7 x weekly - 3 sets - 10 reps  Modified Matteo Stretch - 1 x daily - 7 x weekly - 3 sets - 10 reps  Side Stepping with Resistance at Thighs - 1 x daily - 7 x weekly - 3 sets - 10 reps  Lateral Heel Tap - 1 x daily - 4 x weekly - 3 sets - 10 reps  Walking March - 1 x daily - 4 x weekly - 3 sets - 10 reps  Hip Abduction with Resistance Loop - 1 x daily - 4 x weekly - 2 sets - 10 reps  Hip Extension with Resistance Loop - 1 x daily - 4 x weekly - 2 sets - 10 reps      Access Code: [email or web address]  URL: [email or web address]  Date: 09/22/2021  Prepared by:  [facility]  Hooklying Isometric Hip Abduction with Belt - 3 x daily - 7 x weekly - 10 reps - 5 hold  Supine Hip Adduction Isometric with Ball - 3 x daily - 7 x weekly - 10 reps - 5 hold  Prone Hip Extension on Table - 1 x daily - 7 x weekly - 3 sets - 10 reps  Hooklying Clamshell with Resistance - 1 x daily - 7 x weekly - 3 sets - 10 reps  Hooklying Isometric Clamshell - 1 x daily - 7 x weekly - 3 sets - 10 reps  Standing Terminal Knee Extension with Resistance - 1 x daily - 7 x weekly - 3 sets - 10 reps  Lateral Step Up - 1 x daily - 7 x weekly - 3 sets - 10 reps  Sit to Stand - 1 x daily - 7 x weekly - 3 sets - 10 reps  Standing Heel Raise - 1 x daily - 7 x weekly - 3 sets - 10 reps    Access Code: [email or web address]   URL: [email or web address]  Date: 09/20/2021  Prepared by: [facility]  Exercises  Hooklying Isometric Hip Abduction with Belt - 3 x daily - 7 x weekly - 10 reps - 5 hold  Supine Hip Adduction Isometric with Ball - 3 x daily - 7 x weekly - 10 reps - 5 hold  Reviewed HEP from hospital including ankle pumps, quad sets, glute sets, heel slides, hip abd slide and LAQ    Therapeutic Exercise and NMR EXR  [x] (12026) Provided verbal/tactile cueing for activities related to strengthening, flexibility, endurance, ROM for improvements in LE, proximal hip, and core control with self care, mobility, lifting, ambulation. [x] (36502) Provided verbal/tactile cueing for activities related to improving balance, coordination, kinesthetic sense, posture, motor skill, proprioception  to assist with LE, proximal hip, and core control in self care, mobility, lifting, ambulation and eccentric single leg control.   [] (29701) Therapist is in constant attendance of 2 or more patients providing skilled therapy interventions, but not providing any significant amount of measurable one-on-one time to either patient, for improvements in LE, proximal hip, and core control in self care, mobility, lifting, ambulation and eccentric single leg control.      NMR and Therapeutic Activities:    [x] (67824 or 00041) Provided verbal/tactile cueing for activities related to improving balance, coordination, kinesthetic sense, posture, motor skill, proprioception and motor activation to allow for proper function of core, proximal hip and LE with self care and ADLs  [] (54544) Gait Re-education- Provided training and instruction to the patient for proper LE, core and proximal hip recruitment and positioning and eccentric body weight control with ambulation re-education including up and down stairs     Home Exercise Program:    [] (91939) Reviewed/Progressed HEP activities related to strengthening, flexibility, endurance, ROM of core, proximal hip and LE for functional self-care, mobility, lifting and ambulation/stair navigation   [] (30809)Reviewed/Progressed HEP activities related to improving balance, coordination, kinesthetic sense, posture, motor skill, proprioception of core, proximal hip and LE for self care, mobility, lifting, and ambulation/stair navigation      Manual Treatments:  PROM / STM / Oscillations-Mobs:  G-I, II, III, IV (PA's, Inf., Post.)  [] (54779) Provided manual therapy to mobilize LE, proximal hip and/or LS spine soft tissue/joints for the purpose of modulating pain, promoting relaxation,  increasing ROM, reducing/eliminating soft tissue swelling/inflammation/restriction, improving soft tissue extensibility and allowing for proper ROM for normal function with self care, mobility, lifting and ambulation. Modalities:  [] (30387) Vasopneumatic compression: Utilized vasopneumatic compression to decrease edema / swelling for the purpose of improving mobility and quad tone / recruitment which will allow for increased overall function including but not limited to self-care, transfers, ambulation, and ascending / descending stairs.        Charges:  Timed Code Treatment Minutes: 44   Total Treatment Minutes: 44     [] EVAL - LOW (90401)   [] EVAL - MOD (20347)  [] EVAL - HIGH (73340)  [] RE-EVAL (07730)  [x] QH(81908) x 2   [] Ionto  [x] NMR (66953) x 1   [] Vaso  [] Manual (48581) x      [] Ultrasound  [] TA x     [] Mech Traction (64964)  [] Aquatic Therapy x     [] ES (un) (76238):   [] Home Management Training x [] ES(attended) (35333)   [] Group:     [] Other:     GOALS:  Patient stated goal: get walking without pain and back to work and recreational activity ASAP  [x] Progressing: [] Met: [] Not Met: [] Adjusted    Therapist goals for Patient:   Short Term Goals: To be achieved in: 2 weeks  1. Independent in HEP and progression per patient tolerance, in order to prevent re-injury. [] Progressing: [x] Met: [] Not Met: [] Adjusted  2. Patient will have a decrease in pain to facilitate improvement in movement, function, and ADLs as indicated by Functional Deficits. [] Progressing: [x] Met: [] Not Met: [] Adjusted    Long Term Goals: To be achieved in: 6 weeks  1. Disability index score of 30% or less for the LEFS to assist with reaching prior level of function. [x] Progressing: [] Met: [] Not Met: [] Adjusted  2. Patient will demonstrate increased AROM to hip flexion to 110 degrees to allow for proper joint functioning as indicated by patients Functional Deficits. [] Progressing: [x] Met: [] Not Met: [] Adjusted  3. Patient will demonstrate an increase in Strength to at least 4+ as well as good proximal hip strength and control to allow for proper functional mobility as indicated by patients Functional Deficits. [x] Progressing: [] Met: [] Not Met: [] Adjusted  4. Patient will return to functional activities including walking community distances without AD without increased symptoms or restriction, or limp (PATIENT TOLERATING MORE TIME ON FEET BUT STILL HAS NOTICEABLE GAIT DEVIATION DUE TO FUNCTIONAL LEG LENGTH DIFFERENCE)  [x] Progressing: [] Met: [] Not Met: [] Adjusted  5. Patient will be able to return to full unrestricted work activity without issues or restrictions. [x] Progressing: [] Met: [] Not Met: [] Adjusted     Overall Progression Towards Functional goals/ Treatment Progress Update:  [x] Patient is progressing as expected towards functional goals listed. [] Progression is slowed due to complexities/Impairments listed. [] Progression has been slowed due to co-morbidities. [] Plan just implemented, too soon to assess goals progression <30days   [] Goals require adjustment due to lack of progress  [] Patient is not progressing as expected and requires additional follow up with physician  [] Other    Persisting Functional Limitations/Impairments:  []Sitting [x]Standing   [x]Walking [x]Stairs   []Transfers [x]ADLs   [x]Squatting/bending [x]Kneeling  [x]Housework [x]Job related tasks  []Driving [x]Sports/Recreation   [x]Sleeping []Other:    ASSESSMENT:  The patient performed well with exercise and routine this date, marked by progressions of leg strengthening and developing performance of hip stability exercises in triped, standing. The patient was again challenged by pace and selection of exercise this date, but performed without c/o significant pain. She had some discomfort with deadlifts, but was able to maintain fair form and performance was maintained. Ongoing PT services to promote endurance, hip stability, strength recommended at this time. Treatment/Activity Tolerance:  [x] Pt able to complete treatment [] Patient limited by fatique  [] Patient limited by pain  [] Patient limited by other medical complications  [] Other:     Prognosis: [x] Good [] Fair  [] Poor    Patient Requires Follow-up: [x] Yes  [] No    PLAN: PT 1-2x / week for 6 weeks.   [x] Continue per plan of care [] Alter current plan (see comments)  [] Plan of care initiated [] Hold pending MD visit [] Discharge    Electronically signed by: Joana Palmer, PT, DPT, OMT-C    Note: If patient does not return for scheduled/ recommended follow up visits, this note will serve as a discharge from care along with most recent update on progress.

## 2021-11-30 ENCOUNTER — HOSPITAL ENCOUNTER (OUTPATIENT)
Dept: PHYSICAL THERAPY | Age: 56
Setting detail: THERAPIES SERIES
Discharge: HOME | End: 2021-11-30
Payer: COMMERCIAL

## 2021-11-30 PROCEDURE — 97530 THERAPEUTIC ACTIVITIES: CPT

## 2021-11-30 PROCEDURE — 97110 THERAPEUTIC EXERCISES: CPT

## 2021-11-30 NOTE — PLAN OF CARE
[facility]  Phone: [phone]   Fax: [phone]  Physical Therapy Re-Certification Plan of Care    Dear   Jacqueline Oliver MD,    We had the pleasure of treating the following patient for physical therapy services at [facility]. A summary of our findings can be found in the updated assessment below. This includes our plan of care. If you have any questions or concerns regarding these findings, please do not hesitate to contact me at the office phone number checked above. Thank you for the referral.     Physician Signature:________________________________Date:__________________  By signing above (or electronic signature), therapist's plan is approved by physician    Functional Outcome:   LEFS: raw score= 58; dysfunction=28%   11/30/21    Overall Response to Treatment:   [x]Patient is responding well to treatment and improvement is noted with regards to goals and will DC with individualized HEP and [facility] pass to continue progression independently or with . The patient has been able to perform yoga course this past weekend. She is improved considerably in her strength, hip mobility and activity tolerance. She is able to perform work tasks without sxs. She has some complains of limb asymmetry and tightness in hip flexor, but understands stretches to address these limitations independently. She has met PT goals listed below.    []Patient should continue to improve in reasonable time if they continue HEP   []Patient has plateaued and is no longer responding to skilled PT intervention    []Patient is getting worse and would benefit from return to referring MD   []Patient unable to adhere to initial POC   []Other:     R Hip Flexion = 138deg  HS 90/90 R = 82deg  52deg IR = R hip  32deg ER = R hip    LE MMT:   Right:    Hip Flexion:   5  Hip aBduction:   4+    Hip Extension:   4+  Hip IR:    5  Hip ER:   4+  Knee Extension:  5  Knee Flexion:    5    SLS R = 10sec  SLS R Airex = 10sec  SLS R EC = 2sec  Tandem R = 10sec   Tandem R EC = 10sec  Tandem Airex EC = 4sec  Tandem Airex EO = 10sec    Date range of Visits: 9/8/21 - 11/30/21  Total Visits: 23    GOALS:  Patient stated goal: get walking without pain and back to work and recreational activity ASAP  [] Progressing: [x] Met: [] Not Met: [] Adjusted    Therapist goals for Patient:   Short Term Goals: To be achieved in: 2 weeks  1. Independent in HEP and progression per patient tolerance, in order to prevent re-injury. [] Progressing: [x] Met: [] Not Met: [] Adjusted  2. Patient will have a decrease in pain to facilitate improvement in movement, function, and ADLs as indicated by Functional Deficits. [] Progressing: [x] Met: [] Not Met: [] Adjusted    Long Term Goals: To be achieved in: 6 weeks  1. Disability index score of 30% or less for the LEFS to assist with reaching prior level of function. [] Progressing: [x] Met: [] Not Met: [] Adjusted  2. Patient will demonstrate increased AROM to hip flexion to 110 degrees to allow for proper joint functioning as indicated by patients Functional Deficits. [] Progressing: [x] Met: [] Not Met: [] Adjusted  3. Patient will demonstrate an increase in Strength to at least 4+ as well as good proximal hip strength and control to allow for proper functional mobility as indicated by patients Functional Deficits. [] Progressing: [x] Met: [] Not Met: [] Adjusted  4. Patient will return to functional activities including walking community distances without AD without increased symptoms or restriction, or limp (PATIENT TOLERATING MORE TIME ON FEET BUT STILL HAS NOTICEABLE GAIT DEVIATION DUE TO FUNCTIONAL LEG LENGTH DIFFERENCE)  [x] Progressing: [] Met: [x] Not Met: [] Adjusted  5. Patient will be able to return to full unrestricted work activity without issues or restrictions.      [] Progressing: [x] Met: [] Not Met: [] Adjusted    Recommendation:    [x]DC    Physical Therapy Treatment Note/ Progress Report:     Date:  2021    Patient Name:  Gerda Mtz    :  1965  MRN: [id number]  Restrictions/Precautions:    Medical/Treatment Diagnosis Information:   Diagnosis: M16.11 (ICD-10-CM) - Primary osteoarthritis of right hip S/P 21   Treatment Diagnosis: Decreased R hip ROM, strength, muscle activation, flexibility and pain s/p R PAULETTE with limitations interfering with correct gait mechanics, ADL's, IADL's, recreational and work activity. Insurance/Certification information:  PT Insurance Information: [employer] (Annie Jermain alex)  Physician Information:  Referring Practitioner: Clinton Koch MD  Plan of care signed (Y/N): [x]  Yes  []  No     Date of Patient follow up with Physician:      Progress Report: [x]  Yes    []  No     Date Range for reporting period:   Beginnin/8  PN: 95/81, 99/  Recert:    Endin21    Progress report due (10 Rx/or 30 days whichever is less): visit #54    Recertification due (POC duration/ or 90 days whichever is less):     Visit # Insurance Allowable Auth required?  Date Range   Eval +    60 pcy [x]  Yes - COHERE  []  No   21 - 22       Visits approved Visits  used Date Range      10     9       21 - 22       Latex Allergy:  [x]NO      []YES  Preferred Language for Healthcare:   [x]English       []other:    Functional Scale:        Date assessed:  LEFS: raw score = 0; dysfunction = 100%     LEFS: raw score = 42; dysfunction = 47.5%  10/11  LEFS: raw score= 47; dysfunction=41%   10/30  LEFS: raw score= 58; dysfunction=28%       Pain level: 0 /10      SUBJECTIVE: SEE dc ABOVE     OBJECTIVE:      - SEE dc ABOVE   - AROM R hip rotation: 40deg IR, 30deg ER  : L innominate upslip, R SI posterior innominate L SI anterior innominate   : 22deg ER, 42deg IR, 42deg IR, 52deg ER L  : Continued Mild L innominate upslip, R Pubic downslip, R mld outflare; tight R hip flexors  10/30: Strength:  R hip flexion 4/5, R ABD 4/5, R hip ext 4/5. Mal-alignment present in pelvis: sig pubic downslip, L outflare & mild upslip innom. Gait: independent without AD, pt tends to protract pelvis with some IR of hip in swing and stance phase and has mild glut med lag in activation which with the functional leg length difference makes her lurch in stance phase. RESTRICTIONS/PRECAUTIONS: DOS 9/7    Exercises/Interventions:   Therapeutic Exercise (69441)  Resistance / level Sets/time Reps Notes / Cues   BIKE  10/30   SL leg press 10/30   Standing Quad  10/7   Standing HF Stretch (w/ step)  1/2 Kneeling HF Stretch 10/7   EOT HSS  10/7   SLR Flexion  10/7  10/30 done after MET   Trunk-supported SLR Ext Trunk-supported Glute Kick FM Squat      FM split squat 30#      30# 1  1    1 10  10 W/ pulses    10 R/L ea 11/30 - added   Hip Abd    Hip Add 30#    50# 2    3 10    10 11/30 - added   Quantum Knee Ext (B3, JHONATAN, A4) 35# 2 15 11/26 - added   Quantum HS Curls (B3, K2, A4) 40# 2 15 11/26 - added   Leg press B Squats 130#  Unilateral R 75# 2  2 15  10     11/26 - ^ resistance & sets   IB Gastroc   30'' 11/22 - added   Bridge w/ hip ADD/Ball squeeze Sidelying Posterior gluteus Medius SLR AB  10/30 done after MET   Clamshells Reverse Clamshells Hooklying Piriformis (IR/ER) - gentle! SLR Abd @ wall  L hip flexor stretch with R knee flex to TKE \"pigeons\"         Therapeutic Activities (63644)       The patient was provided a final assessment including evaluative tests and measures, as well as discharge documentation to track progress, and was issued a 1-month [facility] Pass to continue HEP.    25' 11/30    Dynamic Warm Up:  Heel Walk  Toe Walk  Butt Kicks  Open [place]  Close [place]  Figure 4  Lateral Squats  Dynamic Hamstring  Deadlift Walk  Cross & Touch  Lateral Band Walk - straight  Lateral Band Walk - squatted   Lateral side stepping TRX 3-point excursion TM Walk  Campus Connectr (sustained) Landmine Bar Squats TRX Retro Lunge  TRX Curtsey Lunge (facing laterally)  Sumo Squat w/ Posterolateral  Tap Total Gym Triped Glute Kick  Neuromuscular Re-ed (38234)  Rockerboard:  Side to Side Balance Glider lunges  - retro  - lateral Bridge w/ LLE LAQ with ball squeeze KB Deadlift 8\" step-ups with R LE with L LE tap on 6\" step (on side) BOSU (blue up) SAQ Standing Static Balance Gait mechanics Runner's pose w/ single UE support Angel-Boggs   Standing with green  Band pull into Add chair squats Standing with lime green band pull into add with L LE taps Hands/Knees progression - \"Birddogs\" Prone alternating knee flexion with neutral spine Captain Shabbir's  L Shoulder/arm at wall with R SLS  Hands/knees multifidus pillow under knee  Sideplanks Butt Burners Hookline pelvic wiggle   2 x 10 R/L 11/30 - added reps; TEProne Modified Plank + Glute Kicks         Manual Intervention (78377)       Hip PROM   assessment   Lumbar/SI MET    STM/MFR        L LA disrraction      Modalities:     Pt. Education:  -pt educated on diagnosis, prognosis and expectations for rehab  -all pt questions were answered    Home Exercise Program:  Access Code: [email or web address]  URL: [email or web address]  Date: 10/11/2021  Prepared by: Martha with L LAQ 10/18  Staggered Stance Gluteal Strengthening - 1 x daily - 7 x weekly - 3 sets - 10 reps  Hip Hiking on Step - 1 x daily - 7 x weekly - 3 sets - 10 reps    Access Code: [email or web address]  URL: [email or web address]  Date: 10/07/2021  Prepared by: Lopez Lockett  Exercises  Lateral Step Up - 1 x daily - 7 x weekly - 3 sets - 10 reps  Clamshell with Resistance - 1 x daily - 7 x weekly - 3 sets - 10 reps  Sidelying Hip Abduction - 1 x daily - 7 x weekly - 3 sets - 10 reps  Prone Hip Extension - 1 x daily - 7 x weekly - 3 sets - 10 reps  Prone Hip Extension with Bent Knee - 1 x daily - 7 x weekly - 3 sets - 10 reps  Side Stepping with Resistance at Thighs - 1 x daily - 7 x weekly - 3 sets - 10 reps  Lateral Heel Tap - 1 x daily - 4 x weekly - 3 sets - 10 reps  Walking March - 1 x daily - 4 x weekly - 3 sets - 10 reps  Hip Abduction with Resistance Loop - 1 x daily - 4 x weekly - 2 sets - 10 reps  Hip Extension with Resistance Loop - 1 x daily - 4 x weekly - 2 sets - 10 reps  Prone Terminal Knee Extension - 1 x daily - 4 x weekly - 1 sets - 15 reps - 5 hold  Supine Active Straight Leg Raise - 1 x daily - 4 x weekly - 2 sets - 10 reps  Quadruped Fire Hydrant - 1 x daily - 4 x weekly - 2 sets - 10 reps  Beginner Front Arm Support - 1 x daily - 4 x weekly - 2 sets - 10 reps  Quadruped Hip Extension Kicks - 1 x daily - 4 x weekly - 2 sets - 10 reps    Access Code: [email or web address]  URL: [email or web address]  Date: 10/02/2021  Prepared by: Lisbeth Underwood  Exercises  Hooklying Isometric Clamshell - 1 x daily - 7 x weekly - 3 sets - 10 reps  Lateral Step Up - 1 x daily - 7 x weekly - 3 sets - 10 reps  Sit to Stand - 1 x daily - 7 x weekly - 3 sets - 10 reps  Clamshell with Resistance - 1 x daily - 7 x weekly - 3 sets - 10 reps  Sidelying Hip Abduction - 1 x daily - 7 x weekly - 3 sets - 10 reps  Prone Hip Extension - 1 x daily - 7 x weekly - 3 sets - 10 reps  Prone Hip Extension with Bent Knee - 1 x daily - 7 x weekly - 3 sets - 10 reps  Modified Matteo Stretch - 1 x daily - 7 x weekly - 3 sets - 10 reps  Side Stepping with Resistance at Thighs - 1 x daily - 7 x weekly - 3 sets - 10 reps  Lateral Heel Tap - 1 x daily - 4 x weekly - 3 sets - 10 reps  Walking March - 1 x daily - 4 x weekly - 3 sets - 10 reps  Hip Abduction with Resistance Loop - 1 x daily - 4 x weekly - 2 sets - 10 reps  Hip Extension with Resistance Loop - 1 x daily - 4 x weekly - 2 sets - 10 reps      Access Code: [email or web address]  URL: [email or web address]/  Date: 09/22/2021  Prepared by:  [facility]  Exercises  Hooklying Isometric Hip Abduction with Belt - 3 x daily - 7 x weekly - 10 reps - 5 hold  Supine Hip Adduction Isometric with Ball - 3 x daily - 7 x weekly - 10 reps - 5 hold  Prone Hip Extension on Table - 1 x daily - 7 x weekly - 3 sets - 10 reps  Hooklying Clamshell with Resistance - 1 x daily - 7 x weekly - 3 sets - 10 reps  Hooklying Isometric Clamshell - 1 x daily - 7 x weekly - 3 sets - 10 reps  Standing Terminal Knee Extension with Resistance - 1 x daily - 7 x weekly - 3 sets - 10 reps  Lateral Step Up - 1 x daily - 7 x weekly - 3 sets - 10 reps  Sit to Stand - 1 x daily - 7 x weekly - 3 sets - 10 reps  Standing Heel Raise - 1 x daily - 7 x weekly - 3 sets - 10 reps    Access Code: [email or web address]   URL: [email or web address]  Date: 09/20/2021  Prepared by: Lien Seed  Exercises  Hooklying Isometric Hip Abduction with Belt - 3 x daily - 7 x weekly - 10 reps - 5 hold  Supine Hip Adduction Isometric with Ball - 3 x daily - 7 x weekly - 10 reps - 5 hold  Reviewed HEP from hospital including ankle pumps, quad sets, glute sets, heel slides, hip abd slide and LAQ    Therapeutic Exercise and NMR EXR  [x] (21440) Provided verbal/tactile cueing for activities related to strengthening, flexibility, endurance, ROM for improvements in LE, proximal hip, and core control with self care, mobility, lifting, ambulation.  [] (04262) Provided verbal/tactile cueing for activities related to improving balance, coordination, kinesthetic sense, posture, motor skill, proprioception  to assist with LE, proximal hip, and core control in self care, mobility, lifting, ambulation and eccentric single leg control.   [] (11163) Therapist is in constant attendance of 2 or more patients providing skilled therapy interventions, but not providing any significant amount of measurable one-on-one time to either patient, for improvements in LE, proximal hip, and core control in self care, mobility, lifting, ambulation and eccentric single leg control.      NMR and Therapeutic Activities:    [x] (75711 or 63587) Provided verbal/tactile cueing for activities related to improving balance, coordination, kinesthetic sense, posture, motor skill, proprioception and motor activation to allow for proper function of core, proximal hip and LE with self care and ADLs  [] (93274) Gait Re-education- Provided training and instruction to the patient for proper LE, core and proximal hip recruitment and positioning and eccentric body weight control with ambulation re-education including up and down stairs     Home Exercise Program:    [] (85477) Reviewed/Progressed HEP activities related to strengthening, flexibility, endurance, ROM of core, proximal hip and LE for functional self-care, mobility, lifting and ambulation/stair navigation   [] (26765)Reviewed/Progressed HEP activities related to improving balance, coordination, kinesthetic sense, posture, motor skill, proprioception of core, proximal hip and LE for self care, mobility, lifting, and ambulation/stair navigation      Manual Treatments:  PROM / STM / Oscillations-Mobs:  G-I, II, III, IV (PA's, Inf., Post.)  [] (94501) Provided manual therapy to mobilize LE, proximal hip and/or LS spine soft tissue/joints for the purpose of modulating pain, promoting relaxation,  increasing ROM, reducing/eliminating soft tissue swelling/inflammation/restriction, improving soft tissue extensibility and allowing for proper ROM for normal function with self care, mobility, lifting and ambulation. Modalities:  [] (57423) Vasopneumatic compression: Utilized vasopneumatic compression to decrease edema / swelling for the purpose of improving mobility and quad tone / recruitment which will allow for increased overall function including but not limited to self-care, transfers, ambulation, and ascending / descending stairs.        Charges:  Timed Code Treatment Minutes: 55   Total Treatment Minutes: 55     [] EVAL - LOW (94522)   [] EVAL - MOD (48372)  [] EVAL - HIGH (66211)  [] Easter Apo (19388)  [x] RE(51183) x 2   [] Ionto  [] NMR (81415) x    [] Vaso  [] Manual (95397) x      [] Ultrasound  [x] TA x 2     [] Mech Traction (53641)  [] Aquatic Therapy x     [] ES (un) (67601):   [] Home Management Training x [] ES(attended) (52072)   [] Group:     [] Other:     Overall Progression Towards Functional goals/ Treatment Progress Update:  [x] Patient is progressing as expected towards functional goals listed. [] Progression is slowed due to complexities/Impairments listed. [] Progression has been slowed due to co-morbidities. [] Plan just implemented, too soon to assess goals progression <30days   [] Goals require adjustment due to lack of progress  [] Patient is not progressing as expected and requires additional follow up with physician  [] Other    Persisting Functional Limitations/Impairments:  []Sitting []Standing   [x]Walking []Stairs   []Transfers []ADLs   []Squatting/bending [x]Kneeling  []Housework []Job related tasks  []Driving [x]Sports/Recreation   [x]Sleeping []Other:    ASSESSMENT:  SEE DC ABOVE    Treatment/Activity Tolerance:  [x] Pt able to complete treatment [] Patient limited by fatique  [] Patient limited by pain  [] Patient limited by other medical complications  [] Other:     Prognosis: [x] Good [] Fair  [] Poor    Patient Requires Follow-up: [x] Yes  [] No    PLAN:   [] Continue per plan of care [] Alter current plan (see comments)  [] Plan of care initiated [] Hold pending MD visit [x] Discharge    Electronically signed by: Lidia Medina, PT, DPT, OMT-C    Note: If patient does not return for scheduled/ recommended follow up visits, this note will serve as a discharge from care along with most recent update on progress.

## 2022-03-08 ENCOUNTER — HOSPITAL ENCOUNTER (OUTPATIENT)
Dept: MAMMOGRAPHY | Age: 57
Discharge: HOME | End: 2022-03-08
Payer: COMMERCIAL

## 2022-03-08 VITALS — WEIGHT: 160 LBS | BODY MASS INDEX: 25.71 KG/M2 | HEIGHT: 66 IN

## 2022-03-08 DIAGNOSIS — Z12.31 ENCOUNTER FOR SCREENING MAMMOGRAM FOR BREAST CANCER: ICD-10-CM

## 2022-03-08 PROCEDURE — 77063 BREAST TOMOSYNTHESIS BI: CPT

## 2022-10-17 ENCOUNTER — OFFICE VISIT (OUTPATIENT)
Dept: ORTHOPEDIC SURGERY | Age: 57
End: 2022-10-17
Payer: COMMERCIAL

## 2022-10-17 VITALS — HEIGHT: 66 IN | BODY MASS INDEX: 24.91 KG/M2 | WEIGHT: 155 LBS

## 2022-10-17 DIAGNOSIS — M25.551 RIGHT HIP PAIN: Primary | ICD-10-CM

## 2022-10-17 PROCEDURE — 99214 OFFICE O/P EST MOD 30 MIN: CPT | Performed by: ORTHOPAEDIC SURGERY

## 2022-10-17 RX ORDER — CELECOXIB 200 MG/1
200 CAPSULE ORAL DAILY
Qty: 30 CAPSULE | Refills: 1 | Status: SHIPPED | OUTPATIENT
Start: 2022-10-17

## 2022-10-18 NOTE — PROGRESS NOTES
Patient: Janeth Patiño  : 1965    MRN: [id number]    Date of Visit: 10/18/22    Attending Physician: Amol Carmona MD    History of Present Illness  Ms. Irma Andrew is a very pleasant 64 y.o. patient with a history of right total hip arthroplasty. This was performed about 13 months ago. She did well postoperatively but continue to have some soreness in the hip as well as discomfort in the hip with certain motions including rotation as well as forward flexion of the hip. She denies anything like fevers or chills she denies any issues with wound healing after the surgery. Where specifically she denies any weightbearing pain she denies start up pain she denies any mechanical symptoms however she says that the hip is never felt completely normal.  She does feel that her right leg is slightly longer. PMH/PSH:  Past Medical History:   Diagnosis Date    Allergic rhinitis     Cough     GERD (gastroesophageal reflux disease)     Kidney stones      Patient Active Problem List   Diagnosis    Shortness of breath    Adhesive capsulitis    Shoulder pain    Primary osteoarthritis of right hip     Past Surgical History:   Procedure Laterality Date    BREAST BIOPSY      , benign    LITHOTRIPSY      TOTAL HIP ARTHROPLASTY Right 2021    RIGHT TOTAL HIP ARTHROPLASTY ANTERIOR APPROACH - GERRI ADVANCED performed by Yohana Ba MD at [facility]         MEDS:  Scheduled Meds:  Continuous Infusions:  PRN Meds:  Current Meds:No current outpatient medications on file.       ALLERGIES:  No Known Allergies      Social History:   Social History     Socioeconomic History    Marital status: Single     Spouse name: Not on file    Number of children: Not on file    Years of education: Not on file    Highest education level: Not on file   Social Needs    Financial resource strain: Not on file    Food insecurity - worry: Not on file    Food insecurity - inability: Not on file    Transportation needs - medical: Not on file    Transportation needs - non-medical: Not on file   Occupational History    Not on file   Tobacco Use    Smoking status: Never Smoker    Smokeless tobacco: Never Used   Substance and Sexual Activity    Alcohol use: No     Frequency: Never    Drug use: No    Sexual activity: Not on file   Other Topics Concern    Not on file   Social History Narrative    Not on file         Family History:   No family history on file. Review of Systems:  No personal history of DVT, PE. 12 point ROS otherwise negative other than reported in HPI. Physical Examination:  Patient is alert and oriented x 3 and appears well nourished and appropriate for today's visit. Gait: The patient walks with an antalgic gait. R hip: Well-healed anterior incision, there is no tenderness noted throughout the anterior hip musculature lateral hip musculature posterior buttock area there is discomfort with forward flexion and internal rotation noted in the anterior hip and groin area as well as some discomfort with Stinchfield noted in the thigh. There is no SI pain. Internal and external rotation of the hip does not recreate her discomfort she does have discomfort with forward flexion    Radiographs: Multiple views of the right hip including AP pelvis AP lateral hip frog-leg: There is a well-positioned total hip arthroplasty the acetabular component does appear to be slightly larger than her native cup however otherwise is positioned well the femoral component is well sized there are no signs of. Plate loosening or motion compared to prior x-rays. Leg lengths are quite similar the right is longer by about 3 to 5 mm. Assessment and Plan?: The patient has pain status post total hip arthroplasty I believe this is partially due to impingement of the anterior musculature including the iliopsoas.       We discussed the diagnosis and treatment options in detail with

## 2022-10-24 SDOH — HEALTH STABILITY: PHYSICAL HEALTH: ON AVERAGE, HOW MANY MINUTES DO YOU ENGAGE IN EXERCISE AT THIS LEVEL?: 60 MIN

## 2022-10-24 SDOH — HEALTH STABILITY: PHYSICAL HEALTH: ON AVERAGE, HOW MANY DAYS PER WEEK DO YOU ENGAGE IN MODERATE TO STRENUOUS EXERCISE (LIKE A BRISK WALK)?: 2 DAYS

## 2022-10-24 ASSESSMENT — SOCIAL DETERMINANTS OF HEALTH (SDOH)

## 2022-10-25 ENCOUNTER — OFFICE VISIT (OUTPATIENT)
Dept: ORTHOPEDIC SURGERY | Age: 57
End: 2022-10-25
Payer: COMMERCIAL

## 2022-10-25 VITALS — HEIGHT: 66 IN | WEIGHT: 154.98 LBS | BODY MASS INDEX: 24.91 KG/M2

## 2022-10-25 DIAGNOSIS — M25.851 HIP IMPINGEMENT SYNDROME, RIGHT: ICD-10-CM

## 2022-10-25 DIAGNOSIS — Z96.641 HISTORY OF TOTAL HIP ARTHROPLASTY, RIGHT: ICD-10-CM

## 2022-10-25 DIAGNOSIS — M25.551 RIGHT HIP PAIN: ICD-10-CM

## 2022-10-25 DIAGNOSIS — M70.71 ILIOPSOAS BURSITIS OF RIGHT HIP: ICD-10-CM

## 2022-10-25 PROCEDURE — 99242 OFF/OP CONSLTJ NEW/EST SF 20: CPT | Performed by: INTERNAL MEDICINE

## 2022-10-25 PROCEDURE — 20611 DRAIN/INJ JOINT/BURSA W/US: CPT | Performed by: INTERNAL MEDICINE

## 2022-10-25 RX ORDER — LIDOCAINE HYDROCHLORIDE 20 MG/ML
5 INJECTION, SOLUTION INFILTRATION; PERINEURAL ONCE
Status: COMPLETED | OUTPATIENT
Start: 2022-10-25 | End: 2022-10-25

## 2022-10-25 RX ORDER — BUPIVACAINE HYDROCHLORIDE 2.5 MG/ML
8 INJECTION, SOLUTION INFILTRATION; PERINEURAL ONCE
Status: COMPLETED | OUTPATIENT
Start: 2022-10-25 | End: 2022-10-25

## 2022-10-25 RX ORDER — BETAMETHASONE SODIUM PHOSPHATE AND BETAMETHASONE ACETATE 3; 3 MG/ML; MG/ML
6 INJECTION, SUSPENSION INTRA-ARTICULAR; INTRALESIONAL; INTRAMUSCULAR; SOFT TISSUE ONCE
Status: COMPLETED | OUTPATIENT
Start: 2022-10-25 | End: 2022-10-25

## 2022-10-25 RX ADMIN — BUPIVACAINE HYDROCHLORIDE 20 MG: 2.5 INJECTION, SOLUTION INFILTRATION; PERINEURAL at 09:46

## 2022-10-25 RX ADMIN — LIDOCAINE HYDROCHLORIDE 5 ML: 20 INJECTION, SOLUTION INFILTRATION; PERINEURAL at 09:47

## 2022-10-25 RX ADMIN — BETAMETHASONE SODIUM PHOSPHATE AND BETAMETHASONE ACETATE 6 MG: 3; 3 INJECTION, SUSPENSION INTRA-ARTICULAR; INTRALESIONAL; INTRAMUSCULAR; SOFT TISSUE at 09:46

## 2022-10-25 NOTE — PROGRESS NOTES
Chief Complaint:   Chief Complaint   Patient presents with    Hip Pain          History of Present Illness:       Patient is a 64 y.o. female presents with the above complaint. The symptoms began  sometime after right elective PAULETTE 9/7/2021 and started without an injury. The patient describes a sharp, aching pain that does not radiate. The symptoms are constant  and are are improving since the onset. TOTAL HIP ARTHROPLASTY Right 9/7/2021   RIGHT TOTAL HIP ARTHROPLASTY ANTERIOR APPROACH - GERRI ADVANCED performed by Fabricio Serna MD at [address]       Pain localizesanterior and  does not seems to follow  provacative pattern suggestive of greater trochanteric pain syndrome. The patient denies subjective instability about the hip and denies progressive weakness of the lower extremity. Pain level  mild to moderate    The patient denies a pattern of activity related swelling. There is no prior history of hip trauma. There is no prior history of autoimmune disease, autoimmune disease, or crystal arthropathy.       Past Medical History:        Past Medical History:   Diagnosis Date    Allergic rhinitis     Cough     GERD (gastroesophageal reflux disease)     Kidney stones          Past Surgical History:   Procedure Laterality Date    BREAST BIOPSY      1997, benign    LITHOTRIPSY      TOTAL HIP ARTHROPLASTY Right 9/7/2021    RIGHT TOTAL HIP ARTHROPLASTY ANTERIOR APPROACH - GERRI ADVANCED performed by Fabricio Serna MD at [address] [facility]           Present Medications:         Current Outpatient Medications   Medication Sig Dispense Refill    Estradiol-Progesterone (BIJUVA PO) Take by mouth      MELATONIN ER PO Take by mouth      celecoxib (CELEBREX) 200 MG capsule Take 1 capsule by mouth daily 30 capsule 1    acetaminophen (TYLENOL) 500 MG tablet Take 500 mg by mouth every 6 hours as needed for Pain      aspirin 325 MG EC tablet Take 1 tablet by mouth daily 30 tablet 0    celecoxib (CELEBREX) 200 MG capsule Take 1 capsule by mouth daily 30 capsule 0    Cholecalciferol (VITAMIN D3) 50 MCG (2000 UT) TABS Take 1 tablet by mouth daily 30 tablet 5    diclofenac (VOLTAREN) 50 MG EC tablet Take 50 mg by mouth 2 times daily  (Patient not taking: No sig reported)      pantoprazole (PROTONIX) 40 MG tablet  (Patient not taking: Reported on 10/17/2022)       Current Facility-Administered Medications   Medication Dose Route Frequency Provider Last Rate Last Admin    betamethasone acetate-betamethasone sodium phosphate (CELESTONE) injection 6 mg  6 mg Intra-artICUlar Once Nuno Aleman MD        bupivacaine (MARCAINE) 0.25 % injection 20 mg  8 mL Intra-artICUlar Once Nuno Aleman MD        lidocaine 2 % injection 5 mL  5 mL IntraDERmal Once Nuno Aleman MD             Allergies:         Allergies   Allergen Reactions    Benzoyl Peroxide Hives    Adhesive Tape Rash    Amoxicillin Rash        Social History:         Social History     Socioeconomic History    Marital status:      Spouse name: Not on file    Number of children: Not on file    Years of education: Not on file    Highest education level: Not on file   Occupational History    Occupation: Physician   Tobacco Use    Smoking status: Never    Smokeless tobacco: Never   Vaping Use    Vaping Use: Never used   Substance and Sexual Activity    Alcohol use: Yes     Comment: rare    Drug use: Not on file    Sexual activity: Not on file   Other Topics Concern    Not on file   Social History Narrative    Not on file     Social Determinants of Health     Financial Resource Strain: Not on file   Food Insecurity: Not on file   Transportation Needs: Not on file   Physical Activity: Insufficiently Active    Days of Exercise per Week: 2 days    Minutes of Exercise per Session: 60 min   Stress: Not on file   Social Connections: Not on file   Intimate Partner Violence: Not At Risk    Fear of Current or Ex-Partner: No    Emotionally Abused: No    Physically Abused: No    Sexually Abused: No   Housing Stability: Not on file        Review of Symptoms:    Pertinent items are noted in HPI    10 point review of systems negative except as mentioned in HPI    Vital Signs: There were no vitals filed for this visit. General Exam:     Constitutional: Patient is adequately groomed with no evidence of malnutrition    Lymphatics: no lymphadenopathy of the inguinal region or lower extremity      Physical Exam: right hip      Primary Exam:    Inspection: No deformity atrophy small effusion      Palpation: No focal tendernes      Strength: Pain inhibition weakness with SLR      Special Tests: Pain with SLR localizing to the anterior hip      Skin: There are no rashes, ulcerations or lesions. Gait: Nonantalgic      Neurovascular - non focal and intact       Additional Comments:        Additional Examinations:                Office Imaging Results/Procedures PerformedToday:          Office Procedures:     Orders Placed This Encounter   Procedures    US GUIDED NEEDLE PLACEMENT     Standing Status:   Future     Number of Occurrences:   1     Standing Expiration Date:   10/25/2023     Order Specific Question:   Reason for exam:     Answer:   CSI    [place] ARTHROCENTESIS ASPIR&/INJ MAJOR JT/BURSA W/O US     Ultrasound-guided injection iliopsoas tendon bursa-right  Logiq ultrasound 5 MHz    The patient was positioned supine on examination table. The curvilinear transducer was utilized. Femoral acetabular joint was identified in the acetabular component and femoral components of the hip prosthesis were identified in coronal oblique view. The probe was then positioned in short axis over the femoral head component. The curvilinear transducer was then translated cephalad to just proximal to the acetabular component at the level of the iliopectineal eminence. No evidence of iliopsoas bursal effusion. The iliopsoas tendon muscle unit was identified. Iliopsoas tendon eccentrically positioned medially within the muscle. Using a lateral approach after sterile preparation, 25-gauge needle was advanced subcutaneously and intramuscularly under direct guidance. Approximately 5 cc 1% lidocaine was utilized. A 22-gauge spinal needle was then advanced in the same needle tract and the needle tip advanced just deep to the iliopsoas tendon. A small aliquot of 0.25% Marcaine was injected in the bursa visualized to hydrodissect. Using exchange of syringe technique 1 cc of Celestone 3 cc of 0.25% Marcaine was injected within the iliopsoas bursa. Patient tolerated this with negligible discomfort. Images stored    At least partial anesthetic response      Other Outside Imaging and Testing Personally Reviewed:    No results found. Assessment   Impression: . Encounter Diagnoses   Name Primary? Iliopsoas bursitis of right hip     Hip impingement syndrome, right     History of total hip arthroplasty, right     Right hip pain               Plan:     Postinjection protocol and clinical follow-up with Dr. Cherelle Elizabeth as scheduled      The nature of the finding, probable diagnosis and likely treatment was thoroughly discussed with the patient. The options, risks, complications, alternative treatment as well as some of the differential diagnosis was discussed. The patient was thoroughly informed and all questions were answered. the patient indicated understanding and satisfaction with the discussion. Orders:        Orders Placed This Encounter   Procedures    US GUIDED NEEDLE PLACEMENT     Standing Status:   Future     Number of Occurrences:   1     Standing Expiration Date:   10/25/2023     Order Specific Question:   Reason for exam:     Answer:   CSI    [place] ARTHROCENTESIS ASPIR&/INJ MAJOR JT/BURSA W/O US           Disclaimer: \"This note was dictated with voice recognition software.  Though review and correction are routine, we apologize for any errors. \"

## 2022-10-25 NOTE — LETTER
[address]  [address] [facility] [address]  Phone: [phone]  Fax: [phone]           Brendan Murphy MD      October 27, 2022     Patient: Miles Esparza   MR Number: [id number]   YOB: 1965   Date of Visit: 10/25/2022       Dear Dr. Javier Larose:    Thank you for referring Kendall Morales to me for evaluation/treatment. Below are the relevant portions of my assessment and plan of care. Impression: . Encounter Diagnoses   Name Primary?  Iliopsoas bursitis of right hip     Hip impingement syndrome, right     History of total hip arthroplasty, right     Right hip pain               Plan:     Postinjection protocol and clinical follow-up with Dr. Adal Alejandro as scheduled      The nature of the finding, probable diagnosis and likely treatment was thoroughly discussed with the patient. The options, risks, complications, alternative treatment as well as some of the differential diagnosis was discussed. The patient was thoroughly informed and all questions were answered. the patient indicated understanding and satisfaction with the discussion. Orders:        Orders Placed This Encounter   Procedures    US GUIDED NEEDLE PLACEMENT     Standing Status:   Future     Number of Occurrences:   1     Standing Expiration Date:   10/25/2023     Order Specific Question:   Reason for exam:     Answer:   CSI    [place] ARTHROCENTESIS ASPIR&/INJ MAJOR JT/BURSA W/O US           Disclaimer: \"This note was dictated with voice recognition software. Though review and correction are routine, we apologize for any errors. \"             If you have questions, please do not hesitate to call me. I look forward to following Cas along with you.     Sincerely,        Brendan Murphy MD    CC providers:  Christel Rg MD  [address] [place] [address],[address] [id number]  [facility]

## (undated) DEVICE — BLANKET WRM W29.9XL79.1IN UP BODY FORC AIR MISTRAL-AIR

## (undated) DEVICE — STERILE LATEX POWDER FREE SURGICAL GLOVES WITH HYDROGEL COATING: Brand: PROTEXIS

## (undated) DEVICE — 3M™ TEGADERM™ TRANSPARENT FILM DRESSING FRAME STYLE, 1627, 4 IN X 10 IN (10 CM X 25 CM), 20/CT 4CT/CASE: Brand: 3M™ TEGADERM™

## (undated) DEVICE — CIRCUIT VENTILATOR 2 LIMB AD 72 IN 22 MM CONVENTIONAL N HEAT

## (undated) DEVICE — GLOVE SURG SZ 8.5 L11.85IN FNGR THK9.8MIL STRW LTX POLYMER

## (undated) DEVICE — COVER,MAYO STAND,XL,STERILE: Brand: MEDLINE

## (undated) DEVICE — HANDPIECE SET WITH HIGH FLOW TIP AND SUCTION TUBE: Brand: INTERPULSE

## (undated) DEVICE — SHEET,DRAPE,53X77,STERILE: Brand: MEDLINE

## (undated) DEVICE — RESTRAINT EXT ANK WRST LT BLU FOAM 2 STRP SIDE BCKL HK AND

## (undated) DEVICE — BIPOLAR SEALER 23-112-1 AQM 6.0: Brand: AQUAMANTYS ®

## (undated) DEVICE — SOLUTION IRRIG 2000ML 0.9% SOD CHL USP UROMATIC PLAS CONT

## (undated) DEVICE — PEN: MARKING STD 100/CS: Brand: MEDICAL ACTION INDUSTRIES

## (undated) DEVICE — MAT FLR ABS DISP

## (undated) DEVICE — SUTURE STRATAFIX SZ 1 L14IN ABSRB VLT L36MM MO-4 TAPERPOINT SXPD2B400

## (undated) DEVICE — HOOD, PEEL-AWAY: Brand: FLYTE

## (undated) DEVICE — PACK SURG HIP ASSESSORY

## (undated) DEVICE — BANDAGE COBAN 4 IN COMPR W4INXL5YD FOAM COHESIVE QUIK STK SELF ADH SFT

## (undated) DEVICE — HOOD: Brand: FLYTE

## (undated) DEVICE — CONTAINER,SPECIMEN,OR STERILE,4OZ: Brand: MEDLINE

## (undated) DEVICE — PAD,NON-ADHERENT,3X8,STERILE,LF,1/PK: Brand: MEDLINE

## (undated) DEVICE — MAJOR SET UP PK

## (undated) DEVICE — SUTURE VCRL + SZ 2-0 L18IN ABSRB UD CT1 L36MM 1/2 CIR VCP839D

## (undated) DEVICE — SYRINGE MED 10ML TRNSLUC BRL PLUNG BLK MRK POLYPR CTRL

## (undated) DEVICE — BIT DRL L30MM MOD FLEX DISP FOR ACET CUP SCR

## (undated) DEVICE — APPLICATOR MEDICATED 26 CC SOLUTION HI LT ORNG CHLORAPREP

## (undated) DEVICE — PADDING UNDERCAST W4INXL4YD 100% COT CRIMPED FINISH WBRL II

## (undated) DEVICE — GOWN,AURORA,NONREINF,RAGLAN,XXL,STERILE: Brand: MEDLINE

## (undated) DEVICE — STANDARD HYPODERMIC NEEDLE,POLYPROPYLENE HUB: Brand: MONOJECT

## (undated) DEVICE — Device: Brand: COVER, PERINEAL POST, 12 PK

## (undated) DEVICE — YANKAUER OPEN TIP, NO VENT: Brand: ARGYLE

## (undated) DEVICE — DECANTER FLD 9IN ST BG FOR ASEP TRNSF OF FLD

## (undated) DEVICE — STRYKER PERFORMANCE SERIES SAGITTAL BLADE: Brand: STRYKER PERFORMANCE SERIES

## (undated) DEVICE — 450 ML BOTTLE OF 0.05% CHLORHEXIDINE GLUCONATE IN 99.95% STERILE WATER FOR IRRIGATION, USP AND APPLICATOR.: Brand: IRRISEPT ANTIMICROBIAL WOUND LAVAGE

## (undated) DEVICE — SUTURE MCRYL + SZ 3-0 L27IN ABSRB UD PS1 L24MM 3/8 CIR REV MCP936H

## (undated) DEVICE — SYRINGE MED 30ML STD CLR PLAS LUERLOCK TIP N CTRL DISP

## (undated) DEVICE — MERCY FAIRFIELD TURNOVER KIT: Brand: MEDLINE INDUSTRIES, INC.

## (undated) DEVICE — SYSTEM SKIN CLSR 22CM DERMBND PRINEO

## (undated) DEVICE — SUTURE VCRL + SZ 0 L18IN ABSRB UD L36MM CT-1 1/2 CIR VCP840D

## (undated) DEVICE — C-ARM: Brand: UNBRANDED

## (undated) DEVICE — POSITIONER,HEAD,RING CUSHION,9IN,32CS: Brand: MEDLINE